# Patient Record
Sex: MALE | Race: WHITE | NOT HISPANIC OR LATINO
[De-identification: names, ages, dates, MRNs, and addresses within clinical notes are randomized per-mention and may not be internally consistent; named-entity substitution may affect disease eponyms.]

---

## 2017-06-07 ENCOUNTER — APPOINTMENT (OUTPATIENT)
Dept: UROLOGY | Facility: CLINIC | Age: 67
End: 2017-06-07
Payer: COMMERCIAL

## 2017-06-07 PROCEDURE — 99214 OFFICE O/P EST MOD 30 MIN: CPT

## 2017-06-08 LAB
APPEARANCE: CLEAR
BACTERIA: NEGATIVE
BILIRUBIN URINE: NEGATIVE
BLOOD URINE: NEGATIVE
COLOR: YELLOW
CORE LAB FLUID CYTOLOGY: NORMAL
GLUCOSE QUALITATIVE U: NORMAL MG/DL
KETONES URINE: NEGATIVE
LEUKOCYTE ESTERASE URINE: NEGATIVE
MICROSCOPIC-UA: NORMAL
NITRITE URINE: NEGATIVE
PH URINE: 6
PROTEIN URINE: NEGATIVE MG/DL
PSA FREE FLD-MCNC: 13.8
PSA FREE SERPL-MCNC: 0.04 NG/ML
PSA SERPL-MCNC: 0.29 NG/ML
RED BLOOD CELLS URINE: 1 /HPF
SPECIFIC GRAVITY URINE: 1.01
SQUAMOUS EPITHELIAL CELLS: 0 /HPF
UROBILINOGEN URINE: 1 MG/DL
WHITE BLOOD CELLS URINE: 1 /HPF

## 2017-09-30 ENCOUNTER — INPATIENT (INPATIENT)
Facility: HOSPITAL | Age: 67
LOS: 12 days | Discharge: NURSING, REHAB OR LONG-TERM CARE | DRG: 235 | End: 2017-10-13
Attending: THORACIC SURGERY (CARDIOTHORACIC VASCULAR SURGERY) | Admitting: THORACIC SURGERY (CARDIOTHORACIC VASCULAR SURGERY)
Payer: MEDICARE

## 2017-09-30 VITALS
DIASTOLIC BLOOD PRESSURE: 71 MMHG | SYSTOLIC BLOOD PRESSURE: 104 MMHG | TEMPERATURE: 98 F | OXYGEN SATURATION: 96 % | HEIGHT: 72 IN | RESPIRATION RATE: 20 BRPM | WEIGHT: 179.9 LBS | HEART RATE: 80 BPM

## 2017-09-30 LAB
ALBUMIN SERPL ELPH-MCNC: 3.6 G/DL — SIGNIFICANT CHANGE UP (ref 3.3–5)
ALP SERPL-CCNC: 158 U/L — HIGH (ref 40–120)
ALT FLD-CCNC: 85 U/L — HIGH (ref 10–45)
ANION GAP SERPL CALC-SCNC: 19 MMOL/L — HIGH (ref 5–17)
APTT BLD: 35.1 SEC — SIGNIFICANT CHANGE UP (ref 27.5–37.4)
AST SERPL-CCNC: 28 U/L — SIGNIFICANT CHANGE UP (ref 10–40)
BASOPHILS NFR BLD AUTO: 0.1 % — SIGNIFICANT CHANGE UP (ref 0–2)
BILIRUB SERPL-MCNC: 0.5 MG/DL — SIGNIFICANT CHANGE UP (ref 0.2–1.2)
BLD GP AB SCN SERPL QL: NEGATIVE — SIGNIFICANT CHANGE UP
BUN SERPL-MCNC: 121 MG/DL — HIGH (ref 7–23)
CALCIUM SERPL-MCNC: 8.6 MG/DL — SIGNIFICANT CHANGE UP (ref 8.4–10.5)
CHLORIDE SERPL-SCNC: 91 MMOL/L — LOW (ref 96–108)
CHOLEST SERPL-MCNC: 119 MG/DL — SIGNIFICANT CHANGE UP (ref 10–199)
CK MB CFR SERPL CALC: 3.3 NG/ML — SIGNIFICANT CHANGE UP (ref 0–6.7)
CK SERPL-CCNC: 88 U/L — SIGNIFICANT CHANGE UP (ref 30–200)
CO2 SERPL-SCNC: 18 MMOL/L — LOW (ref 22–31)
CREAT SERPL-MCNC: 4.28 MG/DL — HIGH (ref 0.5–1.3)
EOSINOPHIL NFR BLD AUTO: 0.5 % — SIGNIFICANT CHANGE UP (ref 0–6)
GLUCOSE SERPL-MCNC: 216 MG/DL — HIGH (ref 70–99)
HBA1C BLD-MCNC: 5.5 % — SIGNIFICANT CHANGE UP (ref 4–5.6)
HCT VFR BLD CALC: 25.2 % — LOW (ref 39–50)
HDLC SERPL-MCNC: 30 MG/DL — LOW (ref 40–125)
HGB BLD-MCNC: 8.7 G/DL — LOW (ref 13–17)
INR BLD: 1.14 — SIGNIFICANT CHANGE UP (ref 0.88–1.16)
LIPID PNL WITH DIRECT LDL SERPL: 64 MG/DL — SIGNIFICANT CHANGE UP
LYMPHOCYTES # BLD AUTO: 9.5 % — LOW (ref 13–44)
MCHC RBC-ENTMCNC: 31.4 PG — SIGNIFICANT CHANGE UP (ref 27–34)
MCHC RBC-ENTMCNC: 34.5 G/DL — SIGNIFICANT CHANGE UP (ref 32–36)
MCV RBC AUTO: 91 FL — SIGNIFICANT CHANGE UP (ref 80–100)
MONOCYTES NFR BLD AUTO: 9.7 % — SIGNIFICANT CHANGE UP (ref 2–14)
NEUTROPHILS NFR BLD AUTO: 80.2 % — HIGH (ref 43–77)
NT-PROBNP SERPL-SCNC: HIGH PG/ML (ref 0–300)
PLATELET # BLD AUTO: 365 K/UL — SIGNIFICANT CHANGE UP (ref 150–400)
POTASSIUM SERPL-MCNC: 4.7 MMOL/L — SIGNIFICANT CHANGE UP (ref 3.5–5.3)
POTASSIUM SERPL-SCNC: 4.7 MMOL/L — SIGNIFICANT CHANGE UP (ref 3.5–5.3)
PROT SERPL-MCNC: 6.9 G/DL — SIGNIFICANT CHANGE UP (ref 6–8.3)
PROTHROM AB SERPL-ACNC: 12.7 SEC — SIGNIFICANT CHANGE UP (ref 9.8–12.7)
RBC # BLD: 2.77 M/UL — LOW (ref 4.2–5.8)
RBC # FLD: 13.5 % — SIGNIFICANT CHANGE UP (ref 10.3–16.9)
RH IG SCN BLD-IMP: POSITIVE — SIGNIFICANT CHANGE UP
SODIUM SERPL-SCNC: 128 MMOL/L — LOW (ref 135–145)
TOTAL CHOLESTEROL/HDL RATIO MEASUREMENT: 4 RATIO — SIGNIFICANT CHANGE UP (ref 3.4–9.6)
TRIGL SERPL-MCNC: 125 MG/DL — SIGNIFICANT CHANGE UP (ref 10–149)
TROPONIN T SERPL-MCNC: 4.3 NG/ML — CRITICAL HIGH (ref 0–0.01)
TSH SERPL-MCNC: 0.31 UIU/ML — LOW (ref 0.35–4.94)
WBC # BLD: 11.8 K/UL — HIGH (ref 3.8–10.5)
WBC # FLD AUTO: 11.8 K/UL — HIGH (ref 3.8–10.5)

## 2017-09-30 PROCEDURE — 71010: CPT | Mod: 26

## 2017-09-30 RX ORDER — DEXTROSE 50 % IN WATER 50 %
1 SYRINGE (ML) INTRAVENOUS ONCE
Qty: 0 | Refills: 0 | Status: DISCONTINUED | OUTPATIENT
Start: 2017-09-30 | End: 2017-10-01

## 2017-09-30 RX ORDER — BENZOCAINE AND MENTHOL 5; 1 G/100ML; G/100ML
1 LIQUID ORAL ONCE
Qty: 0 | Refills: 0 | Status: COMPLETED | OUTPATIENT
Start: 2017-09-30 | End: 2017-09-30

## 2017-09-30 RX ORDER — ASPIRIN/CALCIUM CARB/MAGNESIUM 324 MG
81 TABLET ORAL DAILY
Qty: 0 | Refills: 0 | Status: DISCONTINUED | OUTPATIENT
Start: 2017-09-30 | End: 2017-10-13

## 2017-09-30 RX ORDER — IPRATROPIUM/ALBUTEROL SULFATE 18-103MCG
3 AEROSOL WITH ADAPTER (GRAM) INHALATION EVERY 6 HOURS
Qty: 0 | Refills: 0 | Status: DISCONTINUED | OUTPATIENT
Start: 2017-09-30 | End: 2017-10-04

## 2017-09-30 RX ORDER — DEXTROSE 50 % IN WATER 50 %
12.5 SYRINGE (ML) INTRAVENOUS ONCE
Qty: 0 | Refills: 0 | Status: DISCONTINUED | OUTPATIENT
Start: 2017-09-30 | End: 2017-10-01

## 2017-09-30 RX ORDER — METOPROLOL TARTRATE 50 MG
12.5 TABLET ORAL
Qty: 0 | Refills: 0 | Status: DISCONTINUED | OUTPATIENT
Start: 2017-09-30 | End: 2017-10-04

## 2017-09-30 RX ORDER — SODIUM CHLORIDE 9 MG/ML
3 INJECTION INTRAMUSCULAR; INTRAVENOUS; SUBCUTANEOUS EVERY 8 HOURS
Qty: 0 | Refills: 0 | Status: DISCONTINUED | OUTPATIENT
Start: 2017-09-30 | End: 2017-10-04

## 2017-09-30 RX ORDER — DEXTROSE 50 % IN WATER 50 %
25 SYRINGE (ML) INTRAVENOUS ONCE
Qty: 0 | Refills: 0 | Status: DISCONTINUED | OUTPATIENT
Start: 2017-09-30 | End: 2017-10-01

## 2017-09-30 RX ORDER — HEPARIN SODIUM 5000 [USP'U]/ML
5000 INJECTION INTRAVENOUS; SUBCUTANEOUS EVERY 8 HOURS
Qty: 0 | Refills: 0 | Status: DISCONTINUED | OUTPATIENT
Start: 2017-09-30 | End: 2017-10-09

## 2017-09-30 RX ORDER — INSULIN LISPRO 100/ML
VIAL (ML) SUBCUTANEOUS
Qty: 0 | Refills: 0 | Status: DISCONTINUED | OUTPATIENT
Start: 2017-09-30 | End: 2017-10-01

## 2017-09-30 RX ORDER — PANTOPRAZOLE SODIUM 20 MG/1
40 TABLET, DELAYED RELEASE ORAL
Qty: 0 | Refills: 0 | Status: DISCONTINUED | OUTPATIENT
Start: 2017-09-30 | End: 2017-10-04

## 2017-09-30 RX ORDER — INFLUENZA VIRUS VACCINE 15; 15; 15; 15 UG/.5ML; UG/.5ML; UG/.5ML; UG/.5ML
0.5 SUSPENSION INTRAMUSCULAR ONCE
Qty: 0 | Refills: 0 | Status: DISCONTINUED | OUTPATIENT
Start: 2017-09-30 | End: 2017-10-01

## 2017-09-30 RX ORDER — GLUCAGON INJECTION, SOLUTION 0.5 MG/.1ML
1 INJECTION, SOLUTION SUBCUTANEOUS ONCE
Qty: 0 | Refills: 0 | Status: DISCONTINUED | OUTPATIENT
Start: 2017-09-30 | End: 2017-10-01

## 2017-09-30 RX ORDER — ACETAMINOPHEN 500 MG
650 TABLET ORAL EVERY 6 HOURS
Qty: 0 | Refills: 0 | Status: DISCONTINUED | OUTPATIENT
Start: 2017-09-30 | End: 2017-10-04

## 2017-09-30 RX ORDER — FUROSEMIDE 40 MG
20 TABLET ORAL ONCE
Qty: 0 | Refills: 0 | Status: COMPLETED | OUTPATIENT
Start: 2017-09-30 | End: 2017-09-30

## 2017-09-30 RX ORDER — SODIUM CHLORIDE 9 MG/ML
1000 INJECTION, SOLUTION INTRAVENOUS
Qty: 0 | Refills: 0 | Status: DISCONTINUED | OUTPATIENT
Start: 2017-09-30 | End: 2017-10-01

## 2017-09-30 RX ORDER — ATORVASTATIN CALCIUM 80 MG/1
40 TABLET, FILM COATED ORAL AT BEDTIME
Qty: 0 | Refills: 0 | Status: DISCONTINUED | OUTPATIENT
Start: 2017-09-30 | End: 2017-10-04

## 2017-09-30 RX ADMIN — Medication 20 MILLIGRAM(S): at 19:34

## 2017-09-30 RX ADMIN — Medication 1: at 21:51

## 2017-09-30 RX ADMIN — HEPARIN SODIUM 5000 UNIT(S): 5000 INJECTION INTRAVENOUS; SUBCUTANEOUS at 21:49

## 2017-09-30 RX ADMIN — SODIUM CHLORIDE 3 MILLILITER(S): 9 INJECTION INTRAMUSCULAR; INTRAVENOUS; SUBCUTANEOUS at 22:19

## 2017-09-30 RX ADMIN — ATORVASTATIN CALCIUM 40 MILLIGRAM(S): 80 TABLET, FILM COATED ORAL at 21:49

## 2017-09-30 RX ADMIN — Medication 12.5 MILLIGRAM(S): at 19:33

## 2017-09-30 RX ADMIN — BENZOCAINE AND MENTHOL 1 LOZENGE: 5; 1 LIQUID ORAL at 21:49

## 2017-09-30 NOTE — H&P ADULT - ASSESSMENT
66 year old male with history of DM, HLD and prostatic cancer s/p radiation therapy in 2010 was brought in by ambulance after a syncopal episode found to have 3vCAD, systolic HF with EF 25% and moderate MR. Patient transferred to [facility] for surgical evaluation and possible CABG.   - Pre-operative work up ordered  - follow up CBC, CMP, lipid panel, HA1c, TSH, Cardiac Enzymes and BNP   - follow up repeat echo, Chest Xray, carotids, PFTs   - Will start patient ASA, Metoprolol 12.5 mg PO every 12 hours, atorvastatin 40mg for CAD   - PPX: Protonix and Subq Heparin  - Sorenson to review cath report.     XUAN Villa 66 year old male with history of DM, HLD and prostatic cancer s/p radiation therapy in 2010 was brought in by ambulance after a syncopal episode found to have 3vCAD, systolic HF with EF 25% and moderate MR. Patient transferred to [facility] for surgical evaluation and possible CABG.   - Pre-operative work up ordered  - follow up CBC, CMP, lipid panel, HA1c, TSH, Cardiac Enzymes and BNP   - follow up repeat echo, Chest Xray, carotids, PFTs   - Will start patient ASA, Metoprolol 12.5 mg PO every 12 hours, atorvastatin 40mg for CAD   - PPX: Protonix and Subq Heparin  - Sorenson to review cath report.   - US lower extremities for claudications     XUAN Villa

## 2017-09-30 NOTE — H&P ADULT - NSHPPHYSICALEXAM_GEN_ALL_CORE
Physical Exam  CONSTITUTIONAL:  AOx3 in no acute distress.   NEURO:  No focal neuro deficit. no gait or speech abnormality.                       EYES: EOMS intact PERRLA   ENMT:  moist mucus membranes   CV: RRR no murmurs rubs or gallops   RESPIRATORY: + expiratory wheeze +bibasilar rales no rhonchi   GI: soft non tender non distended. normal bowel sounds   : RIVERA -                                                                    MUSKULOSKELETAL: WWP 2+ distal pulses bilaterally no varcosits   SKIN / BREAST:  good skin turgor

## 2017-09-30 NOTE — H&P ADULT - NSHPSOCIALHISTORY_GEN_ALL_CORE
Never smoker   Occasional Drinker   Lives with wife   Does not use assisted devices   Lives in elevator building

## 2017-09-30 NOTE — H&P ADULT - NSHPREVIEWOFSYSTEMS_GEN_ALL_CORE
CONSTITUTIONAL: No fevers / chills, sweats, fatigue, weight loss, weight gain  NEUROLOGICAL: No parathesias, seizures, syncope, confusion  EYES: No blurry vision, discharge, pain, loss of vision  ENMT: No difficulty hearing, vertigo, dysphagia, epistaxis, recent dental work  CV: No chest pain, palpitations, HERNANDEZ, orthopnea  RESPIRATORY:  No wheezing, SOB, cough / sputum, hemoptysis  GI: No nausea, vomiting, diarrhea, constipation, melena  : No hematuria, dysuria, urgency, incontinence  MUSKULOSKELETAL: No arthritis, joint swelling, muscle weakness  SKIN/BREAST: No rash, itching, hair loss, masses  PSYCHIATRY: No depression, anxiety, suicidal ideation  HEMEATOLOGY:  No bruising easily, enlarged lymph nodes, tender lymph nodes  ENDOCRINE: No cold intolerance, heat intolerance, polydipsia

## 2017-09-30 NOTE — H&P ADULT - HISTORY OF PRESENT ILLNESS
66 year old male w 66 year old male with history of DM, HLD and prostatic cancer s/p radiation therapy in 2010 was brought in by ambulance after a syncopal episode. Patient was having multiple days of diarrhea after taking laxative for constipation. In the ED at [place] patient's labs were significant for elevated troponins without EKG changes, elevated Creatinine and Hyponatremia. The YOANDY and hyponatremia was presumed to be due to gastric losses and both recovered with fluid resuscitation. On 9/29/17 patient underwent cardiac catherization for NSTEMI and was found to have EF of 25% and 3vCAD ( MidLAD 80%, distal LAD 80%, Diagonal 85%, Prox circ 95%, . left PDA 65%, Prox RCA 70, Mid RCA 85% and Distal PCA 90%). Echo demonstrated moderate Tricuspid regurgitation. In light of 3vCAD patient was transferred to [facility] for CABG work up. Presently patient is without fever, chills, chest pain, SOB, n/v/d abdominal pain, dizziness, lightheadedness, palpitations.

## 2017-10-01 DIAGNOSIS — I25.10 ATHEROSCLEROTIC HEART DISEASE OF NATIVE CORONARY ARTERY WITHOUT ANGINA PECTORIS: ICD-10-CM

## 2017-10-01 DIAGNOSIS — E87.1 HYPO-OSMOLALITY AND HYPONATREMIA: ICD-10-CM

## 2017-10-01 DIAGNOSIS — N17.9 ACUTE KIDNEY FAILURE, UNSPECIFIED: ICD-10-CM

## 2017-10-01 LAB
ALBUMIN SERPL ELPH-MCNC: 3.5 G/DL — SIGNIFICANT CHANGE UP (ref 3.3–5)
ALP SERPL-CCNC: 143 U/L — HIGH (ref 40–120)
ALT FLD-CCNC: 71 U/L — HIGH (ref 10–45)
ANION GAP SERPL CALC-SCNC: 20 MMOL/L — HIGH (ref 5–17)
APPEARANCE UR: CLEAR — SIGNIFICANT CHANGE UP
APTT BLD: 35.6 SEC — SIGNIFICANT CHANGE UP (ref 27.5–37.4)
AST SERPL-CCNC: 25 U/L — SIGNIFICANT CHANGE UP (ref 10–40)
BASOPHILS NFR BLD AUTO: 0.1 % — SIGNIFICANT CHANGE UP (ref 0–2)
BILIRUB SERPL-MCNC: 0.5 MG/DL — SIGNIFICANT CHANGE UP (ref 0.2–1.2)
BILIRUB UR-MCNC: NEGATIVE — SIGNIFICANT CHANGE UP
BUN SERPL-MCNC: 130 MG/DL — HIGH (ref 7–23)
CALCIUM SERPL-MCNC: 8.9 MG/DL — SIGNIFICANT CHANGE UP (ref 8.4–10.5)
CHLORIDE SERPL-SCNC: 92 MMOL/L — LOW (ref 96–108)
CHLORIDE UR-SCNC: 35 MMOL/L — SIGNIFICANT CHANGE UP
CO2 SERPL-SCNC: 15 MMOL/L — LOW (ref 22–31)
COLOR SPEC: YELLOW — SIGNIFICANT CHANGE UP
CREAT ?TM UR-MCNC: 101 MG/DL — SIGNIFICANT CHANGE UP
CREAT SERPL-MCNC: 5.17 MG/DL — HIGH (ref 0.5–1.3)
DIFF PNL FLD: (no result)
EOSINOPHIL NFR BLD AUTO: 1.2 % — SIGNIFICANT CHANGE UP (ref 0–6)
GLUCOSE SERPL-MCNC: 169 MG/DL — HIGH (ref 70–99)
GLUCOSE UR QL: NEGATIVE — SIGNIFICANT CHANGE UP
HCT VFR BLD CALC: 25.1 % — LOW (ref 39–50)
HCT VFR BLD CALC: 25.2 % — LOW (ref 39–50)
HGB BLD-MCNC: 8.7 G/DL — LOW (ref 13–17)
HGB BLD-MCNC: 8.8 G/DL — LOW (ref 13–17)
KETONES UR-MCNC: NEGATIVE — SIGNIFICANT CHANGE UP
LEUKOCYTE ESTERASE UR-ACNC: NEGATIVE — SIGNIFICANT CHANGE UP
LYMPHOCYTES # BLD AUTO: 10.6 % — LOW (ref 13–44)
MCHC RBC-ENTMCNC: 31.9 PG — SIGNIFICANT CHANGE UP (ref 27–34)
MCHC RBC-ENTMCNC: 32.1 PG — SIGNIFICANT CHANGE UP (ref 27–34)
MCHC RBC-ENTMCNC: 34.7 G/DL — SIGNIFICANT CHANGE UP (ref 32–36)
MCHC RBC-ENTMCNC: 34.9 G/DL — SIGNIFICANT CHANGE UP (ref 32–36)
MCV RBC AUTO: 91.3 FL — SIGNIFICANT CHANGE UP (ref 80–100)
MCV RBC AUTO: 92.6 FL — SIGNIFICANT CHANGE UP (ref 80–100)
MONOCYTES NFR BLD AUTO: 10 % — SIGNIFICANT CHANGE UP (ref 2–14)
NEUTROPHILS NFR BLD AUTO: 78.1 % — HIGH (ref 43–77)
NITRITE UR-MCNC: NEGATIVE — SIGNIFICANT CHANGE UP
OSMOLALITY SERPL: 321 MOSM/KG — HIGH (ref 280–301)
OSMOLALITY UR: 420 MOSMOL/KG — SIGNIFICANT CHANGE UP (ref 100–650)
PH UR: 5 — SIGNIFICANT CHANGE UP (ref 5–8)
PHOSPHATE SERPL-MCNC: 7.8 MG/DL — HIGH (ref 2.5–4.5)
PLATELET # BLD AUTO: 335 K/UL — SIGNIFICANT CHANGE UP (ref 150–400)
PLATELET # BLD AUTO: 375 K/UL — SIGNIFICANT CHANGE UP (ref 150–400)
POTASSIUM SERPL-MCNC: 4.9 MMOL/L — SIGNIFICANT CHANGE UP (ref 3.5–5.3)
POTASSIUM SERPL-SCNC: 4.9 MMOL/L — SIGNIFICANT CHANGE UP (ref 3.5–5.3)
POTASSIUM UR-SCNC: 31 MMOL/L — SIGNIFICANT CHANGE UP
PROT ?TM UR-MCNC: 6 MG/DL — SIGNIFICANT CHANGE UP (ref 0–12)
PROT SERPL-MCNC: 6.7 G/DL — SIGNIFICANT CHANGE UP (ref 6–8.3)
PROT UR-MCNC: NEGATIVE MG/DL — SIGNIFICANT CHANGE UP
PROT/CREAT UR-RTO: 0.1 RATIO — SIGNIFICANT CHANGE UP (ref 0–0.2)
RBC # BLD: 2.71 M/UL — LOW (ref 4.2–5.8)
RBC # BLD: 2.76 M/UL — LOW (ref 4.2–5.8)
RBC # FLD: 13.8 % — SIGNIFICANT CHANGE UP (ref 10.3–16.9)
RBC # FLD: 13.9 % — SIGNIFICANT CHANGE UP (ref 10.3–16.9)
SODIUM SERPL-SCNC: 127 MMOL/L — LOW (ref 135–145)
SODIUM UR-SCNC: 36 MMOL/L — SIGNIFICANT CHANGE UP
SP GR SPEC: 1.02 — SIGNIFICANT CHANGE UP (ref 1–1.03)
T4 AB SER-ACNC: 3.47 UG/DL — SIGNIFICANT CHANGE UP (ref 3.17–11.72)
T4 AB SER-ACNC: 4.06 UG/DL — SIGNIFICANT CHANGE UP (ref 3.17–11.72)
TROPONIN T SERPL-MCNC: 4.95 NG/ML — CRITICAL HIGH (ref 0–0.01)
TROPONIN T SERPL-MCNC: 5.16 NG/ML — CRITICAL HIGH (ref 0–0.01)
UROBILINOGEN FLD QL: 0.2 E.U./DL — SIGNIFICANT CHANGE UP
UUN UR-MCNC: 598 MG/DL — SIGNIFICANT CHANGE UP
WBC # BLD: 12.8 K/UL — HIGH (ref 3.8–10.5)
WBC # BLD: 14.2 K/UL — HIGH (ref 3.8–10.5)
WBC # FLD AUTO: 12.8 K/UL — HIGH (ref 3.8–10.5)
WBC # FLD AUTO: 14.2 K/UL — HIGH (ref 3.8–10.5)

## 2017-10-01 PROCEDURE — 93880 EXTRACRANIAL BILAT STUDY: CPT | Mod: 26

## 2017-10-01 PROCEDURE — 71010: CPT | Mod: 26

## 2017-10-01 PROCEDURE — 99291 CRITICAL CARE FIRST HOUR: CPT

## 2017-10-01 PROCEDURE — 99292 CRITICAL CARE ADDL 30 MIN: CPT

## 2017-10-01 PROCEDURE — 99223 1ST HOSP IP/OBS HIGH 75: CPT | Mod: 25

## 2017-10-01 PROCEDURE — 93925 LOWER EXTREMITY STUDY: CPT | Mod: 26

## 2017-10-01 PROCEDURE — 71010: CPT | Mod: 26,77

## 2017-10-01 PROCEDURE — 36556 INSERT NON-TUNNEL CV CATH: CPT

## 2017-10-01 RX ORDER — DEXTROSE 50 % IN WATER 50 %
12.5 SYRINGE (ML) INTRAVENOUS ONCE
Qty: 0 | Refills: 0 | Status: DISCONTINUED | OUTPATIENT
Start: 2017-10-01 | End: 2017-10-04

## 2017-10-01 RX ORDER — DEXTROSE 50 % IN WATER 50 %
25 SYRINGE (ML) INTRAVENOUS ONCE
Qty: 0 | Refills: 0 | Status: DISCONTINUED | OUTPATIENT
Start: 2017-10-01 | End: 2017-10-04

## 2017-10-01 RX ORDER — GLUCAGON INJECTION, SOLUTION 0.5 MG/.1ML
1 INJECTION, SOLUTION SUBCUTANEOUS ONCE
Qty: 0 | Refills: 0 | Status: DISCONTINUED | OUTPATIENT
Start: 2017-10-01 | End: 2017-10-04

## 2017-10-01 RX ORDER — DEXTROSE 50 % IN WATER 50 %
1 SYRINGE (ML) INTRAVENOUS ONCE
Qty: 0 | Refills: 0 | Status: DISCONTINUED | OUTPATIENT
Start: 2017-10-01 | End: 2017-10-04

## 2017-10-01 RX ORDER — SODIUM CHLORIDE 9 MG/ML
1000 INJECTION, SOLUTION INTRAVENOUS
Qty: 0 | Refills: 0 | Status: DISCONTINUED | OUTPATIENT
Start: 2017-10-01 | End: 2017-10-04

## 2017-10-01 RX ORDER — FUROSEMIDE 40 MG
80 TABLET ORAL EVERY 12 HOURS
Qty: 0 | Refills: 0 | Status: DISCONTINUED | OUTPATIENT
Start: 2017-10-01 | End: 2017-10-02

## 2017-10-01 RX ORDER — ALBUMIN HUMAN 25 %
50 VIAL (ML) INTRAVENOUS
Qty: 0 | Refills: 0 | Status: COMPLETED | OUTPATIENT
Start: 2017-10-01 | End: 2017-10-02

## 2017-10-01 RX ORDER — INSULIN LISPRO 100/ML
VIAL (ML) SUBCUTANEOUS
Qty: 0 | Refills: 0 | Status: DISCONTINUED | OUTPATIENT
Start: 2017-10-01 | End: 2017-10-04

## 2017-10-01 RX ORDER — FUROSEMIDE 40 MG
100 TABLET ORAL ONCE
Qty: 0 | Refills: 0 | Status: COMPLETED | OUTPATIENT
Start: 2017-10-01 | End: 2017-10-01

## 2017-10-01 RX ADMIN — ATORVASTATIN CALCIUM 40 MILLIGRAM(S): 80 TABLET, FILM COATED ORAL at 22:34

## 2017-10-01 RX ADMIN — HEPARIN SODIUM 5000 UNIT(S): 5000 INJECTION INTRAVENOUS; SUBCUTANEOUS at 05:18

## 2017-10-01 RX ADMIN — Medication 81 MILLIGRAM(S): at 15:02

## 2017-10-01 RX ADMIN — Medication 2: at 22:34

## 2017-10-01 RX ADMIN — Medication 2: at 07:37

## 2017-10-01 RX ADMIN — SODIUM CHLORIDE 3 MILLILITER(S): 9 INJECTION INTRAMUSCULAR; INTRAVENOUS; SUBCUTANEOUS at 21:35

## 2017-10-01 RX ADMIN — Medication 650 MILLIGRAM(S): at 05:38

## 2017-10-01 RX ADMIN — Medication 3 MILLILITER(S): at 18:36

## 2017-10-01 RX ADMIN — PANTOPRAZOLE SODIUM 40 MILLIGRAM(S): 20 TABLET, DELAYED RELEASE ORAL at 07:37

## 2017-10-01 RX ADMIN — Medication 12.5 MILLIGRAM(S): at 05:18

## 2017-10-01 RX ADMIN — Medication 3 MILLILITER(S): at 15:01

## 2017-10-01 RX ADMIN — Medication 2: at 12:16

## 2017-10-01 RX ADMIN — Medication 3 MILLILITER(S): at 05:18

## 2017-10-01 RX ADMIN — Medication 80 MILLIGRAM(S): at 16:38

## 2017-10-01 RX ADMIN — SODIUM CHLORIDE 3 MILLILITER(S): 9 INJECTION INTRAMUSCULAR; INTRAVENOUS; SUBCUTANEOUS at 14:54

## 2017-10-01 RX ADMIN — Medication 12.5 MILLIGRAM(S): at 18:35

## 2017-10-01 RX ADMIN — HEPARIN SODIUM 5000 UNIT(S): 5000 INJECTION INTRAVENOUS; SUBCUTANEOUS at 22:34

## 2017-10-01 RX ADMIN — Medication 100 MILLIGRAM(S): at 19:18

## 2017-10-01 RX ADMIN — Medication 4: at 17:02

## 2017-10-01 RX ADMIN — SODIUM CHLORIDE 3 MILLILITER(S): 9 INJECTION INTRAMUSCULAR; INTRAVENOUS; SUBCUTANEOUS at 05:18

## 2017-10-01 RX ADMIN — HEPARIN SODIUM 5000 UNIT(S): 5000 INJECTION INTRAVENOUS; SUBCUTANEOUS at 15:02

## 2017-10-01 RX ADMIN — Medication 650 MILLIGRAM(S): at 05:18

## 2017-10-01 NOTE — PROGRESS NOTE ADULT - SUBJECTIVE AND OBJECTIVE BOX
Patient discussed on morning rounds with Dr. Sorenson     Operation / Date: pre-op CABG    SUBJECTIVE ASSESSMENT:  66y Male reports feeling very tired today. Denies chest pain or SOB. States that he never had chest pain. Denies fever or chills, dizziness, syncope, abdominal pain. States that he does not have orthopnea or PND. He has not ambulated since he has been here. Patient complains that the miranda catheter is bothering him but offers no other complaints at this time.         Vital Signs Last 24 Hrs  T(C): 35.9 (01 Oct 2017 14:20), Max: 36.7 (30 Sep 2017 22:21)  T(F): 96.6 (01 Oct 2017 14:20), Max: 98 (30 Sep 2017 22:21)  HR: 76 (01 Oct 2017 14:42) (70 - 82)  BP: 108/68 (01 Oct 2017 14:42) (104/69 - 108/68)  BP(mean): 80 (01 Oct 2017 14:42) (80 - 100)  RR: 18 (01 Oct 2017 14:42) (18 - 22)  SpO2: 96% (01 Oct 2017 14:42) (93% - 97%)  I&O's Detail    30 Sep 2017 07:  -  01 Oct 2017 07:00  --------------------------------------------------------  IN:    Oral Fluid: 50 mL  Total IN: 50 mL    OUT:    Stool: 1 mL    Voided: 200 mL  Total OUT: 201 mL    Total NET: -151 mL      01 Oct 2017 07:  -  01 Oct 2017 15:21  --------------------------------------------------------  IN:    Oral Fluid: 120 mL  Total IN: 120 mL    OUT:    Indwelling Catheter - Urethral: 575 mL  Total OUT: 575 mL    Total NET: -455 mL          CHEST TUBE:  No. .   KIRILL DRAIN: No.  EPICARDIAL WIRES:No.  TIE DOWNS:No.  MIRANDA: Yes    PHYSICAL EXAM:    General: AOx3 in no acute distress. Lethargic but answers questions appropriately.     Neurological: slowed mentation but cooperative answers questions appropriately. Sensation and mobility intact at all limbs. No speech abnormality of facial droop/     Cardiovascular: RRR no murmurs rubs or gallops.     Respiratory: bibasilar crackles with + inspiratory wheeze. No rhonchi    Gastrointestinal: soft non tender non distended. active bowel sounds    Extremities: WWP trace lower extremity pitting edema bilaterally     Vascular: 2+ DP and radial pulses bilaterally     Incision Sites: n/a     LABS:                        8.8    14.2  )-----------( 375      ( 01 Oct 2017 06:06 )             25.2         PT/INR - ( 30 Sep 2017 18:00 )   PT: 12.7 sec;   INR: 1.14          PTT - ( 30 Sep 2017 18:00 )  PTT:35.1 sec    10-01    127<L>  |  92<L>  |  130<H>  ----------------------------<  169<H>  4.9   |  15<L>  |  5.17<H>    Ca    8.9      01 Oct 2017 06:07  Phos  7.8     10-    TPro  6.7  /  Alb  3.5  /  TBili  0.5  /  DBili  x   /  AST  25  /  ALT  71<H>  /  AlkPhos  143<H>  10      Urinalysis Basic - ( 01 Oct 2017 10:38 )    Color: Yellow / Appearance: Clear / S.020 / pH: x  Gluc: x / Ketone: NEGATIVE  / Bili: NEGATIVE / Urobili: 0.2 E.U./dL   Blood: x / Protein: NEGATIVE mg/dL / Nitrite: NEGATIVE   Leuk Esterase: NEGATIVE / RBC: 5-10 /HPF / WBC > 10 /HPF   Sq Epi: x / Non Sq Epi: Few /HPF / Bacteria: Present /HPF        MEDICATIONS  (STANDING):  sodium chloride 0.9% lock flush 3 milliLiter(s) IV Push every 8 hours  aspirin enteric coated 81 milliGRAM(s) Oral daily  metoprolol 12.5 milliGRAM(s) Oral two times a day  atorvastatin 40 milliGRAM(s) Oral at bedtime  pantoprazole    Tablet 40 milliGRAM(s) Oral before breakfast  heparin  Injectable 5000 Unit(s) SubCutaneous every 8 hours  influenza   Vaccine 0.5 milliLiter(s) IntraMuscular once  ALBUTerol/ipratropium for Nebulization 3 milliLiter(s) Nebulizer every 6 hours  insulin lispro (HumaLOG) corrective regimen sliding scale   SubCutaneous Before meals and at bedtime  dextrose 5%. 1000 milliLiter(s) (50 mL/Hr) IV Continuous <Continuous>  dextrose 50% Injectable 12.5 Gram(s) IV Push once  dextrose 50% Injectable 25 Gram(s) IV Push once  dextrose 50% Injectable 25 Gram(s) IV Push once  furosemide   Injectable 80 milliGRAM(s) IV Push every 12 hours    MEDICATIONS  (PRN):  acetaminophen   Tablet. 650 milliGRAM(s) Oral every 6 hours PRN Mild Pain (1 - 3)  dextrose Gel 1 Dose(s) Oral once PRN Blood Glucose LESS THAN 70 milliGRAM(s)/deciliter  glucagon  Injectable 1 milliGRAM(s) IntraMuscular once PRN Glucose LESS THAN 70 milligrams/deciliter        RADIOLOGY & ADDITIONAL TESTS:

## 2017-10-01 NOTE — PROGRESS NOTE ADULT - SUBJECTIVE AND OBJECTIVE BOX
Addendum  Patient was noted to be anuric without any response to diuretics. Patient was noted to have worsening in volume overload. Decision was made to start patient on hemodialysis. Planned discussed with CTICU team and Dr. Campos.

## 2017-10-01 NOTE — CONSULT NOTE ADULT - ATTENDING COMMENTS
Patient examined, fellow's hx and PE reviewed and confirmed. I find YOANDY, fluid overload. A/P reviewed and confirmed. Plan on dialysis. No urgent indications. See full note.

## 2017-10-01 NOTE — CONSULT NOTE ADULT - PROBLEM SELECTOR RECOMMENDATION 2
Patient has hypervolemic hyponatremia.     P - please check TSH, Uric acid, urine osmolality, urine sodium, and serum osmolality   Fluid restrict patient to 1L per day   Continue furosemide 80 mg IV q12h.   Monitor strict I/Os   please obtain collateral information about patient's sodium at [facility]   Goal sodium would be 134 over the course of 24 hours  patient was noted to have water jugs by his bedside, please reiterate to patient that he should not drink more then 1L of fluid in the day

## 2017-10-01 NOTE — CONSULT NOTE ADULT - ASSESSMENT
Patient is a 66 year old male with acute renal failure and hyponatremia, diabetes mellitus and hyperlipidemia admitted for evaluation for triple vessel disease.

## 2017-10-01 NOTE — CONSULT NOTE ADULT - SUBJECTIVE AND OBJECTIVE BOX
Patient is a 66y Male for whom nephrology was consulted for acute renal failure and hyponatremia. Patient has a history of diabetes mellitus, hyperlipidemia, and prostate cancer s/p radiation therapy in  whom initially presented to [facility]. Patient was noted to have an elevated creatinine and hyponatremia on lab results from [facility]. Patient underwent cardiac catheterization for NSTEMI at [facility], and was found to have triple vessel disease. Patient presented to [facility] for management of triple vessel disease.    PAST MEDICAL & SURGICAL HISTORY:  Prostatic cancer  Diabetes mellitus  HTN (hypertension)  No significant past surgical history    MEDICATIONS  (STANDING):  sodium chloride 0.9% lock flush 3 milliLiter(s) IV Push every 8 hours  aspirin enteric coated 81 milliGRAM(s) Oral daily  metoprolol 12.5 milliGRAM(s) Oral two times a day  atorvastatin 40 milliGRAM(s) Oral at bedtime  pantoprazole    Tablet 40 milliGRAM(s) Oral before breakfast  heparin  Injectable 5000 Unit(s) SubCutaneous every 8 hours  influenza   Vaccine 0.5 milliLiter(s) IntraMuscular once  ALBUTerol/ipratropium for Nebulization 3 milliLiter(s) Nebulizer every 6 hours  insulin lispro (HumaLOG) corrective regimen sliding scale   SubCutaneous Before meals and at bedtime  dextrose 5%. 1000 milliLiter(s) (50 mL/Hr) IV Continuous <Continuous>  dextrose 50% Injectable 12.5 Gram(s) IV Push once  dextrose 50% Injectable 25 Gram(s) IV Push once  dextrose 50% Injectable 25 Gram(s) IV Push once  furosemide   Injectable 80 milliGRAM(s) IV Push every 12 hours    MEDICATIONS  (PRN):  acetaminophen   Tablet. 650 milliGRAM(s) Oral every 6 hours PRN Mild Pain (1 - 3)  dextrose Gel 1 Dose(s) Oral once PRN Blood Glucose LESS THAN 70 milliGRAM(s)/deciliter  glucagon  Injectable 1 milliGRAM(s) IntraMuscular once PRN Glucose LESS THAN 70 milligrams/deciliter    Allergies    No Known Allergies    Intolerances    SOCIAL HISTORY: occasional alcohol use, denies smoking, denies illicit drug use     FAMILY HISTORY:  No pertinent family history in first degree relatives    T(C): , Max: 36.7 (17 @ 22:21)  T(F): , Max: 98 (17 @ 22:21)  HR: 70 (10-01-17 @ 09:31)  BP: 104/69 (10-01-17 @ 09:31)  BP(mean): 82 (10-01-17 @ 09:31)  RR: 18 (10-01-17 @ 09:)  SpO2: 93% (10-01-17 @ 09:31)  Wt(kg): --     @ 07:01  -  10-01 @ 07:00  --------------------------------------------------------  IN: 50 mL / OUT: 201 mL / NET: -151 mL    10-01 @ :01  -  10-01 @ 12:17  --------------------------------------------------------  IN: 120 mL / OUT: 575 mL / NET: -455 mL      Height (cm): 182.88 ( @ 15:50)  Weight (kg): 81.6 ( @ 15:50)  BMI (kg/m2): 24.4 ( @ 15:50)  BSA (m2): 2.04 ( @ 15:50)    LABS:                        8.8    14.2  )-----------( 375      ( 01 Oct 2017 06:06 )             25.2     10    127<L>  |  92<L>  |  130<H>  ----------------------------<  169<H>  4.9   |  15<L>  |  5.17<H>    Ca    8.9      01 Oct 2017 06:07  Phos  7.8     10    TPro  6.7  /  Alb  3.5  /  TBili  0.5  /  DBili  x   /  AST  25  /  ALT  71<H>  /  AlkPhos  143<H>  10    Hemoglobin A1C, Whole Blood: 5.5 % [4.0 - 5.6] ( @ 18:00)  Cholesterol, Serum: 119 mg/dL [10 - 199] ( @ 18:00)    PT/INR - ( 30 Sep 2017 18:00 )   PT: 12.7 sec;   INR: 1.14       PTT - ( 30 Sep 2017 18:00 )  PTT:35.1 sec  Urinalysis Basic - ( 01 Oct 2017 10:38 )    Color: Yellow / Appearance: Clear / S.020 / pH: x  Gluc: x / Ketone: NEGATIVE  / Bili: NEGATIVE / Urobili: 0.2 E.U./dL   Blood: x / Protein: NEGATIVE mg/dL / Nitrite: NEGATIVE   Leuk Esterase: NEGATIVE / RBC: 5-10 /HPF / WBC > 10 /HPF   Sq Epi: x / Non Sq Epi: Few /HPF / Bacteria: Present /HPF      Chloride, Random Urine: 35 mmol/L (10-01 @ 10:39)  Sodium, Random Urine: 36 mmol/L (10-01 @ 10:39)  Potassium, Random Urine: 31 mmol/L (10-01 @ 10:39)  Creatinine, Random Urine: 101 mg/dL (10-01 @ 10:39)  Protein/Creatinine Ratio Calculation: 0.1 Ratio (10-01 @ 10:39)  Osmolality, Random Urine: 420 mosmol/kg (10-01 @ 10:39)

## 2017-10-01 NOTE — PROGRESS NOTE ADULT - SUBJECTIVE AND OBJECTIVE BOX
CTICU  CRITICAL  CARE  attending     Hand off received 					   Pertinent clinical, laboratory, radiographic, hemodynamic, echocardiographic, respiratory data, microbiologic data and chart were reviewed and analyzed frequently throughout the course of the day and night  Patient seen and examined with CTS/ SH attending at bedside    Pt is a 66y , Male, with CAD; recent NSTEMI;  admitted preop for CABG;    today:    developed acute anuric Renal failure  Pulm edema  transferred to ICU for HD catheter placement and emergent hemodialysis    Coronary artery disease: Coronary artery disease  Hyponatremia: Hyponatremia  Acute renal failure: Acute renal failure      , FAMILY HISTORY:  No pertinent family history in first degree relatives  PAST MEDICAL & SURGICAL HISTORY:  Prostatic cancer  Diabetes mellitus  HTN (hypertension)  No significant past surgical history    Patient is a 66y old  Male who presents with a chief complaint of CAD (30 Sep 2017 17:19)      14 system review was unremarkable  acute changes include acute respiratory failure  Vital signs, hemodynamic and respiratory parameters were reviewed from the bedside nursing flowsheet.  ICU Vital Signs Last 24 Hrs  T(C): 36.2 (01 Oct 2017 20:47), Max: 36.5 (01 Oct 2017 18:57)  T(F): 97.1 (01 Oct 2017 20:47), Max: 97.7 (01 Oct 2017 18:57)  HR: 74 (01 Oct 2017 22:00) (70 - 80)  BP: 110/78 (01 Oct 2017 22:00) (99/70 - 110/78)  BP(mean): 88 (01 Oct 2017 22:00) (75 - 100)  ABP: --  ABP(mean): --  RR: 18 (01 Oct 2017 22:00) (18 - 22)  SpO2: 98% (01 Oct 2017 22:00) (93% - 98%)    Adult Advanced Hemodynamics Last 24 Hrs  CVP(mm Hg): --  CVP(cm H2O): --  CO: --  CI: --  PA: --  PA(mean): --  PCWP: --  SVR: --  SVRI: --  PVR: --  PVRI: --,     Intake and output was reviewed and the fluid balance was calculated  Daily     Daily Weight in k.4 (01 Oct 2017 05:05)  I&O's Summary    30 Sep 2017 07:01  -  01 Oct 2017 07:00  --------------------------------------------------------  IN: 50 mL / OUT: 201 mL / NET: -151 mL    01 Oct 2017 07:01  -  01 Oct 2017 22:41  --------------------------------------------------------  IN: 600 mL / OUT: 635 mL / NET: -35 mL        All lines and drain sites were assessed  Glycemic trend was reviewedCAPILLARY BLOOD GLUCOSE  232 (01 Oct 2017 17:20)        No acute change in mental status  Auscultation of the chest reveals equal bs  Abdomen is soft  Extremities are warm and well perfused  Wounds appear clean and unremarkable  Antibiotics are periop    labs  CBC Full  -  ( 01 Oct 2017 20:46 )  WBC Count : 12.8 K/uL  Hemoglobin : 8.7 g/dL  Hematocrit : 25.1 %  Platelet Count - Automated : 335 K/uL  Mean Cell Volume : 92.6 fL  Mean Cell Hemoglobin : 32.1 pg  Mean Cell Hemoglobin Concentration : 34.7 g/dL  Auto Neutrophil # : x  Auto Lymphocyte # : x  Auto Monocyte # : x  Auto Eosinophil # : x  Auto Basophil # : x  Auto Neutrophil % : 78.1 %  Auto Lymphocyte % : 10.6 %  Auto Monocyte % : 10.0 %  Auto Eosinophil % : 1.2 %  Auto Basophil % : 0.1 %    10    127<L>  |  92<L>  |  130<H>  ----------------------------<  169<H>  4.9   |  15<L>  |  5.17<H>    Ca    8.9      01 Oct 2017 06:07  Phos  7.8     10-    TPro  6.7  /  Alb  3.5  /  TBili  0.5  /  DBili  x   /  AST  25  /  ALT  71<H>  /  AlkPhos  143<H>  10    PT/INR - ( 30 Sep 2017 18:00 )   PT: 12.7 sec;   INR: 1.14          PTT - ( 01 Oct 2017 20:46 )  PTT:35.6 sec  The current medications were reviewed   MEDICATIONS  (STANDING):  sodium chloride 0.9% lock flush 3 milliLiter(s) IV Push every 8 hours  aspirin enteric coated 81 milliGRAM(s) Oral daily  metoprolol 12.5 milliGRAM(s) Oral two times a day  atorvastatin 40 milliGRAM(s) Oral at bedtime  pantoprazole    Tablet 40 milliGRAM(s) Oral before breakfast  heparin  Injectable 5000 Unit(s) SubCutaneous every 8 hours  ALBUTerol/ipratropium for Nebulization 3 milliLiter(s) Nebulizer every 6 hours  insulin lispro (HumaLOG) corrective regimen sliding scale   SubCutaneous Before meals and at bedtime  dextrose 5%. 1000 milliLiter(s) (50 mL/Hr) IV Continuous <Continuous>  dextrose 50% Injectable 12.5 Gram(s) IV Push once  dextrose 50% Injectable 25 Gram(s) IV Push once  dextrose 50% Injectable 25 Gram(s) IV Push once  furosemide   Injectable 80 milliGRAM(s) IV Push every 12 hours  albumin human 25% IVPB 50 milliLiter(s) IV Intermittent every 1 hour    MEDICATIONS  (PRN):  acetaminophen   Tablet. 650 milliGRAM(s) Oral every 6 hours PRN Mild Pain (1 - 3)  dextrose Gel 1 Dose(s) Oral once PRN Blood Glucose LESS THAN 70 milliGRAM(s)/deciliter  glucagon  Injectable 1 milliGRAM(s) IntraMuscular once PRN Glucose LESS THAN 70 milligrams/deciliter       PROBLEM LIST/ ASSESSMENT:  HEALTH ISSUES - PROBLEM Dx:    Emergent hemodialysis/anuria/acute renal failure  Anemia  pulmonary edema (on CXR)  Coronary artery disease: Coronary artery disease  Hyponatremia: Hyponatremia  Acute renal failure: Acute renal failure  NSTEMI      ,   Patient is a 66y old  Male who presents with a chief complaint of CAD (30 Sep 2017 17:19)     s/p   acute changes include acute respiratory failure    My plan includes :  close hemodynamic, ventilatory and drain monitoring and management per post op routine  Emergent hemodialysis tonight;  Transfuse if needed  Monitor for arrhythmias and monitor parameters for organ perfusion  monitor neurologic status  Head of the bed should remain elevated to 45 deg .   chest PT and IS will be encouraged  monitor adequacy of oxygenation and ventilation and attempt to wean oxygen  Nutritional goals will be met using po eventually , ensure adequate caloric intake and montior the same  Stress ulcer and VTE prophylaxis will be achieved    Glycemic control is satisfactory  Electrolytes have been repleted as necessary and wound care has been carried out. Pain control has been achieved.   agressive physical therapy and early mobility and ambulation goals will be met   The family was updated about the course and plan  CRITICAL CARE TIME SPENT in evaluation and management, reassessments, review and interpretation of labs and x-rays, ventilator and hemodynamic management, formulating a plan and coordinating care: ___90____ MIN.  Time does not include procedural time.  CTICU ATTENDING     					    Eric Tripathi MD

## 2017-10-01 NOTE — CONSULT NOTE ADULT - PROBLEM SELECTOR RECOMMENDATION 3
Patient has triple vessel disease. Patient is planned for possible CABG. Management as per primary team.

## 2017-10-01 NOTE — CONSULT NOTE ADULT - PROBLEM SELECTOR RECOMMENDATION 9
Patient is a 66 year old male with acute renal failure likely due to ATN from prolonged renal hypoperfusion from poor cardiac output. Unlikely to be a glomerular process as there is no protein in the urine. Would also recommend to rule out obstruction given the patient's history of prostate cancer s/p radiation. Patient also had a recent cardiac catheterization and contrast induced nephropathy could also be a component causing renal failure.     P - please check Ua and Urine lytes   Please continue with furosemide 80 mg IV BID   Monitor strict I/Os  Please avoid nephrotoxic medications, IV contrast and NSAIDs  Patient does not need emergent dialysis at this time. Will attempt medical management today, and reevaluate in the AM for the need for dialysis.

## 2017-10-01 NOTE — PROGRESS NOTE ADULT - ASSESSMENT
66 year old male with history of DM, HLD and prostatic cancer s/p radiation therapy in 2010 was brought in by ambulance after a syncopal episode found to have 3vCAD, systolic HF with EF 25% and moderate MR. Patient transferred to [facility] for surgical evaluation and possible CABG.    Neuro: lethargic but non focal   - continue to monitor mental status    CVS: history of HLD found to have severe 3vCAD Acute on chronic systolic Heart failure BNP 70,000   - continue metoprolol 12.5 mg every 12 hours. Soft BP but tolerating.   - continue to ASA and atorvastatin.   - continue to monitor HR and BP 66 year old male with history of DM, HLD and prostatic cancer s/p radiation therapy in 2010 was brought in by ambulance after a syncopal episode found to have 3vCAD, systolic HF with EF 25% and moderate MR. Patient transferred to [facility] for surgical evaluation and possible CABG.    Neuro: lethargic but non focal   - continue to monitor mental status    CVS: history of HLD found to have severe 3vCAD Acute on chronic systolic Heart failure BNP 70,000. EF 25%   - continue metoprolol 12.5 mg every 12 hours. Soft BP but tolerating.   - continue to ASA and atorvastatin.   - continue to monitor HR and BP  - Needs Echo tomorrow.   - Carotids and lower extremity dopplers performed. Follow up results. (history of claudication)    Respiratory: + Pulmonary congestion and volume overload.   - needs diuresis. Currently getting 80mg IV lasix BID   - Requiring 6L NC.   - IS and encourage ambulation     GI: no longer having diarrhea.   - continue to monitor for BM   - holding off on bowel regimen at this time   - Protonix for GI PPX in setting of renal disease     : CKD Creatinine 5.3 today. Still makes urine. Welsh placed for I/OS. History of prostatic cancer 7 years ago. Hyponatremia likely hyervolemic hyponatremic   - Renal consulted. appreciate recs. Will attempt to diuresis with 80 Lasix BID. If no improvement tomorrow will likely start dialysis.   - Continue to monitor I/Os   - Follow up Urine lytes serum osmolarity  - I Liter fluid restriction.     Endo: TSH- 0.3 HA1c- 5.5  - follow up T3 T4     Heme: H/H stable 8.8/25  - continue to trend CBC   - heparin subq for DVT Ppx     ID: elevated WBC 14 trending up. afebrile. No active signs of infection   - continue to trend CBC  - continue to monitor for SIRS and sepsis     Dispo: medical optimzation for surgery     Rin Rodriguez PA-C

## 2017-10-02 DIAGNOSIS — D64.9 ANEMIA, UNSPECIFIED: ICD-10-CM

## 2017-10-02 DIAGNOSIS — I10 ESSENTIAL (PRIMARY) HYPERTENSION: ICD-10-CM

## 2017-10-02 LAB
ALBUMIN SERPL ELPH-MCNC: 3.2 G/DL — LOW (ref 3.3–5)
ALBUMIN SERPL ELPH-MCNC: 3.4 G/DL — SIGNIFICANT CHANGE UP (ref 3.3–5)
ALBUMIN SERPL ELPH-MCNC: 4.1 G/DL — SIGNIFICANT CHANGE UP (ref 3.3–5)
ALP SERPL-CCNC: 119 U/L — SIGNIFICANT CHANGE UP (ref 40–120)
ALP SERPL-CCNC: 120 U/L — SIGNIFICANT CHANGE UP (ref 40–120)
ALP SERPL-CCNC: 135 U/L — HIGH (ref 40–120)
ALT FLD-CCNC: 43 U/L — SIGNIFICANT CHANGE UP (ref 10–45)
ALT FLD-CCNC: 52 U/L — HIGH (ref 10–45)
ALT FLD-CCNC: 60 U/L — HIGH (ref 10–45)
ANION GAP SERPL CALC-SCNC: 17 MMOL/L — SIGNIFICANT CHANGE UP (ref 5–17)
ANION GAP SERPL CALC-SCNC: 19 MMOL/L — HIGH (ref 5–17)
ANION GAP SERPL CALC-SCNC: 19 MMOL/L — HIGH (ref 5–17)
APTT BLD: 36.9 SEC — SIGNIFICANT CHANGE UP (ref 27.5–37.4)
APTT BLD: 68.2 SEC — HIGH (ref 27.5–37.4)
APTT BLD: 72.9 SEC — HIGH (ref 27.5–37.4)
AST SERPL-CCNC: 16 U/L — SIGNIFICANT CHANGE UP (ref 10–40)
AST SERPL-CCNC: 18 U/L — SIGNIFICANT CHANGE UP (ref 10–40)
AST SERPL-CCNC: 20 U/L — SIGNIFICANT CHANGE UP (ref 10–40)
BILIRUB SERPL-MCNC: 0.3 MG/DL — SIGNIFICANT CHANGE UP (ref 0.2–1.2)
BILIRUB SERPL-MCNC: 0.5 MG/DL — SIGNIFICANT CHANGE UP (ref 0.2–1.2)
BILIRUB SERPL-MCNC: 1.8 MG/DL — HIGH (ref 0.2–1.2)
BUN SERPL-MCNC: 136 MG/DL — HIGH (ref 7–23)
BUN SERPL-MCNC: 64 MG/DL — HIGH (ref 7–23)
BUN SERPL-MCNC: 92 MG/DL — HIGH (ref 7–23)
CALCIUM SERPL-MCNC: 8.2 MG/DL — LOW (ref 8.4–10.5)
CALCIUM SERPL-MCNC: 8.4 MG/DL — SIGNIFICANT CHANGE UP (ref 8.4–10.5)
CALCIUM SERPL-MCNC: 8.7 MG/DL — SIGNIFICANT CHANGE UP (ref 8.4–10.5)
CHLORIDE SERPL-SCNC: 89 MMOL/L — LOW (ref 96–108)
CHLORIDE SERPL-SCNC: 90 MMOL/L — LOW (ref 96–108)
CHLORIDE SERPL-SCNC: 96 MMOL/L — SIGNIFICANT CHANGE UP (ref 96–108)
CO2 SERPL-SCNC: 18 MMOL/L — LOW (ref 22–31)
CO2 SERPL-SCNC: 20 MMOL/L — LOW (ref 22–31)
CO2 SERPL-SCNC: 21 MMOL/L — LOW (ref 22–31)
CREAT SERPL-MCNC: 4.1 MG/DL — HIGH (ref 0.5–1.3)
CREAT SERPL-MCNC: 4.87 MG/DL — HIGH (ref 0.5–1.3)
CREAT SERPL-MCNC: 6.2 MG/DL — HIGH (ref 0.5–1.3)
GAS PNL BLDA: SIGNIFICANT CHANGE UP
GLUCOSE SERPL-MCNC: 106 MG/DL — HIGH (ref 70–99)
GLUCOSE SERPL-MCNC: 149 MG/DL — HIGH (ref 70–99)
GLUCOSE SERPL-MCNC: 165 MG/DL — HIGH (ref 70–99)
HBV CORE AB SER-ACNC: SIGNIFICANT CHANGE UP
HBV SURFACE AB SER-ACNC: SIGNIFICANT CHANGE UP
HBV SURFACE AG SER-ACNC: SIGNIFICANT CHANGE UP
HCT VFR BLD CALC: 23.3 % — LOW (ref 39–50)
HCT VFR BLD CALC: 24.4 % — LOW (ref 39–50)
HCT VFR BLD CALC: 28.6 % — LOW (ref 39–50)
HCV AB S/CO SERPL IA: 0.1 S/CO — SIGNIFICANT CHANGE UP
HCV AB SERPL-IMP: SIGNIFICANT CHANGE UP
HGB BLD-MCNC: 10.1 G/DL — LOW (ref 13–17)
HGB BLD-MCNC: 8 G/DL — LOW (ref 13–17)
HGB BLD-MCNC: 8.3 G/DL — LOW (ref 13–17)
INR BLD: 1.11 — SIGNIFICANT CHANGE UP (ref 0.88–1.16)
INR BLD: 1.19 — HIGH (ref 0.88–1.16)
INR BLD: 1.22 — HIGH (ref 0.88–1.16)
MAGNESIUM SERPL-MCNC: 2.9 MG/DL — HIGH (ref 1.6–2.6)
MAGNESIUM SERPL-MCNC: 3.2 MG/DL — HIGH (ref 1.6–2.6)
MAGNESIUM SERPL-MCNC: 3.8 MG/DL — HIGH (ref 1.6–2.6)
MCHC RBC-ENTMCNC: 31.1 PG — SIGNIFICANT CHANGE UP (ref 27–34)
MCHC RBC-ENTMCNC: 31.1 PG — SIGNIFICANT CHANGE UP (ref 27–34)
MCHC RBC-ENTMCNC: 31.3 PG — SIGNIFICANT CHANGE UP (ref 27–34)
MCHC RBC-ENTMCNC: 34 G/DL — SIGNIFICANT CHANGE UP (ref 32–36)
MCHC RBC-ENTMCNC: 34.3 G/DL — SIGNIFICANT CHANGE UP (ref 32–36)
MCHC RBC-ENTMCNC: 35.3 G/DL — SIGNIFICANT CHANGE UP (ref 32–36)
MCV RBC AUTO: 88 FL — SIGNIFICANT CHANGE UP (ref 80–100)
MCV RBC AUTO: 90.7 FL — SIGNIFICANT CHANGE UP (ref 80–100)
MCV RBC AUTO: 92.1 FL — SIGNIFICANT CHANGE UP (ref 80–100)
PHOSPHATE SERPL-MCNC: 4.8 MG/DL — HIGH (ref 2.5–4.5)
PHOSPHATE SERPL-MCNC: 5.9 MG/DL — HIGH (ref 2.5–4.5)
PHOSPHATE SERPL-MCNC: 7.8 MG/DL — HIGH (ref 2.5–4.5)
PLATELET # BLD AUTO: 252 K/UL — SIGNIFICANT CHANGE UP (ref 150–400)
PLATELET # BLD AUTO: 290 K/UL — SIGNIFICANT CHANGE UP (ref 150–400)
PLATELET # BLD AUTO: 336 K/UL — SIGNIFICANT CHANGE UP (ref 150–400)
POTASSIUM SERPL-MCNC: 4.1 MMOL/L — SIGNIFICANT CHANGE UP (ref 3.5–5.3)
POTASSIUM SERPL-MCNC: 4.3 MMOL/L — SIGNIFICANT CHANGE UP (ref 3.5–5.3)
POTASSIUM SERPL-MCNC: 4.7 MMOL/L — SIGNIFICANT CHANGE UP (ref 3.5–5.3)
POTASSIUM SERPL-SCNC: 4.1 MMOL/L — SIGNIFICANT CHANGE UP (ref 3.5–5.3)
POTASSIUM SERPL-SCNC: 4.3 MMOL/L — SIGNIFICANT CHANGE UP (ref 3.5–5.3)
POTASSIUM SERPL-SCNC: 4.7 MMOL/L — SIGNIFICANT CHANGE UP (ref 3.5–5.3)
PROT SERPL-MCNC: 6.3 G/DL — SIGNIFICANT CHANGE UP (ref 6–8.3)
PROT SERPL-MCNC: 6.4 G/DL — SIGNIFICANT CHANGE UP (ref 6–8.3)
PROT SERPL-MCNC: 6.7 G/DL — SIGNIFICANT CHANGE UP (ref 6–8.3)
PROTHROM AB SERPL-ACNC: 12.3 SEC — SIGNIFICANT CHANGE UP (ref 9.8–12.7)
PROTHROM AB SERPL-ACNC: 13.2 SEC — HIGH (ref 9.8–12.7)
PROTHROM AB SERPL-ACNC: 13.6 SEC — HIGH (ref 9.8–12.7)
RBC # BLD: 2.57 M/UL — LOW (ref 4.2–5.8)
RBC # BLD: 2.65 M/UL — LOW (ref 4.2–5.8)
RBC # BLD: 3.25 M/UL — LOW (ref 4.2–5.8)
RBC # FLD: 13.4 % — SIGNIFICANT CHANGE UP (ref 10.3–16.9)
RBC # FLD: 13.8 % — SIGNIFICANT CHANGE UP (ref 10.3–16.9)
RBC # FLD: 13.9 % — SIGNIFICANT CHANGE UP (ref 10.3–16.9)
SODIUM SERPL-SCNC: 127 MMOL/L — LOW (ref 135–145)
SODIUM SERPL-SCNC: 128 MMOL/L — LOW (ref 135–145)
SODIUM SERPL-SCNC: 134 MMOL/L — LOW (ref 135–145)
T3 SERPL-MCNC: 52 NG/DL — LOW (ref 80–200)
T3 SERPL-MCNC: 60 NG/DL — LOW (ref 80–200)
T3FREE SERPL-MCNC: 1.13 PG/ML — LOW (ref 1.71–3.71)
T4 FREE SERPL-MCNC: 0.91 NG/DL — SIGNIFICANT CHANGE UP (ref 0.7–1.48)
WBC # BLD: 12.4 K/UL — HIGH (ref 3.8–10.5)
WBC # BLD: 12.6 K/UL — HIGH (ref 3.8–10.5)
WBC # BLD: 14.1 K/UL — HIGH (ref 3.8–10.5)
WBC # FLD AUTO: 12.4 K/UL — HIGH (ref 3.8–10.5)
WBC # FLD AUTO: 12.6 K/UL — HIGH (ref 3.8–10.5)
WBC # FLD AUTO: 14.1 K/UL — HIGH (ref 3.8–10.5)

## 2017-10-02 PROCEDURE — 99292 CRITICAL CARE ADDL 30 MIN: CPT | Mod: 25

## 2017-10-02 PROCEDURE — 93306 TTE W/DOPPLER COMPLETE: CPT | Mod: 26

## 2017-10-02 PROCEDURE — 32551 INSERTION OF CHEST TUBE: CPT | Mod: RT

## 2017-10-02 PROCEDURE — 93010 ELECTROCARDIOGRAM REPORT: CPT

## 2017-10-02 PROCEDURE — 76937 US GUIDE VASCULAR ACCESS: CPT | Mod: 26

## 2017-10-02 PROCEDURE — 71010: CPT | Mod: 26,77

## 2017-10-02 PROCEDURE — 99291 CRITICAL CARE FIRST HOUR: CPT | Mod: 25

## 2017-10-02 PROCEDURE — 90937 HEMODIALYSIS REPEATED EVAL: CPT | Mod: GC

## 2017-10-02 PROCEDURE — 94010 BREATHING CAPACITY TEST: CPT | Mod: 26

## 2017-10-02 PROCEDURE — 76770 US EXAM ABDO BACK WALL COMP: CPT | Mod: 26

## 2017-10-02 PROCEDURE — 71010: CPT | Mod: 26

## 2017-10-02 PROCEDURE — 36620 INSERTION CATHETER ARTERY: CPT

## 2017-10-02 RX ORDER — LIDOCAINE 4 G/100G
1 CREAM TOPICAL ONCE
Qty: 0 | Refills: 0 | Status: COMPLETED | OUTPATIENT
Start: 2017-10-02 | End: 2017-10-02

## 2017-10-02 RX ORDER — ALBUMIN HUMAN 25 %
50 VIAL (ML) INTRAVENOUS ONCE
Qty: 0 | Refills: 0 | Status: COMPLETED | OUTPATIENT
Start: 2017-10-02 | End: 2017-10-02

## 2017-10-02 RX ORDER — ALBUMIN HUMAN 25 %
VIAL (ML) INTRAVENOUS
Qty: 0 | Refills: 0 | Status: COMPLETED | OUTPATIENT
Start: 2017-10-02 | End: 2017-10-02

## 2017-10-02 RX ORDER — FENTANYL CITRATE 50 UG/ML
12.5 INJECTION INTRAVENOUS ONCE
Qty: 0 | Refills: 0 | Status: DISCONTINUED | OUTPATIENT
Start: 2017-10-02 | End: 2017-10-02

## 2017-10-02 RX ADMIN — Medication 3 MILLILITER(S): at 12:29

## 2017-10-02 RX ADMIN — Medication 2: at 06:01

## 2017-10-02 RX ADMIN — Medication 650 MILLIGRAM(S): at 21:40

## 2017-10-02 RX ADMIN — SODIUM CHLORIDE 3 MILLILITER(S): 9 INJECTION INTRAMUSCULAR; INTRAVENOUS; SUBCUTANEOUS at 05:55

## 2017-10-02 RX ADMIN — Medication 650 MILLIGRAM(S): at 21:05

## 2017-10-02 RX ADMIN — ATORVASTATIN CALCIUM 40 MILLIGRAM(S): 80 TABLET, FILM COATED ORAL at 21:05

## 2017-10-02 RX ADMIN — Medication 2: at 17:48

## 2017-10-02 RX ADMIN — SODIUM CHLORIDE 3 MILLILITER(S): 9 INJECTION INTRAMUSCULAR; INTRAVENOUS; SUBCUTANEOUS at 15:12

## 2017-10-02 RX ADMIN — HEPARIN SODIUM 5000 UNIT(S): 5000 INJECTION INTRAVENOUS; SUBCUTANEOUS at 05:53

## 2017-10-02 RX ADMIN — Medication 50 MILLILITER(S): at 18:45

## 2017-10-02 RX ADMIN — Medication 50 MILLILITER(S): at 00:20

## 2017-10-02 RX ADMIN — Medication 50 MILLILITER(S): at 17:43

## 2017-10-02 RX ADMIN — Medication 80 MILLIGRAM(S): at 05:53

## 2017-10-02 RX ADMIN — Medication 50 MILLILITER(S): at 01:20

## 2017-10-02 RX ADMIN — LIDOCAINE 1 PATCH: 4 CREAM TOPICAL at 22:12

## 2017-10-02 RX ADMIN — Medication 81 MILLIGRAM(S): at 11:55

## 2017-10-02 RX ADMIN — SODIUM CHLORIDE 3 MILLILITER(S): 9 INJECTION INTRAMUSCULAR; INTRAVENOUS; SUBCUTANEOUS at 21:02

## 2017-10-02 RX ADMIN — Medication 3 MILLILITER(S): at 05:46

## 2017-10-02 RX ADMIN — PANTOPRAZOLE SODIUM 40 MILLIGRAM(S): 20 TABLET, DELAYED RELEASE ORAL at 06:02

## 2017-10-02 RX ADMIN — HEPARIN SODIUM 5000 UNIT(S): 5000 INJECTION INTRAVENOUS; SUBCUTANEOUS at 21:05

## 2017-10-02 RX ADMIN — Medication 3 MILLILITER(S): at 17:08

## 2017-10-02 RX ADMIN — Medication 650 MILLIGRAM(S): at 12:15

## 2017-10-02 RX ADMIN — HEPARIN SODIUM 5000 UNIT(S): 5000 INJECTION INTRAVENOUS; SUBCUTANEOUS at 15:23

## 2017-10-02 RX ADMIN — Medication 3 MILLILITER(S): at 00:30

## 2017-10-02 RX ADMIN — Medication 2: at 11:55

## 2017-10-02 RX ADMIN — Medication 650 MILLIGRAM(S): at 13:21

## 2017-10-02 NOTE — PROGRESS NOTE ADULT - ASSESSMENT
This is 66 year old male with PMH stated above. Now in th CT ICU - for possible CABG. Started on HD last night for oliguric YOANDY (could be due to the cardiorenal syndrom and/or Contrast induced nephroapthy). he tolerated well the procedure last night. Still in fluid overload, oliguric at bedside. We will do another session of HD today

## 2017-10-02 NOTE — PROGRESS NOTE ADULT - SUBJECTIVE AND OBJECTIVE BOX
CTICU  CRITICAL  CARE  attending     Hand off received 					   Pertinent clinical, laboratory, radiographic, hemodynamic, echocardiographic, respiratory data, microbiologic data and chart were reviewed and analyzed frequently throughout the course of the day and night  Patient seen and examined with CTS/ SH attending at bedside  Pt is a 66y , Male, HEALTH ISSUES - PROBLEM Dx:  Anemia: Anemia  HTN (hypertension): HTN (hypertension)  Coronary artery disease: Coronary artery disease  Hyponatremia: Hyponatremia  Acute renal failure: Acute renal failure      , FAMILY HISTORY:  No pertinent family history in first degree relatives  PAST MEDICAL & SURGICAL HISTORY:  Prostatic cancer  Diabetes mellitus  HTN (hypertension)  No significant past surgical history    Patient is a 66y old  Male who presents with a chief complaint of CAD (30 Sep 2017 17:19)      14 system review was unremarkable  acute changes include acute respiratory failure  Vital signs, hemodynamic and respiratory parameters were reviewed from the bedside nursing flowsheet.  ICU Vital Signs Last 24 Hrs  T(C): 36.2 (02 Oct 2017 16:15), Max: 37 (02 Oct 2017 09:24)  T(F): 97.2 (02 Oct 2017 16:15), Max: 98.6 (02 Oct 2017 09:24)  HR: 78 (02 Oct 2017 18:00) (72 - 82)  BP: 85/57 (02 Oct 2017 10:00) (85/57 - 123/76)  BP(mean): 65 (02 Oct 2017 10:00) (65 - 88)  ABP: 116/56 (02 Oct 2017 18:00) (96/50 - 116/70)  ABP(mean): 76 (02 Oct 2017 18:00) (64 - 86)  RR: 15 (02 Oct 2017 18:00) (13 - 24)  SpO2: 99% (02 Oct 2017 18:00) (93% - 99%)    Adult Advanced Hemodynamics Last 24 Hrs  CVP(mm Hg): --  CVP(cm H2O): --  CO: --  CI: --  PA: --  PA(mean): --  PCWP: --  SVR: --  SVRI: --  PVR: --  PVRI: --,     Intake and output was reviewed and the fluid balance was calculated  Daily     Daily Weight in k.7 (02 Oct 2017 16:15)  I&O's Summary    01 Oct 2017 07:01  -  02 Oct 2017 07:00  --------------------------------------------------------  IN: 720 mL / OUT: 2162 mL / NET: -1442 mL    02 Oct 2017 07:01  -  02 Oct 2017 18:16  --------------------------------------------------------  IN: 1045 mL / OUT: 2010 mL / NET: -965 mL        All lines and drain sites were assessed  Glycemic trend was reviewedCAPILLARY BLOOD GLUCOSE  164 (02 Oct 2017 17:00)        No acute change in mental status  Auscultation of the chest reveals equal bs  Abdomen is soft  Extremities are warm and well perfused  Wounds appear clean and unremarkable  Antibiotics are periop    labs  CBC Full  -  ( 02 Oct 2017 05:49 )  WBC Count : 14.1 K/uL  Hemoglobin : 8.0 g/dL  Hematocrit : 23.3 %  Platelet Count - Automated : 290 K/uL  Mean Cell Volume : 90.7 fL  Mean Cell Hemoglobin : 31.1 pg  Mean Cell Hemoglobin Concentration : 34.3 g/dL  Auto Neutrophil # : x  Auto Lymphocyte # : x  Auto Monocyte # : x  Auto Eosinophil # : x  Auto Basophil # : x  Auto Neutrophil % : x  Auto Lymphocyte % : x  Auto Monocyte % : x  Auto Eosinophil % : x  Auto Basophil % : x    10-02    128<L>  |  89<L>  |  92<H>  ----------------------------<  149<H>  4.1   |  20<L>  |  4.87<H>    Ca    8.2<L>      02 Oct 2017 05:49  Phos  5.9     10-02  Mg     3.2     10-    TPro  6.3  /  Alb  3.2<L>  /  TBili  0.5  /  DBili  x   /  AST  18  /  ALT  52<H>  /  AlkPhos  119  10-02    PT/INR - ( 02 Oct 2017 05:49 )   PT: 13.2 sec;   INR: 1.19          PTT - ( 02 Oct 2017 05:49 )  PTT:72.9 sec  The current medications were reviewed   MEDICATIONS  (STANDING):  sodium chloride 0.9% lock flush 3 milliLiter(s) IV Push every 8 hours  aspirin enteric coated 81 milliGRAM(s) Oral daily  metoprolol 12.5 milliGRAM(s) Oral two times a day  atorvastatin 40 milliGRAM(s) Oral at bedtime  pantoprazole    Tablet 40 milliGRAM(s) Oral before breakfast  heparin  Injectable 5000 Unit(s) SubCutaneous every 8 hours  ALBUTerol/ipratropium for Nebulization 3 milliLiter(s) Nebulizer every 6 hours  insulin lispro (HumaLOG) corrective regimen sliding scale   SubCutaneous Before meals and at bedtime  dextrose 5%. 1000 milliLiter(s) (50 mL/Hr) IV Continuous <Continuous>  dextrose 50% Injectable 12.5 Gram(s) IV Push once  dextrose 50% Injectable 25 Gram(s) IV Push once  dextrose 50% Injectable 25 Gram(s) IV Push once  albumin human 25% IVPB      albumin human 25% IVPB 50 milliLiter(s) IV Intermittent once    MEDICATIONS  (PRN):  acetaminophen   Tablet. 650 milliGRAM(s) Oral every 6 hours PRN Mild Pain (1 - 3)  dextrose Gel 1 Dose(s) Oral once PRN Blood Glucose LESS THAN 70 milliGRAM(s)/deciliter  glucagon  Injectable 1 milliGRAM(s) IntraMuscular once PRN Glucose LESS THAN 70 milligrams/deciliter       PROBLEM LIST/ ASSESSMENT:  HEALTH ISSUES - PROBLEM Dx:  Anemia: Anemia  HTN (hypertension): HTN (hypertension)  Coronary artery disease: Coronary artery disease  Hyponatremia: Hyponatremia  Acute renal failure: Acute renal failure      ,   Patient is a 66y old  Male who presents with a chief complaint of CAD (30 Sep 2017 17:19)      acute changes include acute respiratory failure rolan on cri    My plan includes :  close hemodynamic, ventilatory and drain monitoring and management per post op routine    Monitor for arrhythmias and monitor parameters for organ perfusion  monitor neurologic status  Head of the bed should remain elevated to 45 deg .   chest PT and IS will be encouraged  monitor adequacy of oxygenation and ventilation and attempt to wean oxygen  Nutritional goals will be met using po eventually , ensure adequate caloric intake and montior the same  Stress ulcer and VTE prophylaxis will be achieved    Glycemic control is satisfactory  Electrolytes have been repleted as necessary and wound care has been carried out. Pain control has been achieved.   agressive physical therapy and early mobility and ambulation goals will be met   The family was updated about the course and plan  CRITICAL CARE TIME SPENT in evaluation and management, reassessments, review and interpretation of labs and x-rays, ventilator and hemodynamic management, formulating a plan and coordinating care: ___90____ MIN.  Time does not include procedural time.  CTICU ATTENDING     					    Romie Akbar MD

## 2017-10-02 NOTE — PROGRESS NOTE ADULT - PROBLEM SELECTOR PLAN 1
- most likely due to cardiorenal syndrome and/or contrast induced   - was started on HD on 10/1-2/2017 overnight - got 150 minutes and 1.5 liters of UF   - has a HD catheter on the right IJ - mild bleeding - compression applied   - still oliguric this morning   - with fluid overload ( possible pulmonary edema)  - please obtain u/s of the bladder and kidneys   - please him on renal diet   - we will do extra session of HD today   - please limit the fluid intake (the patient instructed for that also)  - follow in and outs

## 2017-10-02 NOTE — PROGRESS NOTE ADULT - SUBJECTIVE AND OBJECTIVE BOX
Objective and subjective   Mr Roldan is in his bed in his room this morning, sleeping, easily arousable, has a compression on his neck around the central catheter for HD, on nasal cannula. Feels a little better, mild shortness of breath. No chest pain or fever.     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 Here over the past 24 hours found to have worsening of the kidney fiunction - oliguria and fluid overload and overnight 10/1-2017 was started on HD after placement of Hd catheter on the right IJ. He got  150 minutes of HD with 1.5 liters fluid removal     Patient seen and examined at bedside.     sodium chloride 0.9% lock flush 3 milliLiter(s) every 8 hours  aspirin enteric coated 81 milliGRAM(s) daily  metoprolol 12.5 milliGRAM(s) two times a day  atorvastatin 40 milliGRAM(s) at bedtime  pantoprazole    Tablet 40 milliGRAM(s) before breakfast  heparin  Injectable 5000 Unit(s) every 8 hours  ALBUTerol/ipratropium for Nebulization 3 milliLiter(s) every 6 hours  acetaminophen   Tablet. 650 milliGRAM(s) every 6 hours PRN  insulin lispro (HumaLOG) corrective regimen sliding scale   Before meals and at bedtime  dextrose 5%. 1000 milliLiter(s) <Continuous>  dextrose Gel 1 Dose(s) once PRN  dextrose 50% Injectable 12.5 Gram(s) once  dextrose 50% Injectable 25 Gram(s) once  dextrose 50% Injectable 25 Gram(s) once  glucagon  Injectable 1 milliGRAM(s) once PRN  albumin human 25% IVPB     albumin human 25% IVPB 50 milliLiter(s) once  albumin human 25% IVPB 50 milliLiter(s) once      Allergies    No Known Allergies    Intolerances        T(C): , Max: 37 (10-02-17 @ 09:24)  T(F): , Max: 98.6 (10-02-17 @ 09:24)  HR: 80 (10-02-17 @ 11:00)  BP: 85/57 (10-02-17 @ 10:00)  BP(mean): 65 (10-02-17 @ 10:00)  RR: 20 (10-02-17 @ 11:00)  SpO2: 96% (10-02-17 @ 11:00)  Wt(kg): --    10-01 @ 07:  -  10-02 @ 07:00  --------------------------------------------------------  IN:    Oral Fluid: 720 mL  Total IN: 720 mL    OUT:    Indwelling Catheter - Urethral: 662 mL    Other: 1500 mL  Total OUT: 2162 mL    Total NET: -1442 mL      10-02 @ 07:  -  10-02 @ 11:47  --------------------------------------------------------  IN:    Oral Fluid: 210 mL  Total IN: 210 mL    OUT:    Chest Tube: 1700 mL    Indwelling Catheter - Urethral: 15 mL  Total OUT: 1715 mL    Total NET: -1505 mL      Physical exam:   Alert and oriented   HAs a HD catheter on the right IJ   Not in acute respiratory distress on nasal cannula   Normal air entry in the lungs, no wheezing, no crackles, decreased breath sounds on the right base   RRR, normal s1/s2, no murmurs, rubs or gallops   Abdomen soft, non-tender, normal bowel sounds   Mild peripheral edema - chronic changes over the lower extremities           LABS:                        8.0    14.1  )-----------( 290      ( 02 Oct 2017 05:49 )             23.3     10    128<L>  |  89<L>  |  92<H>  ----------------------------<  149<H>  4.1   |  20<L>  |  4.87<H>    Ca    8.2<L>      02 Oct 2017 05:49  Phos  5.9     10-02  Mg     3.2     10-02    TPro  6.3  /  Alb  3.2<L>  /  TBili  0.5  /  DBili  x   /  AST  18  /  ALT  52<H>  /  AlkPhos  119  10-02    Osmolality, Serum: 321 mosm/kg <H> [280 - 301] (10-01 @ 17:10)    PT/INR - ( 02 Oct 2017 05:49 )   PT: 13.2 sec;   INR: 1.19          PTT - ( 02 Oct 2017 05:49 )  PTT:72.9 sec  Urinalysis Basic - ( 01 Oct 2017 10:38 )    Color: Yellow / Appearance: Clear / S.020 / pH: x  Gluc: x / Ketone: NEGATIVE  / Bili: NEGATIVE / Urobili: 0.2 E.U./dL   Blood: x / Protein: NEGATIVE mg/dL / Nitrite: NEGATIVE   Leuk Esterase: NEGATIVE / RBC: 5-10 /HPF / WBC > 10 /HPF   Sq Epi: x / Non Sq Epi: Few /HPF / Bacteria: Present /HPF      Chloride, Random Urine: 35 mmol/L (10-01 @ 10:39)  Sodium, Random Urine: 36 mmol/L (10-01 @ 10:39)        RADIOLOGY & ADDITIONAL STUDIES:      < from: Xray Chest 1 View AP -PORTABLE-Routine (10.02.17 @ 04:26) >  Right IJ central venous catheter with the tip in the cavoatrial junction.   Extensive upper and lower lobe bilateral alveolar opacities. Not   significantly changed in comparison to prior. The cardiac wrist on   silhouette is unremarkable. Bilateral pleural effusions are present,   right greater than left.      IMPRESSION: Extensive bilateral upper and lower lobe alveolar opacities.      < end of copied text >

## 2017-10-02 NOTE — PROGRESS NOTE ADULT - SUBJECTIVE AND OBJECTIVE BOX
Patient was seen and evaluated on dialysis.   Patient is tolerating the procedure well.   HR: 76 (10-02-17 @ 15:00)  BP: 104/67  Continue dialysis:   Dialyzer:  160       QB: 400       QD: 500   Goal UF 2.5 over 3 Hours       The blood pressure is 100/70 The patient is stable during the HD, no complains

## 2017-10-03 ENCOUNTER — APPOINTMENT (OUTPATIENT)
Dept: CARDIOTHORACIC SURGERY | Facility: HOSPITAL | Age: 67
End: 2017-10-03

## 2017-10-03 LAB
ALBUMIN SERPL ELPH-MCNC: 3.7 G/DL — SIGNIFICANT CHANGE UP (ref 3.3–5)
ALP SERPL-CCNC: 116 U/L — SIGNIFICANT CHANGE UP (ref 40–120)
ALT FLD-CCNC: 41 U/L — SIGNIFICANT CHANGE UP (ref 10–45)
ANION GAP SERPL CALC-SCNC: 18 MMOL/L — HIGH (ref 5–17)
APTT BLD: 45.1 SEC — HIGH (ref 27.5–37.4)
AST SERPL-CCNC: 16 U/L — SIGNIFICANT CHANGE UP (ref 10–40)
BILIRUB SERPL-MCNC: 1.3 MG/DL — HIGH (ref 0.2–1.2)
BUN SERPL-MCNC: 67 MG/DL — HIGH (ref 7–23)
CALCIUM SERPL-MCNC: 8.6 MG/DL — SIGNIFICANT CHANGE UP (ref 8.4–10.5)
CHLORIDE SERPL-SCNC: 96 MMOL/L — SIGNIFICANT CHANGE UP (ref 96–108)
CO2 SERPL-SCNC: 20 MMOL/L — LOW (ref 22–31)
CREAT SERPL-MCNC: 4.44 MG/DL — HIGH (ref 0.5–1.3)
GAS PNL BLDA: SIGNIFICANT CHANGE UP
GLUCOSE SERPL-MCNC: 142 MG/DL — HIGH (ref 70–99)
HCT VFR BLD CALC: 28.7 % — LOW (ref 39–50)
HGB BLD-MCNC: 10 G/DL — LOW (ref 13–17)
INR BLD: 1.2 — HIGH (ref 0.88–1.16)
MAGNESIUM SERPL-MCNC: 2.8 MG/DL — HIGH (ref 1.6–2.6)
MCHC RBC-ENTMCNC: 30.8 PG — SIGNIFICANT CHANGE UP (ref 27–34)
MCHC RBC-ENTMCNC: 34.8 G/DL — SIGNIFICANT CHANGE UP (ref 32–36)
MCV RBC AUTO: 88.3 FL — SIGNIFICANT CHANGE UP (ref 80–100)
PHOSPHATE SERPL-MCNC: 6 MG/DL — HIGH (ref 2.5–4.5)
PLATELET # BLD AUTO: 253 K/UL — SIGNIFICANT CHANGE UP (ref 150–400)
POTASSIUM SERPL-MCNC: 4.4 MMOL/L — SIGNIFICANT CHANGE UP (ref 3.5–5.3)
POTASSIUM SERPL-SCNC: 4.4 MMOL/L — SIGNIFICANT CHANGE UP (ref 3.5–5.3)
PROT SERPL-MCNC: 6.4 G/DL — SIGNIFICANT CHANGE UP (ref 6–8.3)
PROTHROM AB SERPL-ACNC: 13.4 SEC — HIGH (ref 9.8–12.7)
RBC # BLD: 3.25 M/UL — LOW (ref 4.2–5.8)
RBC # FLD: 14.2 % — SIGNIFICANT CHANGE UP (ref 10.3–16.9)
SODIUM SERPL-SCNC: 134 MMOL/L — LOW (ref 135–145)
WBC # BLD: 12.3 K/UL — HIGH (ref 3.8–10.5)
WBC # FLD AUTO: 12.3 K/UL — HIGH (ref 3.8–10.5)

## 2017-10-03 PROCEDURE — 93010 ELECTROCARDIOGRAM REPORT: CPT

## 2017-10-03 PROCEDURE — 71010: CPT | Mod: 26

## 2017-10-03 PROCEDURE — 90935 HEMODIALYSIS ONE EVALUATION: CPT | Mod: GC

## 2017-10-03 PROCEDURE — 99291 CRITICAL CARE FIRST HOUR: CPT

## 2017-10-03 RX ORDER — CHLORHEXIDINE GLUCONATE 213 G/1000ML
10 SOLUTION TOPICAL ONCE
Qty: 0 | Refills: 0 | Status: COMPLETED | OUTPATIENT
Start: 2017-10-03 | End: 2017-10-04

## 2017-10-03 RX ORDER — CHLORHEXIDINE GLUCONATE 213 G/1000ML
1 SOLUTION TOPICAL ONCE
Qty: 0 | Refills: 0 | Status: COMPLETED | OUTPATIENT
Start: 2017-10-03 | End: 2017-10-03

## 2017-10-03 RX ADMIN — Medication 2: at 08:10

## 2017-10-03 RX ADMIN — PANTOPRAZOLE SODIUM 40 MILLIGRAM(S): 20 TABLET, DELAYED RELEASE ORAL at 06:16

## 2017-10-03 RX ADMIN — Medication 3 MILLILITER(S): at 05:58

## 2017-10-03 RX ADMIN — Medication 3 MILLILITER(S): at 00:37

## 2017-10-03 RX ADMIN — Medication 12.5 MILLIGRAM(S): at 18:53

## 2017-10-03 RX ADMIN — HEPARIN SODIUM 5000 UNIT(S): 5000 INJECTION INTRAVENOUS; SUBCUTANEOUS at 22:55

## 2017-10-03 RX ADMIN — ATORVASTATIN CALCIUM 40 MILLIGRAM(S): 80 TABLET, FILM COATED ORAL at 22:55

## 2017-10-03 RX ADMIN — SODIUM CHLORIDE 3 MILLILITER(S): 9 INJECTION INTRAMUSCULAR; INTRAVENOUS; SUBCUTANEOUS at 14:54

## 2017-10-03 RX ADMIN — LIDOCAINE 1 PATCH: 4 CREAM TOPICAL at 09:15

## 2017-10-03 RX ADMIN — FENTANYL CITRATE 12.5 MICROGRAM(S): 50 INJECTION INTRAVENOUS at 01:34

## 2017-10-03 RX ADMIN — CHLORHEXIDINE GLUCONATE 1 APPLICATION(S): 213 SOLUTION TOPICAL at 22:00

## 2017-10-03 RX ADMIN — SODIUM CHLORIDE 3 MILLILITER(S): 9 INJECTION INTRAMUSCULAR; INTRAVENOUS; SUBCUTANEOUS at 22:57

## 2017-10-03 RX ADMIN — Medication 3 MILLILITER(S): at 12:05

## 2017-10-03 RX ADMIN — Medication 12.5 MILLIGRAM(S): at 06:15

## 2017-10-03 RX ADMIN — Medication 650 MILLIGRAM(S): at 12:00

## 2017-10-03 RX ADMIN — Medication 3 MILLILITER(S): at 23:59

## 2017-10-03 RX ADMIN — Medication 2: at 22:55

## 2017-10-03 RX ADMIN — FENTANYL CITRATE 12.5 MICROGRAM(S): 50 INJECTION INTRAVENOUS at 01:13

## 2017-10-03 RX ADMIN — SODIUM CHLORIDE 3 MILLILITER(S): 9 INJECTION INTRAMUSCULAR; INTRAVENOUS; SUBCUTANEOUS at 06:16

## 2017-10-03 RX ADMIN — Medication 81 MILLIGRAM(S): at 12:13

## 2017-10-03 RX ADMIN — Medication 650 MILLIGRAM(S): at 13:00

## 2017-10-03 NOTE — PROGRESS NOTE ADULT - SUBJECTIVE AND OBJECTIVE BOX
INTERVAL HPI/OVERNIGHT EVENTS:    PreOp assessment - possible OR 10/4  EF 25-30%    67yo male Hx DM, chol. prostate Ca - XRT '10 presenting with syncope  reported multiple days of diarrhea after taking laxatives for constipation - on assessment - elevated Cr/hyponatremia/elevated trop but no ischemic changes on EKG - (+)NSTEMI  IVF resuscitation with improvement    Cath 9/29: EF 25% and 3vCAD ( MidLAD 80%, distal LAD diagonal 85%, Prox circ 95%, . left PDA 65%, Prox RCA 70, Mid RCA 85% and Distal PCA 90%)  ECHO: Mod TR    Referred for CTS evaluation  transferred to ICU for RRT - RIJ Shiley placed 10/1  kaitlyn placed ; right pigtail placed for effusion - 2L out  plan preop evaluation and optimization - possible OR in am  No acute events reported overnight    d/w renal attending - plan HD with plan for 2L fluid removal today  CT Head as part of pre-op assessment per CTS     PMHx includes but is not limited to:  Prostatic cancer  DM  HTN    ICU Vital Signs Last 24 Hrs  T(C): 36.1 (03 Oct 2017 09:55), Max: 37.2 (03 Oct 2017 01:16)  T(F): 97 (03 Oct 2017 09:55), Max: 98.9 (03 Oct 2017 01:16)  HR: 72 (03 Oct 2017 10:00) (71 - 88) sinus  ABP: 96/46 (03 Oct 2017 10:00) (96/46 - 120/58)  ABP(mean): 62 (03 Oct 2017 10:00) (62 - 80)    SpO2: 96% (03 Oct 2017 10:00) (91% - 100%) on 6L NC    Qtts: None    I&O's Summary    02 Oct 2017 07:01  -  03 Oct 2017 07:00  --------------------------------------------------------  IN: 1245 mL / OUT: 5215 mL / NET: -3970 mL    03 Oct 2017 07:01  -  03 Oct 2017 12:03  --------------------------------------------------------  IN: 120 mL / OUT: 135 mL / NET: -15 mL    Physical Exam    Heart  Lungs  Abd  Ext  Chest  Neuro  Skin    LABS:                        10.0   12.3  )-----------( 253      ( 03 Oct 2017 03:03 )             28.7     10-03    134<L>  |  96  |  67<H>  ----------------------------<  142<H>  4.4   |  20<L>  |  4.44<H>    Ca    8.6      03 Oct 2017 03:03  Phos  6.0     10-03  Mg     2.8     10-03    TPro  6.4  /  Alb  3.7  /  TBili  1.3<H>  /  DBili  x   /  AST  16  /  ALT  41  /  AlkPhos  116  10-03    PT/INR - ( 03 Oct 2017 03:03 )   PT: 13.4 sec;   INR: 1.20          PTT - ( 03 Oct 2017 03:03 )  PTT:45.1 sec    ABG - ( 03 Oct 2017 03:09 )  pH: 7.48  /  pCO2: 30    /  pO2: 78    / HCO3: 21    / Base Excess: -1.6  /  SaO2: 96                  RADIOLOGY & ADDITIONAL STUDIES:    I have spent/provided stated minutes of critical care time to this patient: INTERVAL HPI/OVERNIGHT EVENTS:    PreOp assessment - possible OR 10/4  EF 25-30%    67yo male Hx DM, chol. prostate Ca - XRT '10 presenting with near syncope  reported multiple days of diarrhea after taking laxatives for constipation - on assessment - elevated Cr/hyponatremia/elevated trop but no ischemic changes on EKG - (+)NSTEMI  IVF resuscitation with improvement  reported recent admit for hypoglycemia and recent course Abx for "viral syndrome" per PMD    Cath 9/29: EF 25% and 3vCAD ( MidLAD 80%, distal LAD diagonal 85%, Prox circ 95%, . left PDA 65%, Prox RCA 70, Mid RCA 85% and Distal PCA 90%)  ECHO: Mod TR    Referred for CTS evaluation  transferred to ICU for RRT - RIJ Shiley placed 10/1  kaitlyn placed ; right pigtail placed for effusion - 2L out  plan preop evaluation and optimization - possible OR in am  No acute events reported overnight    d/w renal attending - plan HD with plan for 2L fluid removal today  CT Head as part of pre-op assessment per CTS     Patient OOB in chair this am - denies complaints when asked directly    PMHx includes but is not limited to:  Prostatic cancer  DM  HTN    ICU Vital Signs Last 24 Hrs  T(C): 36.1 (03 Oct 2017 09:55), Max: 37.2 (03 Oct 2017 01:16)  T(F): 97 (03 Oct 2017 09:55), Max: 98.9 (03 Oct 2017 01:16)  HR: 72 (03 Oct 2017 10:00) (71 - 88) sinus  ABP: 96/46 (03 Oct 2017 10:00) (96/46 - 120/58)  ABP(mean): 62 (03 Oct 2017 10:00) (62 - 80)  SpO2: 96% (03 Oct 2017 10:00) (91% - 100%) on 6L NC    Qtts: None    I&O's Summary    02 Oct 2017 07:01  -  03 Oct 2017 07:00  --------------------------------------------------------  IN: 1245 mL / OUT: 5215 mL / NET: -3970 mL    03 Oct 2017 07:01  -  03 Oct 2017 12:03  --------------------------------------------------------  IN: 120 mL / OUT: 135 mL / NET: -15 mL    Physical Exam    Heart  Lungs  Abd  Ext  Chest  Neuro  Skin    LABS:                        10.0   12.3  )-----------( 253      ( 03 Oct 2017 03:03 )             28.7     10-03    134<L>  |  96  |  67<H>  ----------------------------<  142<H>  4.4   |  20<L>  |  4.44<H>    Ca    8.6      03 Oct 2017 03:03  Phos  6.0     10-03  Mg     2.8     10-03    TPro  6.4  /  Alb  3.7  /  TBili  1.3<H>  /  DBili  x   /  AST  16  /  ALT  41  /  AlkPhos  116  10-03    PT/INR - ( 03 Oct 2017 03:03 )   PT: 13.4 sec;   INR: 1.20          PTT - ( 03 Oct 2017 03:03 )  PTT:45.1 sec    ABG - ( 03 Oct 2017 03:09 )  pH: 7.48  /  pCO2: 30    /  pO2: 78    / HCO3: 21    / Base Excess: -1.6  /  SaO2: 96                  RADIOLOGY & ADDITIONAL STUDIES:    I have spent/provided stated minutes of critical care time to this patient: INTERVAL HPI/OVERNIGHT EVENTS:    PreOp assessment - possible OR 10/4  EF 25-30%    65yo male Hx DM, chol. prostate Ca - XRT '10 presenting with near syncope  reported multiple days of diarrhea after taking laxatives for constipation - on assessment - elevated Cr/hyponatremia/elevated trop but no ischemic changes on EKG - (+)NSTEMI  IVF resuscitation with improvement  reported recent admit for hypoglycemia and recent course Abx for "viral syndrome" per PMD    Cath 9/29: EF 25% and 3vCAD ( MidLAD 80%, distal LAD diagonal 85%, Prox circ 95%, . left PDA 65%, Prox RCA 70, Mid RCA 85% and Distal PCA 90%)  ECHO: Mod TR    Referred for CTS evaluation  transferred to ICU for RRT - RIJ Shiley placed 10/1  kaitlyn placed ; right pigtail placed for effusion - 2L out  plan preop evaluation and optimization - possible OR in am  No acute events reported overnight    d/w renal attending - plan HD with plan for 2L fluid removal today  CT Head as part of pre-op assessment per CTS     Patient OOB in chair this am - denies complaints when asked directly    PMHx includes but is not limited to:  Prostatic cancer  DM  HTN    ICU Vital Signs Last 24 Hrs  T(C): 36.1 (03 Oct 2017 09:55), Max: 37.2 (03 Oct 2017 01:16)  T(F): 97 (03 Oct 2017 09:55), Max: 98.9 (03 Oct 2017 01:16)  HR: 72 (03 Oct 2017 10:00) (71 - 88) sinus  ABP: 96/46 (03 Oct 2017 10:00) (96/46 - 120/58)  ABP(mean): 62 (03 Oct 2017 10:00) (62 - 80)  SpO2: 96% (03 Oct 2017 10:00) (91% - 100%) on 6L NC    Qtts: None    I&O's Summary    02 Oct 2017 07:01  -  03 Oct 2017 07:00  --------------------------------------------------------  IN: 1245 mL / OUT: 5215 mL / NET: -3970 mL    03 Oct 2017 07:01  -  03 Oct 2017 12:03  --------------------------------------------------------  IN: 120 mL / OUT: 135 mL / NET: -15 mL    Physical Exam    Heart - regular (-)rub/gallop  Lungs - CTA anterior (-)rales/rhonchi/wheeze  Abd - (+)BS soft (-)r/r/g  Ext - warm to touch. no cyanosis/clubbing  Neuro - alert/oriented - somewhat slowed speech, but non-focal and moving all extremities  Skin - no rash    LABS:                        10.0   12.3  )-----------( 253      ( 03 Oct 2017 03:03 )             28.7     10-03    134<L>  |  96  |  67<H>  ----------------------------<  142<H>  4.4   |  20<L>  |  4.44<H>    Ca    8.6      03 Oct 2017 03:03  Phos  6.0     10-03  Mg     2.8     10-03    TPro  6.4  /  Alb  3.7  /  TBili  1.3<H>  /  DBili  x   /  AST  16  /  ALT  41  /  AlkPhos  116  10-03    PT/INR - ( 03 Oct 2017 03:03 )   PT: 13.4 sec;   INR: 1.20     PTT - ( 03 Oct 2017 03:03 )  PTT:45.1 sec    ABG - ( 03 Oct 2017 03:09 ) 7.48/30/78/96    RADIOLOGY & ADDITIONAL STUDIES: reviewed    patient with recent near-syncope event with workup revealing NSTEMI - new onset YOANDY/ARF on RRT - ongoing pre-op optimization and assessment    1. CV  hemodynamically stable - asymptomatic  cont ASA/statin  on metoprolol 12.5 BID - with hold parameters - titrate as BP/HR allows  ECHO/Carotid/PFT  CT Head as pre-op assessment per surgery  possible OR 10/4 - defer to CTS    2. Pulm   large effusion - s/p pigtail - 2L out  still with some drainage - 30-100cc/h noted - monitor output  titrate supplemental oxygen down - now on 2L NC with good Sa02  cont nebs    3. Endocrine  maintain glycemic control  per patient - recent hospitalization at [facility] for hypoglycemic  POC testing 154/170  cont ISS    DVT and GI prophylaxis  d/w patient/staff and CTS    I have spent/provided stated minutes of critical care time to this patient: 60 min

## 2017-10-03 NOTE — PROGRESS NOTE ADULT - SUBJECTIVE AND OBJECTIVE BOX
Planned Date of Surgery:  10/4/17                                                                                                                Surgeon: Dr. Sorenson    Procedure: CABG    HPI:  66 year old male with history of DM, HLD and prostatic cancer s/p radiation therapy in 2010 was brought in by ambulance after a syncopal episode. Patient was having multiple days of diarrhea after taking laxative for constipation. In the ED at [place] patient's labs were significant for elevated troponins without EKG changes, elevated Creatinine and Hyponatremia. The YOANDY and hyponatremia was presumed to be due to gastric losses and both recovered with fluid resuscitation. On 9/29/17 patient underwent cardiac catherization for NSTEMI and was found to have EF of 25% and 3vCAD ( MidLAD 80%, distal LAD 80%, Diagonal 85%, Prox circ 95%, . left PDA 65%, Prox RCA 70, Mid RCA 85% and Distal PCA 90%). Echo demonstrated moderate Tricuspid regurgitation. In light of 3vCAD patient was transferred to [facility] for CABG work up.    PAST MEDICAL & SURGICAL HISTORY:  Prostatic cancer  Diabetes mellitus  HTN (hypertension)  No significant past surgical history      No Known Allergies      MEDICATIONS  (STANDING):  ALBUTerol/ipratropium for Nebulization 3 milliLiter(s) Nebulizer every 6 hours  aspirin enteric coated 81 milliGRAM(s) Oral daily  atorvastatin 40 milliGRAM(s) Oral at bedtime  chlorhexidine 0.12% Liquid 10 milliLiter(s) Swish and Spit once  chlorhexidine 4% Liquid 1 Application(s) Topical once  dextrose 5%. 1000 milliLiter(s) (50 mL/Hr) IV Continuous <Continuous>  dextrose 50% Injectable 12.5 Gram(s) IV Push once  dextrose 50% Injectable 25 Gram(s) IV Push once  dextrose 50% Injectable 25 Gram(s) IV Push once  heparin  Injectable 5000 Unit(s) SubCutaneous every 8 hours  insulin lispro (HumaLOG) corrective regimen sliding scale   SubCutaneous Before meals and at bedtime  metoprolol 12.5 milliGRAM(s) Oral two times a day  pantoprazole    Tablet 40 milliGRAM(s) Oral before breakfast  sodium chloride 0.9% lock flush 3 milliLiter(s) IV Push every 8 hours    MEDICATIONS  (PRN):  acetaminophen   Tablet. 650 milliGRAM(s) Oral every 6 hours PRN Mild Pain (1 - 3)  dextrose Gel 1 Dose(s) Oral once PRN Blood Glucose LESS THAN 70 milliGRAM(s)/deciliter  glucagon  Injectable 1 milliGRAM(s) IntraMuscular once PRN Glucose LESS THAN 70 milligrams/deciliter      On Beta Blocker? YES     Labs:                        10.0   12.3  )-----------( 253      ( 03 Oct 2017 03:03 )             28.7     10-03    134<L>  |  96  |  67<H>  ----------------------------<  142<H>  4.4   |  20<L>  |  4.44<H>    Ca    8.6      03 Oct 2017 03:03  Phos  6.0     10-03  Mg     2.8     10-03    TPro  6.4  /  Alb  3.7  /  TBili  1.3<H>  /  DBili  x   /  AST  16  /  ALT  41  /  AlkPhos  116  10-03    PT/INR - ( 03 Oct 2017 03:03 )   PT: 13.4 sec;   INR: 1.20          PTT - ( 03 Oct 2017 03:03 )  PTT:45.1 sec    ABO Interpretation: O (10-02-17 @ 06:54)    ABG - ( 03 Oct 2017 03:09 )  pH: 7.48  /  pCO2: 30    /  pO2: 78    / HCO3: 21    / Base Excess: -1.6  /  SaO2: 96                CARDIAC MARKERS ( 01 Oct 2017 17:10 )  x     / 5.16 ng/mL / x     / x     / x              Hgb A1C: 5.5    EKG:   < from: 12 Lead ECG (10.03.17 @ 09:57) >  Diagnosis Line Normal sinus rhythm  ST & T wave abnormality, consider inferolateral ischemia  Prolonged QT  < end of copied text >    CXR:  < from: Xray Chest 1 View AP -PORTABLE-Routine (10.03.17 @ 04:28) >  IMPRESSION:  Worsening left pleural effusion and increased pulmonary vascular   congestion.  < end of copied text >    CT Scans: CT head pending     Cath Report: MidLAD 80%, distal LAD 80%, Diagonal 85%, Prox circ 95%, . left PDA 65%, Prox RCA 70, Mid RCA 85% and Distal PCA 90%    Echo:   < from: Echocardiogram (10.02.17 @ 12:04) >  Normal left ventricular size and wall thickness.The left ventricular ejection fraction is estimated to be 30-35%Probably normal right ventricular size and function.The left atrium is dilated.There is mild aortic valve thickening.No hemodynamically significant valvular aortic stenosis.Mitral annular calcification noted.There is moderate mitral regurgitation.There is mild tricuspid regurgitation.The pulmonary artery systolic pressure is estimated to be 49 mmHg.There is trace pulmonic regurgitation.The IVC is dilated   (>2.1 cm) however with normal inspiratory collapse (>50%) consistent with mildly elevated right atrial pressure (8mmHg, range 5-10mmHg).  There is no pericardial effusion.  < end of copied text >    PFT's:    Carotid Duplex:  < from: US Duplex Carotid Arteries Complete, Bilateral (10.01.17 @ 17:07) >  IMPRESSION:  Small-to-moderate amount of plaque. No evidence of hemodynamically   significant stenosis.  < end of copied text >      Consult in Chart?  YES   Consent in Chart?  NO  Pre-op Orders Placed? YES  Blood Prodeucts Ordered? YES   NPO ordered? YES Planned Date of Surgery:  10/4/17                                                                                                                Surgeon: Dr. Sorenson    Procedure: CABG    HPI:  66 year old male with history of DM, HLD and prostatic cancer s/p radiation therapy in 2010 was brought in by ambulance after a syncopal episode. Patient was having multiple days of diarrhea after taking laxative for constipation. In the ED at [place] patient's labs were significant for elevated troponins without EKG changes, elevated Creatinine and Hyponatremia. The YOANDY and hyponatremia was presumed to be due to gastric losses and both recovered with fluid resuscitation. On 9/29/17 patient underwent cardiac catherization for NSTEMI and was found to have EF of 25% and 3vCAD ( MidLAD 80%, distal LAD 80%, Diagonal 85%, Prox circ 95%, . left PDA 65%, Prox RCA 70, Mid RCA 85% and Distal PCA 90%). Echo demonstrated moderate Tricuspid regurgitation. In light of 3vCAD patient was transferred to [facility] for CABG work up.    PAST MEDICAL & SURGICAL HISTORY:  Prostatic cancer  Diabetes mellitus  HTN (hypertension)  No significant past surgical history      No Known Allergies      MEDICATIONS  (STANDING):  ALBUTerol/ipratropium for Nebulization 3 milliLiter(s) Nebulizer every 6 hours  aspirin enteric coated 81 milliGRAM(s) Oral daily  atorvastatin 40 milliGRAM(s) Oral at bedtime  chlorhexidine 0.12% Liquid 10 milliLiter(s) Swish and Spit once  chlorhexidine 4% Liquid 1 Application(s) Topical once  dextrose 5%. 1000 milliLiter(s) (50 mL/Hr) IV Continuous <Continuous>  dextrose 50% Injectable 12.5 Gram(s) IV Push once  dextrose 50% Injectable 25 Gram(s) IV Push once  dextrose 50% Injectable 25 Gram(s) IV Push once  heparin  Injectable 5000 Unit(s) SubCutaneous every 8 hours  insulin lispro (HumaLOG) corrective regimen sliding scale   SubCutaneous Before meals and at bedtime  metoprolol 12.5 milliGRAM(s) Oral two times a day  pantoprazole    Tablet 40 milliGRAM(s) Oral before breakfast  sodium chloride 0.9% lock flush 3 milliLiter(s) IV Push every 8 hours    MEDICATIONS  (PRN):  acetaminophen   Tablet. 650 milliGRAM(s) Oral every 6 hours PRN Mild Pain (1 - 3)  dextrose Gel 1 Dose(s) Oral once PRN Blood Glucose LESS THAN 70 milliGRAM(s)/deciliter  glucagon  Injectable 1 milliGRAM(s) IntraMuscular once PRN Glucose LESS THAN 70 milligrams/deciliter      On Beta Blocker? YES     Labs:                        10.0   12.3  )-----------( 253      ( 03 Oct 2017 03:03 )             28.7     10-03    134<L>  |  96  |  67<H>  ----------------------------<  142<H>  4.4   |  20<L>  |  4.44<H>    Ca    8.6      03 Oct 2017 03:03  Phos  6.0     10-03  Mg     2.8     10-03    TPro  6.4  /  Alb  3.7  /  TBili  1.3<H>  /  DBili  x   /  AST  16  /  ALT  41  /  AlkPhos  116  10-03    PT/INR - ( 03 Oct 2017 03:03 )   PT: 13.4 sec;   INR: 1.20          PTT - ( 03 Oct 2017 03:03 )  PTT:45.1 sec    ABO Interpretation: O (10-02-17 @ 06:54)    ABG - ( 03 Oct 2017 03:09 )  pH: 7.48  /  pCO2: 30    /  pO2: 78    / HCO3: 21    / Base Excess: -1.6  /  SaO2: 96                CARDIAC MARKERS ( 01 Oct 2017 17:10 )  x     / 5.16 ng/mL / x     / x     / x              Hgb A1C: 5.5    EKG:   < from: 12 Lead ECG (10.03.17 @ 09:57) >  Diagnosis Line Normal sinus rhythm  ST & T wave abnormality, consider inferolateral ischemia  Prolonged QT  < end of copied text >    CXR:  < from: Xray Chest 1 View AP -PORTABLE-Routine (10.03.17 @ 04:28) >  IMPRESSION:  Worsening left pleural effusion and increased pulmonary vascular   congestion.  < end of copied text >    CT Scans: CT head pending     Cath Report: MidLAD 80%, distal LAD 80%, Diagonal 85%, Prox circ 95%, . left PDA 65%, Prox RCA 70, Mid RCA 85% and Distal PCA 90%    Echo:   < from: Echocardiogram (10.02.17 @ 12:04) >  Normal left ventricular size and wall thickness.The left ventricular ejection fraction is estimated to be 30-35%Probably normal right ventricular size and function.The left atrium is dilated.There is mild aortic valve thickening.No hemodynamically significant valvular aortic stenosis.Mitral annular calcification noted.There is moderate mitral regurgitation.There is mild tricuspid regurgitation.The pulmonary artery systolic pressure is estimated to be 49 mmHg.There is trace pulmonic regurgitation.The IVC is dilated   (>2.1 cm) however with normal inspiratory collapse (>50%) consistent with mildly elevated right atrial pressure (8mmHg, range 5-10mmHg).  There is no pericardial effusion.  < end of copied text >    PFT's: FEV1 38%    Carotid Duplex:  < from: US Duplex Carotid Arteries Complete, Bilateral (10.01.17 @ 17:07) >  IMPRESSION:  Small-to-moderate amount of plaque. No evidence of hemodynamically   significant stenosis.  < end of copied text >      Consult in Chart?  YES   Consent in Chart?  NO  Pre-op Orders Placed? YES  Blood Prodeucts Ordered? YES   NPO ordered? YES

## 2017-10-03 NOTE — PROGRESS NOTE ADULT - SUBJECTIVE AND OBJECTIVE BOX
Objective and subjective   Mr Roldan is in his bed in his chair this morning. feels better, Was dialyzed yesterday 2.5 liters removed and chest tube was palced with good drainage.  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 Here over the past 24 hours found to have worsening of the kidney fiunction - oliguria and fluid overload. Started on HD - 10/2 - got  2 session so far         Patient seen and examined at bedside.     acetaminophen   Tablet. 650 milliGRAM(s) every 6 hours PRN  ALBUTerol/ipratropium for Nebulization 3 milliLiter(s) every 6 hours  aspirin enteric coated 81 milliGRAM(s) daily  atorvastatin 40 milliGRAM(s) at bedtime  chlorhexidine 0.12% Liquid 10 milliLiter(s) once  chlorhexidine 4% Liquid 1 Application(s) once  dextrose 5%. 1000 milliLiter(s) <Continuous>  dextrose 50% Injectable 12.5 Gram(s) once  dextrose 50% Injectable 25 Gram(s) once  dextrose 50% Injectable 25 Gram(s) once  dextrose Gel 1 Dose(s) once PRN  glucagon  Injectable 1 milliGRAM(s) once PRN  heparin  Injectable 5000 Unit(s) every 8 hours  insulin lispro (HumaLOG) corrective regimen sliding scale   Before meals and at bedtime  metoprolol 12.5 milliGRAM(s) two times a day  pantoprazole    Tablet 40 milliGRAM(s) before breakfast  sodium chloride 0.9% lock flush 3 milliLiter(s) every 8 hours      Allergies    No Known Allergies    Intolerances        T(C): , Max: 37.2 (10-03-17 @ 01:16)  T(F): , Max: 98.9 (10-03-17 @ 01:16)  HR: 78 (10-03-17 @ 14:00)  BP: 95/67 (10-03-17 @ 11:00)  BP(mean): 75 (10-03-17 @ 11:00)  RR: 14 (10-03-17 @ 14:00)  SpO2: 97% (10-03-17 @ 14:00)  Wt(kg): --    10-02 @ 07:01  -  10-03 @ 07:00  --------------------------------------------------------  IN:    IV PiggyBack: 50 mL    Oral Fluid: 510 mL    Packed Red Blood Cells: 685 mL  Total IN: 1245 mL    OUT:    Chest Tube: 2600 mL    Indwelling Catheter - Urethral: 115 mL    Other: 2500 mL  Total OUT: 5215 mL    Total NET: -3970 mL      10-03 @ 07:01  -  10-03 @ 14:53  --------------------------------------------------------  IN:    Oral Fluid: 120 mL  Total IN: 120 mL    OUT:    Chest Tube: 150 mL    Indwelling Catheter - Urethral: 50 mL  Total OUT: 200 mL    Total NET: -80 mL      Physical exam:   Alert and oriented   HAs a HD catheter on the right IJ   Not in acute respiratory distress on nasal cannula   Normal air entry in the lungs, no wheezing, no crackles, decreased breath sounds on the right base, has a chest tube on the left  RRR, normal s1/s2, no murmurs, rubs or gallops   Abdomen soft, non-tender, normal bowel sounds   Mild peripheral edema - chronic changes over the lower extremities         LABS:                        10.0   12.3  )-----------( 253      ( 03 Oct 2017 03:03 )             28.7     10-03    134<L>  |  96  |  67<H>  ----------------------------<  142<H>  4.4   |  20<L>  |  4.44<H>    Ca    8.6      03 Oct 2017 03:03  Phos  6.0     10-03  Mg     2.8     10-03    TPro  6.4  /  Alb  3.7  /  TBili  1.3<H>  /  DBili  x   /  AST  16  /  ALT  41  /  AlkPhos  116  10-03      PT/INR - ( 03 Oct 2017 03:03 )   PT: 13.4 sec;   INR: 1.20          PTT - ( 03 Oct 2017 03:03 )  PTT:45.1 sec          RADIOLOGY & ADDITIONAL STUDIES:      < from: Xray Chest 1 View AP -PORTABLE-Routine (10.03.17 @ 04:28) >    CLINICAL INFORMATION: 66 years, Male, Follow Up.    PRIOR STUDIES: October 2, 2017    FINDINGS: Heart size is stable. Right internal jugular vein dialysis   catheter is again seen, unchanged. Pigtail catheter at the right lung   base is again seen. There is no pneumothorax. There is a worsening left   pleural effusion. There is increased pulmonary vascular congestion.    IMPRESSION:  Worsening left pleural effusion and increased pulmonary vascular   congestion.      < end of copied text > Objective and subjective   Mr Roldan is in his bed in his chair this morning. feels better, Was dialyzed yesterday 2.5 liters removed and chest tube was palced with good drainage.  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 Here over the past 24 hours found to have worsening of the kidney fiunction - oliguria and fluid overload. Started on HD - 10/2 - got  2 session so far         Patient seen and examined at bedside.     acetaminophen   Tablet. 650 milliGRAM(s) every 6 hours PRN  ALBUTerol/ipratropium for Nebulization 3 milliLiter(s) every 6 hours  aspirin enteric coated 81 milliGRAM(s) daily  atorvastatin 40 milliGRAM(s) at bedtime  chlorhexidine 0.12% Liquid 10 milliLiter(s) once  chlorhexidine 4% Liquid 1 Application(s) once  dextrose 5%. 1000 milliLiter(s) <Continuous>  dextrose 50% Injectable 12.5 Gram(s) once  dextrose 50% Injectable 25 Gram(s) once  dextrose 50% Injectable 25 Gram(s) once  dextrose Gel 1 Dose(s) once PRN  glucagon  Injectable 1 milliGRAM(s) once PRN  heparin  Injectable 5000 Unit(s) every 8 hours  insulin lispro (HumaLOG) corrective regimen sliding scale   Before meals and at bedtime  metoprolol 12.5 milliGRAM(s) two times a day  pantoprazole    Tablet 40 milliGRAM(s) before breakfast  sodium chloride 0.9% lock flush 3 milliLiter(s) every 8 hours      Allergies    No Known Allergies    Intolerances        T(C): , Max: 37.2 (10-03-17 @ 01:16)  T(F): , Max: 98.9 (10-03-17 @ 01:16)  HR: 78 (10-03-17 @ 14:00)  BP: 95/67 (10-03-17 @ 11:00)  BP(mean): 75 (10-03-17 @ 11:00)  RR: 14 (10-03-17 @ 14:00)  SpO2: 97% (10-03-17 @ 14:00)  Wt(kg): --    10-02 @ 07:01  -  10-03 @ 07:00  --------------------------------------------------------  IN:    IV PiggyBack: 50 mL    Oral Fluid: 510 mL    Packed Red Blood Cells: 685 mL  Total IN: 1245 mL    OUT:    Chest Tube: 2600 mL    Indwelling Catheter - Urethral: 115 mL    Other: 2500 mL  Total OUT: 5215 mL    Total NET: -3970 mL      10-03 @ 07:01  -  10-03 @ 14:53  --------------------------------------------------------  IN:    Oral Fluid: 120 mL  Total IN: 120 mL    OUT:    Chest Tube: 150 mL    Indwelling Catheter - Urethral: 50 mL  Total OUT: 200 mL    Total NET: -80 mL      Physical exam:   Alert and oriented   HAs a HD catheter on the right IJ   Not in acute respiratory distress on nasal cannula   Normal air entry in the lungs, no wheezing, no crackles, decreased breath sounds on the right base, has a chest tube on the left  RRR, normal s1/s2, no murmurs, rubs or gallops   Abdomen soft, non-tender, normal bowel sounds   Mild peripheral edema - chronic changes over the lower extremities         LABS:                        10.0   12.3  )-----------( 253      ( 03 Oct 2017 03:03 )             28.7     10-03    134<L>  |  96  |  67<H>  ----------------------------<  142<H>  4.4   |  20<L>  |  4.44<H>    Ca    8.6      03 Oct 2017 03:03  Phos  6.0     10-03  Mg     2.8     10-03    TPro  6.4  /  Alb  3.7  /  TBili  1.3<H>  /  DBili  x   /  AST  16  /  ALT  41  /  AlkPhos  116  10-03      PT/INR - ( 03 Oct 2017 03:03 )   PT: 13.4 sec;   INR: 1.20          PTT - ( 03 Oct 2017 03:03 )  PTT:45.1 sec          RADIOLOGY & ADDITIONAL STUDIES:      < from: Xray Chest 1 View AP -PORTABLE-Routine (10.03.17 @ 04:28) >    CLINICAL INFORMATION: 66 years, Male, Follow Up.    PRIOR STUDIES: October 2, 2017    FINDINGS: Heart size is stable. Right internal jugular vein dialysis   catheter is again seen, unchanged. Pigtail catheter at the right lung   base is again seen. There is no pneumothorax. There is a worsening left   pleural effusion. There is increased pulmonary vascular congestion.    IMPRESSION:  Worsening left pleural effusion and increased pulmonary vascular   congestion.      < end of copied text >

## 2017-10-03 NOTE — PROGRESS NOTE ADULT - PROBLEM SELECTOR PLAN 1
- most likely due to cardiorenal syndrome and/or contrast induced   - was started on HD on 10/2 - two sessions   - has a HD catheter on the right IJ   - still oliguric this morning - 115 ml   - u/s of the bladder and kidneys - noted - no hydronephrosis   - please him on renal diet   - we will do extra session of HD today for fluid removal   - please limit the fluid intake (the patient instructed for that also)  - follow in and outs  - electrolytes acceptable K 4.4   - calcium 8.6

## 2017-10-03 NOTE — PROGRESS NOTE ADULT - ASSESSMENT
"Chief Complaint   Patient presents with     Prenatal Care       Initial /74 (BP Location: Right arm, Patient Position: Sitting, Cuff Size: Adult Regular)  Pulse 72  Ht 5' 3\" (1.6 m)  Wt 197 lb (89.4 kg)  LMP 02/10/2017 (Exact Date)  Breastfeeding? No  BMI 34.9 kg/m2 Estimated body mass index is 34.9 kg/(m^2) as calculated from the following:    Height as of this encounter: 5' 3\" (1.6 m).    Weight as of this encounter: 197 lb (89.4 kg).  Medication Reconciliation: complete     31w4d  + FM daily  - cramping, bleeding or contractions  + leg cramps  - leaking of fluid  - headache or vision changes  Sharonda Au LPN      " This is 66 year old male with PMH stated above. Now in th CT ICU - for possible CABG. Started on HD last night for oliguric YOANDY (could be due to the cardiorenal syndrom and/or Contrast induced nephroapthy). He was  started on HD on 10/2 two session so far. Today still oliguria - 115 ml. So we will do HD today

## 2017-10-03 NOTE — PROGRESS NOTE ADULT - SUBJECTIVE AND OBJECTIVE BOX
Patient was seen and evaluated on dialysis.   Patient is tolerating the procedure well.   HR: 84 (10-03-17 @ 19:00)  BP: 95/67 (10-03-17 @ 11:00) pusle 65, /67  Continue dialysis:   Dialyzer:  180   QB: 300        QD: 500  Goal UF 2 liters  over 3 Hours       The patient seen during HD from me - hemodynamically stable

## 2017-10-04 ENCOUNTER — APPOINTMENT (OUTPATIENT)
Dept: CARDIOTHORACIC SURGERY | Facility: HOSPITAL | Age: 67
End: 2017-10-04
Payer: COMMERCIAL

## 2017-10-04 LAB
ALBUMIN SERPL ELPH-MCNC: 3.2 G/DL — LOW (ref 3.3–5)
ALBUMIN SERPL ELPH-MCNC: 3.3 G/DL — SIGNIFICANT CHANGE UP (ref 3.3–5)
ALP SERPL-CCNC: 116 U/L — SIGNIFICANT CHANGE UP (ref 40–120)
ALP SERPL-CCNC: 80 U/L — SIGNIFICANT CHANGE UP (ref 40–120)
ALT FLD-CCNC: 18 U/L — SIGNIFICANT CHANGE UP (ref 10–45)
ALT FLD-CCNC: 32 U/L — SIGNIFICANT CHANGE UP (ref 10–45)
ANION GAP SERPL CALC-SCNC: 14 MMOL/L — SIGNIFICANT CHANGE UP (ref 5–17)
ANION GAP SERPL CALC-SCNC: 15 MMOL/L — SIGNIFICANT CHANGE UP (ref 5–17)
ANION GAP SERPL CALC-SCNC: 15 MMOL/L — SIGNIFICANT CHANGE UP (ref 5–17)
ANION GAP SERPL CALC-SCNC: 16 MMOL/L — SIGNIFICANT CHANGE UP (ref 5–17)
APTT BLD: 36 SEC — SIGNIFICANT CHANGE UP (ref 27.5–37.4)
APTT BLD: 38.2 SEC — HIGH (ref 27.5–37.4)
APTT BLD: 39.6 SEC — HIGH (ref 27.5–37.4)
APTT BLD: 73.3 SEC — HIGH (ref 27.5–37.4)
AST SERPL-CCNC: 15 U/L — SIGNIFICANT CHANGE UP (ref 10–40)
AST SERPL-CCNC: 15 U/L — SIGNIFICANT CHANGE UP (ref 10–40)
BASE EXCESS BLDA CALC-SCNC: -0.9 MMOL/L — SIGNIFICANT CHANGE UP (ref -2–3)
BASE EXCESS BLDA CALC-SCNC: -2.3 MMOL/L — LOW (ref -2–3)
BASE EXCESS BLDA CALC-SCNC: -2.6 MMOL/L — LOW (ref -2–3)
BASE EXCESS BLDA CALC-SCNC: -3.4 MMOL/L — LOW (ref -2–3)
BILIRUB SERPL-MCNC: 0.8 MG/DL — SIGNIFICANT CHANGE UP (ref 0.2–1.2)
BILIRUB SERPL-MCNC: 0.8 MG/DL — SIGNIFICANT CHANGE UP (ref 0.2–1.2)
BUN SERPL-MCNC: 46 MG/DL — HIGH (ref 7–23)
BUN SERPL-MCNC: 49 MG/DL — HIGH (ref 7–23)
BUN SERPL-MCNC: 50 MG/DL — HIGH (ref 7–23)
BUN SERPL-MCNC: 54 MG/DL — HIGH (ref 7–23)
CA-I BLDA-SCNC: 1.06 MMOL/L — LOW (ref 1.12–1.3)
CALCIUM SERPL-MCNC: 7.6 MG/DL — LOW (ref 8.4–10.5)
CALCIUM SERPL-MCNC: 8.3 MG/DL — LOW (ref 8.4–10.5)
CALCIUM SERPL-MCNC: 8.3 MG/DL — LOW (ref 8.4–10.5)
CALCIUM SERPL-MCNC: 8.5 MG/DL — SIGNIFICANT CHANGE UP (ref 8.4–10.5)
CHLORIDE SERPL-SCNC: 101 MMOL/L — SIGNIFICANT CHANGE UP (ref 96–108)
CHLORIDE SERPL-SCNC: 102 MMOL/L — SIGNIFICANT CHANGE UP (ref 96–108)
CHLORIDE SERPL-SCNC: 102 MMOL/L — SIGNIFICANT CHANGE UP (ref 96–108)
CHLORIDE SERPL-SCNC: 95 MMOL/L — LOW (ref 96–108)
CO2 SERPL-SCNC: 20 MMOL/L — LOW (ref 22–31)
CO2 SERPL-SCNC: 20 MMOL/L — LOW (ref 22–31)
CO2 SERPL-SCNC: 21 MMOL/L — LOW (ref 22–31)
CO2 SERPL-SCNC: 23 MMOL/L — SIGNIFICANT CHANGE UP (ref 22–31)
COHGB MFR BLDA: 0.7 % — SIGNIFICANT CHANGE UP
CREAT SERPL-MCNC: 3.58 MG/DL — HIGH (ref 0.5–1.3)
CREAT SERPL-MCNC: 3.59 MG/DL — HIGH (ref 0.5–1.3)
CREAT SERPL-MCNC: 3.64 MG/DL — HIGH (ref 0.5–1.3)
CREAT SERPL-MCNC: 3.68 MG/DL — HIGH (ref 0.5–1.3)
GAS PNL BLDA: SIGNIFICANT CHANGE UP
GLUCOSE SERPL-MCNC: 120 MG/DL — HIGH (ref 70–99)
GLUCOSE SERPL-MCNC: 145 MG/DL — HIGH (ref 70–99)
GLUCOSE SERPL-MCNC: 152 MG/DL — HIGH (ref 70–99)
GLUCOSE SERPL-MCNC: 204 MG/DL — HIGH (ref 70–99)
HCO3 BLDA-SCNC: 20 MMOL/L — LOW (ref 21–28)
HCO3 BLDA-SCNC: 21 MMOL/L — SIGNIFICANT CHANGE UP (ref 21–28)
HCO3 BLDA-SCNC: 21 MMOL/L — SIGNIFICANT CHANGE UP (ref 21–28)
HCO3 BLDA-SCNC: 23 MMOL/L — SIGNIFICANT CHANGE UP (ref 21–28)
HCT VFR BLD CALC: 27.7 % — LOW (ref 39–50)
HCT VFR BLD CALC: 28.2 % — LOW (ref 39–50)
HCT VFR BLD CALC: 28.6 % — LOW (ref 39–50)
HCT VFR BLD CALC: 28.9 % — LOW (ref 39–50)
HGB BLD-MCNC: 10.4 G/DL — LOW (ref 13–17)
HGB BLD-MCNC: 9.5 G/DL — LOW (ref 13–17)
HGB BLD-MCNC: 9.8 G/DL — LOW (ref 13–17)
HGB BLD-MCNC: 9.9 G/DL — LOW (ref 13–17)
HGB BLDA-MCNC: 11.1 G/DL — LOW (ref 13–17)
INR BLD: 1.22 — HIGH (ref 0.88–1.16)
INR BLD: 1.24 — HIGH (ref 0.88–1.16)
INR BLD: 1.29 — HIGH (ref 0.88–1.16)
INR BLD: 1.33 — HIGH (ref 0.88–1.16)
LACTATE SERPL-SCNC: 0.9 MMOL/L — SIGNIFICANT CHANGE UP (ref 0.5–2)
LACTATE SERPL-SCNC: 1.5 MMOL/L — SIGNIFICANT CHANGE UP (ref 0.5–2)
LACTATE SERPL-SCNC: 1.8 MMOL/L — SIGNIFICANT CHANGE UP (ref 0.5–2)
LYMPHOCYTES # BLD AUTO: 6 % — LOW (ref 13–44)
MAGNESIUM SERPL-MCNC: 2.4 MG/DL — SIGNIFICANT CHANGE UP (ref 1.6–2.6)
MAGNESIUM SERPL-MCNC: 2.6 MG/DL — SIGNIFICANT CHANGE UP (ref 1.6–2.6)
MAGNESIUM SERPL-MCNC: 2.6 MG/DL — SIGNIFICANT CHANGE UP (ref 1.6–2.6)
MCHC RBC-ENTMCNC: 30.7 PG — SIGNIFICANT CHANGE UP (ref 27–34)
MCHC RBC-ENTMCNC: 31 PG — SIGNIFICANT CHANGE UP (ref 27–34)
MCHC RBC-ENTMCNC: 31.5 PG — SIGNIFICANT CHANGE UP (ref 27–34)
MCHC RBC-ENTMCNC: 32.2 PG — SIGNIFICANT CHANGE UP (ref 27–34)
MCHC RBC-ENTMCNC: 33.9 G/DL — SIGNIFICANT CHANGE UP (ref 32–36)
MCHC RBC-ENTMCNC: 34.3 G/DL — SIGNIFICANT CHANGE UP (ref 32–36)
MCHC RBC-ENTMCNC: 35.1 G/DL — SIGNIFICANT CHANGE UP (ref 32–36)
MCHC RBC-ENTMCNC: 36.4 G/DL — HIGH (ref 32–36)
MCV RBC AUTO: 88.5 FL — SIGNIFICANT CHANGE UP (ref 80–100)
MCV RBC AUTO: 89.8 FL — SIGNIFICANT CHANGE UP (ref 80–100)
MCV RBC AUTO: 90.5 FL — SIGNIFICANT CHANGE UP (ref 80–100)
MCV RBC AUTO: 90.6 FL — SIGNIFICANT CHANGE UP (ref 80–100)
METHGB MFR BLDA: 0.5 % — SIGNIFICANT CHANGE UP
MONOCYTES NFR BLD AUTO: 7 % — SIGNIFICANT CHANGE UP (ref 2–14)
NEUTROPHILS NFR BLD AUTO: 83 % — HIGH (ref 43–77)
O2 CT VFR BLDA CALC: 16.2 ML/DL — SIGNIFICANT CHANGE UP (ref 15–23)
OXYHGB MFR BLDA: 98 % — SIGNIFICANT CHANGE UP (ref 94–100)
PCO2 BLDA: 29 MMHG — LOW (ref 35–48)
PCO2 BLDA: 31 MMHG — LOW (ref 35–48)
PCO2 BLDA: 34 MMHG — LOW (ref 35–48)
PCO2 BLDA: 35 MMHG — SIGNIFICANT CHANGE UP (ref 35–48)
PH BLDA: 7.4 — SIGNIFICANT CHANGE UP (ref 7.35–7.45)
PH BLDA: 7.44 — SIGNIFICANT CHANGE UP (ref 7.35–7.45)
PH BLDA: 7.45 — SIGNIFICANT CHANGE UP (ref 7.35–7.45)
PH BLDA: 7.46 — HIGH (ref 7.35–7.45)
PHOSPHATE SERPL-MCNC: 4.6 MG/DL — HIGH (ref 2.5–4.5)
PLATELET # BLD AUTO: 209 K/UL — SIGNIFICANT CHANGE UP (ref 150–400)
PLATELET # BLD AUTO: 210 K/UL — SIGNIFICANT CHANGE UP (ref 150–400)
PLATELET # BLD AUTO: 222 K/UL — SIGNIFICANT CHANGE UP (ref 150–400)
PLATELET # BLD AUTO: 242 K/UL — SIGNIFICANT CHANGE UP (ref 150–400)
PO2 BLDA: 182 MMHG — HIGH (ref 83–108)
PO2 BLDA: 217 MMHG — HIGH (ref 83–108)
PO2 BLDA: 317 MMHG — HIGH (ref 83–108)
PO2 BLDA: 90 MMHG — SIGNIFICANT CHANGE UP (ref 83–108)
POTASSIUM BLDA-SCNC: 4 MMOL/L — SIGNIFICANT CHANGE UP (ref 3.5–4.9)
POTASSIUM SERPL-MCNC: 4 MMOL/L — SIGNIFICANT CHANGE UP (ref 3.5–5.3)
POTASSIUM SERPL-MCNC: 4.1 MMOL/L — SIGNIFICANT CHANGE UP (ref 3.5–5.3)
POTASSIUM SERPL-MCNC: 4.4 MMOL/L — SIGNIFICANT CHANGE UP (ref 3.5–5.3)
POTASSIUM SERPL-MCNC: 4.7 MMOL/L — SIGNIFICANT CHANGE UP (ref 3.5–5.3)
POTASSIUM SERPL-SCNC: 4 MMOL/L — SIGNIFICANT CHANGE UP (ref 3.5–5.3)
POTASSIUM SERPL-SCNC: 4.1 MMOL/L — SIGNIFICANT CHANGE UP (ref 3.5–5.3)
POTASSIUM SERPL-SCNC: 4.4 MMOL/L — SIGNIFICANT CHANGE UP (ref 3.5–5.3)
POTASSIUM SERPL-SCNC: 4.7 MMOL/L — SIGNIFICANT CHANGE UP (ref 3.5–5.3)
PROT SERPL-MCNC: 5.6 G/DL — LOW (ref 6–8.3)
PROT SERPL-MCNC: 6.3 G/DL — SIGNIFICANT CHANGE UP (ref 6–8.3)
PROTHROM AB SERPL-ACNC: 13.6 SEC — HIGH (ref 9.8–12.7)
PROTHROM AB SERPL-ACNC: 13.8 SEC — HIGH (ref 9.8–12.7)
PROTHROM AB SERPL-ACNC: 14.4 SEC — HIGH (ref 9.8–12.7)
PROTHROM AB SERPL-ACNC: 14.8 SEC — HIGH (ref 9.8–12.7)
RBC # BLD: 3.06 M/UL — LOW (ref 4.2–5.8)
RBC # BLD: 3.14 M/UL — LOW (ref 4.2–5.8)
RBC # BLD: 3.19 M/UL — LOW (ref 4.2–5.8)
RBC # BLD: 3.23 M/UL — LOW (ref 4.2–5.8)
RBC # FLD: 14.3 % — SIGNIFICANT CHANGE UP (ref 10.3–16.9)
RBC # FLD: 14.7 % — SIGNIFICANT CHANGE UP (ref 10.3–16.9)
RBC # FLD: 14.7 % — SIGNIFICANT CHANGE UP (ref 10.3–16.9)
RBC # FLD: 14.8 % — SIGNIFICANT CHANGE UP (ref 10.3–16.9)
SAO2 % BLDA: 100 % — SIGNIFICANT CHANGE UP (ref 95–100)
SAO2 % BLDA: 96 % — SIGNIFICANT CHANGE UP (ref 95–100)
SAO2 % BLDA: 99 % — SIGNIFICANT CHANGE UP (ref 95–100)
SAO2 % BLDA: 99 % — SIGNIFICANT CHANGE UP (ref 95–100)
SODIUM BLDA-SCNC: 135 MMOL/L — LOW (ref 138–146)
SODIUM SERPL-SCNC: 134 MMOL/L — LOW (ref 135–145)
SODIUM SERPL-SCNC: 136 MMOL/L — SIGNIFICANT CHANGE UP (ref 135–145)
SODIUM SERPL-SCNC: 137 MMOL/L — SIGNIFICANT CHANGE UP (ref 135–145)
SODIUM SERPL-SCNC: 137 MMOL/L — SIGNIFICANT CHANGE UP (ref 135–145)
WBC # BLD: 11.7 K/UL — HIGH (ref 3.8–10.5)
WBC # BLD: 21.9 K/UL — HIGH (ref 3.8–10.5)
WBC # BLD: 23 K/UL — HIGH (ref 3.8–10.5)
WBC # BLD: 33.1 K/UL — HIGH (ref 3.8–10.5)
WBC # FLD AUTO: 11.7 K/UL — HIGH (ref 3.8–10.5)
WBC # FLD AUTO: 21.9 K/UL — HIGH (ref 3.8–10.5)
WBC # FLD AUTO: 23 K/UL — HIGH (ref 3.8–10.5)
WBC # FLD AUTO: 33.1 K/UL — HIGH (ref 3.8–10.5)

## 2017-10-04 PROCEDURE — 33518 CABG ARTERY-VEIN TWO: CPT | Mod: 80

## 2017-10-04 PROCEDURE — 99291 CRITICAL CARE FIRST HOUR: CPT

## 2017-10-04 PROCEDURE — 93010 ELECTROCARDIOGRAM REPORT: CPT

## 2017-10-04 PROCEDURE — 33533 CABG ARTERIAL SINGLE: CPT | Mod: AS

## 2017-10-04 PROCEDURE — 76998 US GUIDE INTRAOP: CPT | Mod: 26,59

## 2017-10-04 PROCEDURE — 33533 CABG ARTERIAL SINGLE: CPT

## 2017-10-04 PROCEDURE — 99232 SBSQ HOSP IP/OBS MODERATE 35: CPT | Mod: GC

## 2017-10-04 PROCEDURE — 33518 CABG ARTERY-VEIN TWO: CPT

## 2017-10-04 PROCEDURE — 33508 ENDOSCOPIC VEIN HARVEST: CPT | Mod: AS

## 2017-10-04 PROCEDURE — 33533 CABG ARTERIAL SINGLE: CPT | Mod: 80

## 2017-10-04 PROCEDURE — 71010: CPT | Mod: 26

## 2017-10-04 PROCEDURE — 33518 CABG ARTERY-VEIN TWO: CPT | Mod: AS

## 2017-10-04 RX ORDER — SODIUM CHLORIDE 9 MG/ML
1000 INJECTION, SOLUTION INTRAVENOUS
Qty: 0 | Refills: 0 | Status: DISCONTINUED | OUTPATIENT
Start: 2017-10-04 | End: 2017-10-13

## 2017-10-04 RX ORDER — DEXMEDETOMIDINE HYDROCHLORIDE IN 0.9% SODIUM CHLORIDE 4 UG/ML
0.2 INJECTION INTRAVENOUS
Qty: 200 | Refills: 0 | Status: DISCONTINUED | OUTPATIENT
Start: 2017-10-04 | End: 2017-10-05

## 2017-10-04 RX ORDER — CEFAZOLIN SODIUM 1 G
2000 VIAL (EA) INJECTION EVERY 8 HOURS
Qty: 0 | Refills: 0 | Status: DISCONTINUED | OUTPATIENT
Start: 2017-10-04 | End: 2017-10-05

## 2017-10-04 RX ORDER — MEPERIDINE HYDROCHLORIDE 50 MG/ML
25 INJECTION INTRAMUSCULAR; INTRAVENOUS; SUBCUTANEOUS ONCE
Qty: 0 | Refills: 0 | Status: DISCONTINUED | OUTPATIENT
Start: 2017-10-04 | End: 2017-10-05

## 2017-10-04 RX ORDER — ALBUMIN HUMAN 25 %
250 VIAL (ML) INTRAVENOUS ONCE
Qty: 0 | Refills: 0 | Status: COMPLETED | OUTPATIENT
Start: 2017-10-04 | End: 2017-10-04

## 2017-10-04 RX ORDER — FAMOTIDINE 10 MG/ML
20 INJECTION INTRAVENOUS EVERY 12 HOURS
Qty: 0 | Refills: 0 | Status: DISCONTINUED | OUTPATIENT
Start: 2017-10-04 | End: 2017-10-04

## 2017-10-04 RX ORDER — CLOPIDOGREL BISULFATE 75 MG/1
75 TABLET, FILM COATED ORAL DAILY
Qty: 0 | Refills: 0 | Status: DISCONTINUED | OUTPATIENT
Start: 2017-10-04 | End: 2017-10-13

## 2017-10-04 RX ORDER — ACETAMINOPHEN 500 MG
1000 TABLET ORAL ONCE
Qty: 0 | Refills: 0 | Status: COMPLETED | OUTPATIENT
Start: 2017-10-04 | End: 2017-10-04

## 2017-10-04 RX ORDER — INSULIN HUMAN 100 [IU]/ML
1 INJECTION, SOLUTION SUBCUTANEOUS
Qty: 250 | Refills: 0 | Status: DISCONTINUED | OUTPATIENT
Start: 2017-10-04 | End: 2017-10-05

## 2017-10-04 RX ORDER — ATORVASTATIN CALCIUM 80 MG/1
40 TABLET, FILM COATED ORAL AT BEDTIME
Qty: 0 | Refills: 0 | Status: DISCONTINUED | OUTPATIENT
Start: 2017-10-04 | End: 2017-10-13

## 2017-10-04 RX ORDER — POTASSIUM CHLORIDE 20 MEQ
10 PACKET (EA) ORAL
Qty: 0 | Refills: 0 | Status: DISCONTINUED | OUTPATIENT
Start: 2017-10-04 | End: 2017-10-04

## 2017-10-04 RX ORDER — POTASSIUM CHLORIDE 20 MEQ
10 PACKET (EA) ORAL ONCE
Qty: 0 | Refills: 0 | Status: DISCONTINUED | OUTPATIENT
Start: 2017-10-04 | End: 2017-10-04

## 2017-10-04 RX ORDER — FAMOTIDINE 10 MG/ML
20 INJECTION INTRAVENOUS DAILY
Qty: 0 | Refills: 0 | Status: DISCONTINUED | OUTPATIENT
Start: 2017-10-04 | End: 2017-10-05

## 2017-10-04 RX ORDER — CALCIUM GLUCONATE 100 MG/ML
2 VIAL (ML) INTRAVENOUS ONCE
Qty: 0 | Refills: 0 | Status: COMPLETED | OUTPATIENT
Start: 2017-10-04 | End: 2017-10-04

## 2017-10-04 RX ORDER — PROPOFOL 10 MG/ML
20 INJECTION, EMULSION INTRAVENOUS ONCE
Qty: 0 | Refills: 0 | Status: COMPLETED | OUTPATIENT
Start: 2017-10-04 | End: 2017-10-04

## 2017-10-04 RX ORDER — VASOPRESSIN 20 [USP'U]/ML
0.03 INJECTION INTRAVENOUS
Qty: 100 | Refills: 0 | Status: DISCONTINUED | OUTPATIENT
Start: 2017-10-04 | End: 2017-10-05

## 2017-10-04 RX ORDER — MORPHINE SULFATE 50 MG/1
2 CAPSULE, EXTENDED RELEASE ORAL ONCE
Qty: 0 | Refills: 0 | Status: DISCONTINUED | OUTPATIENT
Start: 2017-10-04 | End: 2017-10-04

## 2017-10-04 RX ORDER — NOREPINEPHRINE BITARTRATE/D5W 8 MG/250ML
0.01 PLASTIC BAG, INJECTION (ML) INTRAVENOUS
Qty: 8 | Refills: 0 | Status: DISCONTINUED | OUTPATIENT
Start: 2017-10-04 | End: 2017-10-07

## 2017-10-04 RX ORDER — FENTANYL CITRATE 50 UG/ML
12.5 INJECTION INTRAVENOUS ONCE
Qty: 0 | Refills: 0 | Status: DISCONTINUED | OUTPATIENT
Start: 2017-10-04 | End: 2017-10-04

## 2017-10-04 RX ADMIN — HEPARIN SODIUM 5000 UNIT(S): 5000 INJECTION INTRAVENOUS; SUBCUTANEOUS at 06:08

## 2017-10-04 RX ADMIN — Medication 12.5 MILLIGRAM(S): at 06:09

## 2017-10-04 RX ADMIN — Medication 3 MILLILITER(S): at 06:25

## 2017-10-04 RX ADMIN — Medication 125 MILLILITER(S): at 17:05

## 2017-10-04 RX ADMIN — CHLORHEXIDINE GLUCONATE 10 MILLILITER(S): 213 SOLUTION TOPICAL at 06:52

## 2017-10-04 RX ADMIN — Medication 125 MILLILITER(S): at 14:00

## 2017-10-04 RX ADMIN — SODIUM CHLORIDE 3 MILLILITER(S): 9 INJECTION INTRAMUSCULAR; INTRAVENOUS; SUBCUTANEOUS at 06:48

## 2017-10-04 RX ADMIN — FENTANYL CITRATE 12.5 MICROGRAM(S): 50 INJECTION INTRAVENOUS at 15:15

## 2017-10-04 RX ADMIN — PROPOFOL 20 MILLIGRAM(S): 10 INJECTION, EMULSION INTRAVENOUS at 16:30

## 2017-10-04 RX ADMIN — Medication 2: at 06:47

## 2017-10-04 RX ADMIN — FENTANYL CITRATE 12.5 MICROGRAM(S): 50 INJECTION INTRAVENOUS at 15:45

## 2017-10-04 RX ADMIN — Medication 400 GRAM(S): at 17:04

## 2017-10-04 RX ADMIN — PANTOPRAZOLE SODIUM 40 MILLIGRAM(S): 20 TABLET, DELAYED RELEASE ORAL at 06:08

## 2017-10-04 RX ADMIN — Medication 400 MILLIGRAM(S): at 20:48

## 2017-10-04 RX ADMIN — Medication 1.53 MICROGRAM(S)/KG/MIN: at 15:16

## 2017-10-04 RX ADMIN — MORPHINE SULFATE 2 MILLIGRAM(S): 50 CAPSULE, EXTENDED RELEASE ORAL at 20:10

## 2017-10-04 RX ADMIN — MORPHINE SULFATE 2 MILLIGRAM(S): 50 CAPSULE, EXTENDED RELEASE ORAL at 19:40

## 2017-10-04 RX ADMIN — Medication 100 MILLIGRAM(S): at 17:04

## 2017-10-04 RX ADMIN — Medication 1000 MILLIGRAM(S): at 21:26

## 2017-10-04 NOTE — DIETITIAN INITIAL EVALUATION ADULT. - OTHER INFO
65y/o M s/p OPCAB x3. NPO and intubated. Per progress note, plan to wean to extubate. If pt remains intubated, consider EN initiation. If able to extubate perform dysphagia screen vs formal S&S prior to po. Will follow.

## 2017-10-04 NOTE — PROGRESS NOTE ADULT - SUBJECTIVE AND OBJECTIVE BOX
INTERVAL HPI/OVERNIGHT EVENTS:    Op Day: OPCAB x 3  EF 25-30%  Mild-Mod MR    67yo male Hx DM, chol. prostate Ca - XRT '10 presenting with near syncope  reported multiple days of diarrhea after taking laxatives for constipation - on assessment - elevated Cr/hyponatremia/elevated trop but no ischemic changes on EKG - (+)NSTEMI  IVF resuscitation with improvement  reported recent admit for hypoglycemia and recent course Abx for "viral syndrome" per PMD    Cath 9/29: EF 25% and 3vCAD ( MidLAD 80%, distal LAD diagonal 85%, Prox circ 95%, . left PDA 65%, Prox RCA 70, Mid RCA 85% and Distal PCA 90%)  ECHO: Mod TR    Referred for CTS evaluation  transferred to ICU for RRT - RIJ Shiley placed 10/1  kiatlyn placed ; right pigtail placed for effusion - 2L out  serial HD sessions performed    To OR today - OPCAB x 3 -   EF 25-30% - reported improvement in MR - now mild-moderate MR  no infusions/blood products given intraop    on arrival to ICU - hypotensive - SBP 70's - 1 U pRBC started/1 albumin and required initiation of levophed to maintain MAP 60    PMHx includes but is not limited to:  Prostatic cancer  DM  HTN    ICU Vital Signs Last 24 Hrs  T(C): 36.2 (04 Oct 2017 13:45), Max: 36.8 (03 Oct 2017 21:02)  T(F): 97.2 (04 Oct 2017 13:45), Max: 98.3 (03 Oct 2017 21:02)  HR: 80 (04 Oct 2017 14:00) (72 - 86) sinus; pacer VVI 40  BP: 104/75 (03 Oct 2017 21:00) (104/75 - 104/75)  BP(mean): 83 (03 Oct 2017 21:00) (83 - 83)  ABP: 125/64 (04 Oct 2017 14:00) (100/50 - 126/66)  ABP(mean): 82 (04 Oct 2017 14:00) (68 - 86)  SpO2: 99% (04 Oct 2017 14:00) (95% - 100%) - vent 60%/PEEP 7    Qtts: levophed 0.02    Physical Exam    Heart - regular (-)rub/gallop  Lungs - CTA anterior - bilateral breath sounds appreciated; no wheeze/rhonchi  Abd - (+)BS soft nontender/nondistended; no appreciable r/r/g  Ext - cool to touch; 2+ peripheral palpable pulses. no cyanosis/clubbing/edema  Chest - Op bandage in place; bilateral pleural tubes; mediastinal tubes x 2  Neuro - pupils reactive bilaterally; otherwise unable  Skin - no rash    LABS:                        10.4   11.7  )-----------( 222      ( 04 Oct 2017 03:40 )             28.6     10-04    134<L>  |  95<L>  |  46<H>  ----------------------------<  204<H>  4.1   |  23  |  3.64<H>    Ca    8.5      04 Oct 2017 03:40  Phos  4.6     10-04  Mg     2.6     10-04    TPro  6.3  /  Alb  3.3  /  TBili  0.8  /  DBili  x   /  AST  15  /  ALT  32  /  AlkPhos  116  10-04    PT/INR - ( 04 Oct 2017 03:40 )   PT: 13.6 sec;   INR: 1.22     PTT - ( 04 Oct 2017 03:40 )  PTT:39.6 sec    ABG - ( 04 Oct 2017 13:54 ) 7.4/35/90/96    RADIOLOGY & ADDITIONAL STUDIES: reviewed    Patient with recent near-syncope event with workup revealing NSTEMI - new onset YOANDY/ARF on RRT - now Op Day - OPCAB x 3 with post op shock - suspect vasodilatation.  now improved    1. CV  volume resuscitation with colloid - given 1 U pRBC and 1 albumin on arrival to OR  on levophed 0.01-0.02 to maintain MAP 60  if BP dec again - plan start vasopressin for vasodilatation  plan ASA/Statin  ongoing periop Abx prophylaxis  MR improved post-op ; now mild to moderate  LA 1.3    2. Pulm   serial ABG to optimize oxygenation/ventilation on mech vent  once stabilized - plan wean to extubate   monitor Chest tube output    3. Endocrine  maintain glycemic control  insulin infusion per protocol    4. Renal   ARF requiring RRT - new on this admit  d/w renal attending - may require session today  monitor lytes - patient still with minimal urine output noted despite RRT     DVT and GI prophylaxis (renally adjusted)  d/w patient/staff and CTS; family updated by OR team    I have spent/provided stated minutes of critical care time to this patient: 60 min

## 2017-10-04 NOTE — PROGRESS NOTE ADULT - ASSESSMENT
This is 66 year old male with PMH stated above. Now in th CT ICU - for possible CABG. Started on HD last night for oliguric YOANDY (could be due to the cardiorenal syndrom and/or Contrast induced nephroapthy). He was  started on HD on 10/2 two session so far. Today still oliguria - 225. Underwent surgical intervention earlier today - s/p CABG. hemodynamically stable, no need of HD for today

## 2017-10-04 NOTE — PROGRESS NOTE ADULT - PROBLEM SELECTOR PLAN 1
- most likely due to cardiorenal syndrome and/or contrast induced   - was started on HD on 10/2 - two sessions   - has a HD catheter on the right IJ   - still oliguric this morning - 225 in the past 24 hours    - u/s of the bladder and kidneys - noted - no hydronephrosis   - please him on renal diet   - follow in and outs  - electrolytes acceptable K 4.0  - calcium 7.6

## 2017-10-04 NOTE — PROGRESS NOTE ADULT - SUBJECTIVE AND OBJECTIVE BOX
Objective and subjective   Mr Roldan is seen in the afternoon, earlier today underwent CABG, Intubated and sedated. 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 Here over the past 24 hours found to have worsening of the kidney fiunction - oliguria and fluid overload. Started on HD - 10/2 - got  2 session so far               Patient seen and examined at bedside.     aspirin enteric coated 81 milliGRAM(s) daily  ceFAZolin   IVPB 2000 milliGRAM(s) every 8 hours  dexmedetomidine Infusion 0.2 MICROgram(s)/kG/Hr <Continuous>  famotidine Injectable 20 milliGRAM(s) daily  heparin  Injectable 5000 Unit(s) every 8 hours  insulin Infusion 1 Unit(s)/Hr <Continuous>  meperidine     Injectable 25 milliGRAM(s) once PRN  norepinephrine Infusion 0.01 MICROgram(s)/kG/Min <Continuous>  sodium chloride 0.45%. 1000 milliLiter(s) <Continuous>  vasopressin Infusion 0.033 Unit(s)/Min <Continuous>      Allergies    No Known Allergies    Intolerances        T(C): , Max: 36.8 (10-03-17 @ 21:02)  T(F): , Max: 98.3 (10-03-17 @ 21:02)  HR: 76 (10-04-17 @ 17:00)  BP: 79/59 (10-04-17 @ 14:15)  BP(mean): 64 (10-04-17 @ 14:15)  RR: 16 (10-04-17 @ 17:00)  SpO2: 100% (10-04-17 @ 17:00)  Wt(kg): --    10-03 @ 07:01  -  10-04 @ 07:00  --------------------------------------------------------  IN:    Oral Fluid: 360 mL    Other: 600 mL  Total IN: 960 mL    OUT:    Chest Tube: 590 mL    Indwelling Catheter - Urethral: 215 mL    Other: 2600 mL    Stool: 1 mL  Total OUT: 3406 mL    Total NET: -2446 mL      10-04 @ 07:01  -  10-04 @ 17:28  --------------------------------------------------------  IN:    Albumin 5%  - 250 mL: 500 mL    dexmedetomidine Infusion: 20.6 mL    insulin Infusion: 2 mL    norepinephrine Infusion: 12.4 mL    Packed Red Blood Cells: 300 mL    sodium chloride 0.45%.: 20 mL    vasopressin Infusion: 5 mL  Total IN: 860 mL    OUT:    Chest Tube: 70 mL    Chest Tube: 140 mL    Chest Tube: 55 mL    Indwelling Catheter - Urethral: 50 mL    Stool: 3 mL  Total OUT: 318 mL    Total NET: 542 mL      Physical exam:   Sedated and intubated   HAs a HD catheter on the right IJ   Not in acute respiratory distress on nasal cannula   Normal air entry in the lungs, no wheezing, no crackles, decreased breath sounds on the right base, has a chest tube on the left  RRR, normal s1/s2, no murmurs, rubs or gallops   Abdomen soft, non-tender, normal bowel sounds   Mild peripheral edema - chronic changes over the lower extremities   Mild urinary output in the bag   HAs drainage placed in his chest s/p thoracotomy         LABS:                        9.9    33.1  )-----------( 242      ( 04 Oct 2017 14:08 )             28.2     10-04    137  |  102  |  50<H>  ----------------------------<  152<H>  4.0   |  21<L>  |  3.68<H>    Ca    7.6<L>      04 Oct 2017 14:08  Phos  4.6     10-04  Mg     2.6     10-04    TPro  6.3  /  Alb  3.3  /  TBili  0.8  /  DBili  x   /  AST  15  /  ALT  32  /  AlkPhos  116  10-04      PT/INR - ( 04 Oct 2017 14:08 )   PT: 14.4 sec;   INR: 1.29          PTT - ( 04 Oct 2017 14:08 )  PTT:73.3 sec          RADIOLOGY & ADDITIONAL STUDIES:    < from: Xray Chest 1 View AP- PORTABLE-Urgent (10.04.17 @ 14:03) >    IMPRESSION:  Interval central line and support catheter change as above.  Question a small right apical pneumothorax.  Persistent pulmonary venous congestive changes with improving left-sided   pleural effusion.    < end of copied text >

## 2017-10-04 NOTE — DIETITIAN INITIAL EVALUATION ADULT. - NS AS NUTRI INTERV ENTERAL NUTRITION
If pt remains intubated, route per MD; glucerna 1.5 at goal rate of 60ml/hr x 24hr (1400ml TV, 2160kcal, 118g, 1093ml water)./Route/Concentration/Rate/Composition

## 2017-10-04 NOTE — BRIEF OPERATIVE NOTE - PROCEDURE
<<-----Click on this checkbox to enter Procedure CABG using saphenous vein graft  10/04/2017    Active  DGLASSER

## 2017-10-05 LAB
ALBUMIN SERPL ELPH-MCNC: 3.4 G/DL — SIGNIFICANT CHANGE UP (ref 3.3–5)
ALP SERPL-CCNC: 75 U/L — SIGNIFICANT CHANGE UP (ref 40–120)
ALT FLD-CCNC: 11 U/L — SIGNIFICANT CHANGE UP (ref 10–45)
ANION GAP SERPL CALC-SCNC: 16 MMOL/L — SIGNIFICANT CHANGE UP (ref 5–17)
ANION GAP SERPL CALC-SCNC: 19 MMOL/L — HIGH (ref 5–17)
APTT BLD: 35.9 SEC — SIGNIFICANT CHANGE UP (ref 27.5–37.4)
APTT BLD: 38 SEC — HIGH (ref 27.5–37.4)
APTT BLD: 40.5 SEC — HIGH (ref 27.5–37.4)
AST SERPL-CCNC: 17 U/L — SIGNIFICANT CHANGE UP (ref 10–40)
BASE EXCESS BLDA CALC-SCNC: -5 MMOL/L — LOW (ref -2–3)
BILIRUB SERPL-MCNC: 1.5 MG/DL — HIGH (ref 0.2–1.2)
BUN SERPL-MCNC: 34 MG/DL — HIGH (ref 7–23)
BUN SERPL-MCNC: 54 MG/DL — HIGH (ref 7–23)
CALCIUM SERPL-MCNC: 8.4 MG/DL — SIGNIFICANT CHANGE UP (ref 8.4–10.5)
CALCIUM SERPL-MCNC: 8.4 MG/DL — SIGNIFICANT CHANGE UP (ref 8.4–10.5)
CHLORIDE SERPL-SCNC: 100 MMOL/L — SIGNIFICANT CHANGE UP (ref 96–108)
CHLORIDE SERPL-SCNC: 97 MMOL/L — SIGNIFICANT CHANGE UP (ref 96–108)
CO2 SERPL-SCNC: 19 MMOL/L — LOW (ref 22–31)
CO2 SERPL-SCNC: 23 MMOL/L — SIGNIFICANT CHANGE UP (ref 22–31)
CREAT SERPL-MCNC: 2.31 MG/DL — HIGH (ref 0.5–1.3)
CREAT SERPL-MCNC: 3.53 MG/DL — HIGH (ref 0.5–1.3)
GAS PNL BLDA: SIGNIFICANT CHANGE UP
GLUCOSE SERPL-MCNC: 132 MG/DL — HIGH (ref 70–99)
GLUCOSE SERPL-MCNC: 269 MG/DL — HIGH (ref 70–99)
HCO3 BLDA-SCNC: 19 MMOL/L — LOW (ref 21–28)
HCT VFR BLD CALC: 25.8 % — LOW (ref 39–50)
HCT VFR BLD CALC: 27.7 % — LOW (ref 39–50)
HCT VFR BLD CALC: 27.8 % — LOW (ref 39–50)
HGB BLD-MCNC: 8.9 G/DL — LOW (ref 13–17)
HGB BLD-MCNC: 9.4 G/DL — LOW (ref 13–17)
HGB BLD-MCNC: 9.5 G/DL — LOW (ref 13–17)
INR BLD: 1.21 — HIGH (ref 0.88–1.16)
INR BLD: 1.29 — HIGH (ref 0.88–1.16)
INR BLD: 1.31 — HIGH (ref 0.88–1.16)
LACTATE SERPL-SCNC: 1.1 MMOL/L — SIGNIFICANT CHANGE UP (ref 0.5–2)
MAGNESIUM SERPL-MCNC: 2.1 MG/DL — SIGNIFICANT CHANGE UP (ref 1.6–2.6)
MAGNESIUM SERPL-MCNC: 2.4 MG/DL — SIGNIFICANT CHANGE UP (ref 1.6–2.6)
MCHC RBC-ENTMCNC: 30 PG — SIGNIFICANT CHANGE UP (ref 27–34)
MCHC RBC-ENTMCNC: 30.1 PG — SIGNIFICANT CHANGE UP (ref 27–34)
MCHC RBC-ENTMCNC: 31.1 PG — SIGNIFICANT CHANGE UP (ref 27–34)
MCHC RBC-ENTMCNC: 33.9 G/DL — SIGNIFICANT CHANGE UP (ref 32–36)
MCHC RBC-ENTMCNC: 34.2 G/DL — SIGNIFICANT CHANGE UP (ref 32–36)
MCHC RBC-ENTMCNC: 34.5 G/DL — SIGNIFICANT CHANGE UP (ref 32–36)
MCV RBC AUTO: 86.9 FL — SIGNIFICANT CHANGE UP (ref 80–100)
MCV RBC AUTO: 88.8 FL — SIGNIFICANT CHANGE UP (ref 80–100)
MCV RBC AUTO: 91.1 FL — SIGNIFICANT CHANGE UP (ref 80–100)
PCO2 BLDA: 30 MMHG — LOW (ref 35–48)
PH BLDA: 7.41 — SIGNIFICANT CHANGE UP (ref 7.35–7.45)
PHOSPHATE SERPL-MCNC: 3.4 MG/DL — SIGNIFICANT CHANGE UP (ref 2.5–4.5)
PHOSPHATE SERPL-MCNC: 5 MG/DL — HIGH (ref 2.5–4.5)
PLATELET # BLD AUTO: 152 K/UL — SIGNIFICANT CHANGE UP (ref 150–400)
PLATELET # BLD AUTO: 154 K/UL — SIGNIFICANT CHANGE UP (ref 150–400)
PLATELET # BLD AUTO: 197 K/UL — SIGNIFICANT CHANGE UP (ref 150–400)
PO2 BLDA: 100 MMHG — SIGNIFICANT CHANGE UP (ref 83–108)
POTASSIUM SERPL-MCNC: 4.3 MMOL/L — SIGNIFICANT CHANGE UP (ref 3.5–5.3)
POTASSIUM SERPL-MCNC: 4.6 MMOL/L — SIGNIFICANT CHANGE UP (ref 3.5–5.3)
POTASSIUM SERPL-SCNC: 4.3 MMOL/L — SIGNIFICANT CHANGE UP (ref 3.5–5.3)
POTASSIUM SERPL-SCNC: 4.6 MMOL/L — SIGNIFICANT CHANGE UP (ref 3.5–5.3)
PROT SERPL-MCNC: 5.6 G/DL — LOW (ref 6–8.3)
PROTHROM AB SERPL-ACNC: 13.5 SEC — HIGH (ref 9.8–12.7)
PROTHROM AB SERPL-ACNC: 14.4 SEC — HIGH (ref 9.8–12.7)
PROTHROM AB SERPL-ACNC: 14.6 SEC — HIGH (ref 9.8–12.7)
RBC # BLD: 2.97 M/UL — LOW (ref 4.2–5.8)
RBC # BLD: 3.05 M/UL — LOW (ref 4.2–5.8)
RBC # BLD: 3.12 M/UL — LOW (ref 4.2–5.8)
RBC # FLD: 14.5 % — SIGNIFICANT CHANGE UP (ref 10.3–16.9)
RBC # FLD: 15.9 % — SIGNIFICANT CHANGE UP (ref 10.3–16.9)
RBC # FLD: 16.2 % — SIGNIFICANT CHANGE UP (ref 10.3–16.9)
SAO2 % BLDA: 97 % — SIGNIFICANT CHANGE UP (ref 95–100)
SODIUM SERPL-SCNC: 136 MMOL/L — SIGNIFICANT CHANGE UP (ref 135–145)
SODIUM SERPL-SCNC: 138 MMOL/L — SIGNIFICANT CHANGE UP (ref 135–145)
WBC # BLD: 16 K/UL — HIGH (ref 3.8–10.5)
WBC # BLD: 16.7 K/UL — HIGH (ref 3.8–10.5)
WBC # BLD: 19 K/UL — HIGH (ref 3.8–10.5)
WBC # FLD AUTO: 16 K/UL — HIGH (ref 3.8–10.5)
WBC # FLD AUTO: 16.7 K/UL — HIGH (ref 3.8–10.5)
WBC # FLD AUTO: 19 K/UL — HIGH (ref 3.8–10.5)

## 2017-10-05 PROCEDURE — 99291 CRITICAL CARE FIRST HOUR: CPT

## 2017-10-05 PROCEDURE — 71010: CPT | Mod: 26

## 2017-10-05 PROCEDURE — 90935 HEMODIALYSIS ONE EVALUATION: CPT | Mod: GC

## 2017-10-05 RX ORDER — ALBUMIN HUMAN 25 %
250 VIAL (ML) INTRAVENOUS ONCE
Qty: 0 | Refills: 0 | Status: COMPLETED | OUTPATIENT
Start: 2017-10-05 | End: 2017-10-05

## 2017-10-05 RX ORDER — GLUCAGON INJECTION, SOLUTION 0.5 MG/.1ML
1 INJECTION, SOLUTION SUBCUTANEOUS ONCE
Qty: 0 | Refills: 0 | Status: DISCONTINUED | OUTPATIENT
Start: 2017-10-05 | End: 2017-10-07

## 2017-10-05 RX ORDER — ALBUMIN HUMAN 25 %
50 VIAL (ML) INTRAVENOUS ONCE
Qty: 0 | Refills: 0 | Status: COMPLETED | OUTPATIENT
Start: 2017-10-05 | End: 2017-10-05

## 2017-10-05 RX ORDER — CEFAZOLIN SODIUM 1 G
2000 VIAL (EA) INJECTION ONCE
Qty: 0 | Refills: 0 | Status: COMPLETED | OUTPATIENT
Start: 2017-10-05 | End: 2017-10-05

## 2017-10-05 RX ORDER — SENNA PLUS 8.6 MG/1
2 TABLET ORAL AT BEDTIME
Qty: 0 | Refills: 0 | Status: DISCONTINUED | OUTPATIENT
Start: 2017-10-05 | End: 2017-10-07

## 2017-10-05 RX ORDER — DEXTROSE 50 % IN WATER 50 %
12.5 SYRINGE (ML) INTRAVENOUS ONCE
Qty: 0 | Refills: 0 | Status: DISCONTINUED | OUTPATIENT
Start: 2017-10-05 | End: 2017-10-07

## 2017-10-05 RX ORDER — INSULIN LISPRO 100/ML
VIAL (ML) SUBCUTANEOUS
Qty: 0 | Refills: 0 | Status: DISCONTINUED | OUTPATIENT
Start: 2017-10-05 | End: 2017-10-07

## 2017-10-05 RX ORDER — OXYCODONE AND ACETAMINOPHEN 5; 325 MG/1; MG/1
1 TABLET ORAL EVERY 4 HOURS
Qty: 0 | Refills: 0 | Status: DISCONTINUED | OUTPATIENT
Start: 2017-10-05 | End: 2017-10-10

## 2017-10-05 RX ORDER — DEXTROSE 50 % IN WATER 50 %
25 SYRINGE (ML) INTRAVENOUS ONCE
Qty: 0 | Refills: 0 | Status: DISCONTINUED | OUTPATIENT
Start: 2017-10-05 | End: 2017-10-07

## 2017-10-05 RX ORDER — ACETAMINOPHEN 500 MG
1000 TABLET ORAL ONCE
Qty: 0 | Refills: 0 | Status: COMPLETED | OUTPATIENT
Start: 2017-10-05 | End: 2017-10-05

## 2017-10-05 RX ORDER — SODIUM BICARBONATE 1 MEQ/ML
50 SYRINGE (ML) INTRAVENOUS ONCE
Qty: 0 | Refills: 0 | Status: COMPLETED | OUTPATIENT
Start: 2017-10-05 | End: 2017-10-05

## 2017-10-05 RX ORDER — POLYETHYLENE GLYCOL 3350 17 G/17G
17 POWDER, FOR SOLUTION ORAL DAILY
Qty: 0 | Refills: 0 | Status: DISCONTINUED | OUTPATIENT
Start: 2017-10-05 | End: 2017-10-07

## 2017-10-05 RX ORDER — TRAMADOL HYDROCHLORIDE 50 MG/1
25 TABLET ORAL ONCE
Qty: 0 | Refills: 0 | Status: DISCONTINUED | OUTPATIENT
Start: 2017-10-05 | End: 2017-10-05

## 2017-10-05 RX ORDER — OXYCODONE AND ACETAMINOPHEN 5; 325 MG/1; MG/1
2 TABLET ORAL EVERY 6 HOURS
Qty: 0 | Refills: 0 | Status: DISCONTINUED | OUTPATIENT
Start: 2017-10-05 | End: 2017-10-11

## 2017-10-05 RX ORDER — DOCUSATE SODIUM 100 MG
100 CAPSULE ORAL THREE TIMES A DAY
Qty: 0 | Refills: 0 | Status: DISCONTINUED | OUTPATIENT
Start: 2017-10-05 | End: 2017-10-07

## 2017-10-05 RX ORDER — DEXTROSE 50 % IN WATER 50 %
1 SYRINGE (ML) INTRAVENOUS ONCE
Qty: 0 | Refills: 0 | Status: DISCONTINUED | OUTPATIENT
Start: 2017-10-05 | End: 2017-10-07

## 2017-10-05 RX ORDER — ALBUMIN HUMAN 25 %
VIAL (ML) INTRAVENOUS
Qty: 0 | Refills: 0 | Status: COMPLETED | OUTPATIENT
Start: 2017-10-05 | End: 2017-10-05

## 2017-10-05 RX ORDER — SODIUM CHLORIDE 9 MG/ML
1000 INJECTION, SOLUTION INTRAVENOUS
Qty: 0 | Refills: 0 | Status: DISCONTINUED | OUTPATIENT
Start: 2017-10-05 | End: 2017-10-07

## 2017-10-05 RX ORDER — CALCIUM GLUCONATE 100 MG/ML
2 VIAL (ML) INTRAVENOUS ONCE
Qty: 0 | Refills: 0 | Status: COMPLETED | OUTPATIENT
Start: 2017-10-05 | End: 2017-10-05

## 2017-10-05 RX ORDER — FAMOTIDINE 10 MG/ML
20 INJECTION INTRAVENOUS DAILY
Qty: 0 | Refills: 0 | Status: DISCONTINUED | OUTPATIENT
Start: 2017-10-05 | End: 2017-10-06

## 2017-10-05 RX ORDER — ATORVASTATIN CALCIUM 80 MG/1
40 TABLET, FILM COATED ORAL AT BEDTIME
Qty: 0 | Refills: 0 | Status: DISCONTINUED | OUTPATIENT
Start: 2017-10-05 | End: 2017-10-05

## 2017-10-05 RX ADMIN — Medication 1000 MILLIGRAM(S): at 07:25

## 2017-10-05 RX ADMIN — Medication 2: at 22:05

## 2017-10-05 RX ADMIN — Medication 50 MILLILITER(S): at 11:52

## 2017-10-05 RX ADMIN — TRAMADOL HYDROCHLORIDE 25 MILLIGRAM(S): 50 TABLET ORAL at 18:15

## 2017-10-05 RX ADMIN — ATORVASTATIN CALCIUM 40 MILLIGRAM(S): 80 TABLET, FILM COATED ORAL at 22:04

## 2017-10-05 RX ADMIN — TRAMADOL HYDROCHLORIDE 25 MILLIGRAM(S): 50 TABLET ORAL at 17:25

## 2017-10-05 RX ADMIN — HEPARIN SODIUM 5000 UNIT(S): 5000 INJECTION INTRAVENOUS; SUBCUTANEOUS at 13:38

## 2017-10-05 RX ADMIN — HEPARIN SODIUM 5000 UNIT(S): 5000 INJECTION INTRAVENOUS; SUBCUTANEOUS at 06:50

## 2017-10-05 RX ADMIN — Medication 400 MILLIGRAM(S): at 07:12

## 2017-10-05 RX ADMIN — Medication 50 MILLILITER(S): at 10:35

## 2017-10-05 RX ADMIN — Medication 400 GRAM(S): at 23:50

## 2017-10-05 RX ADMIN — OXYCODONE AND ACETAMINOPHEN 1 TABLET(S): 5; 325 TABLET ORAL at 23:30

## 2017-10-05 RX ADMIN — TRAMADOL HYDROCHLORIDE 25 MILLIGRAM(S): 50 TABLET ORAL at 19:55

## 2017-10-05 RX ADMIN — Medication 125 MILLILITER(S): at 17:30

## 2017-10-05 RX ADMIN — Medication 100 MILLIGRAM(S): at 16:31

## 2017-10-05 RX ADMIN — TRAMADOL HYDROCHLORIDE 25 MILLIGRAM(S): 50 TABLET ORAL at 19:00

## 2017-10-05 RX ADMIN — OXYCODONE AND ACETAMINOPHEN 1 TABLET(S): 5; 325 TABLET ORAL at 22:43

## 2017-10-05 RX ADMIN — Medication 1000 MILLIGRAM(S): at 16:50

## 2017-10-05 RX ADMIN — FAMOTIDINE 20 MILLIGRAM(S): 10 INJECTION INTRAVENOUS at 13:37

## 2017-10-05 RX ADMIN — Medication 400 MILLIGRAM(S): at 16:20

## 2017-10-05 RX ADMIN — HEPARIN SODIUM 5000 UNIT(S): 5000 INJECTION INTRAVENOUS; SUBCUTANEOUS at 22:05

## 2017-10-05 RX ADMIN — Medication 100 MILLIGRAM(S): at 01:14

## 2017-10-05 RX ADMIN — Medication 50 MILLIEQUIVALENT(S): at 01:07

## 2017-10-05 RX ADMIN — Medication 81 MILLIGRAM(S): at 13:37

## 2017-10-05 RX ADMIN — Medication 3: at 17:06

## 2017-10-05 RX ADMIN — CLOPIDOGREL BISULFATE 75 MILLIGRAM(S): 75 TABLET, FILM COATED ORAL at 13:37

## 2017-10-05 RX ADMIN — Medication 100 MILLIGRAM(S): at 09:34

## 2017-10-05 NOTE — PROGRESS NOTE ADULT - PROBLEM SELECTOR PLAN 1
- most likely due to cardiorenal syndrome and/or contrast induced - oliguric ATN   - was started on HD on 10/2 - two sessions   - has a HD catheter on the right IJ   - still oliguric this morning - 337  - u/s of the bladder and kidneys - noted - no hydronephrosis   - please him on renal diet   - follow in and outs  - electrolytes acceptable K 4.6  - calcium 8.4

## 2017-10-05 NOTE — PROGRESS NOTE ADULT - ASSESSMENT
This is 66 year old male with PMH stated above. Now in th CT ICU - for possible CABG. Started on HD last night for oliguric YOANDY (could be due to the cardiorenal syndrom and/or Contrast induced nephroapthy). He was  started on HD on 10/2 two session so far. Today still oliguria - 337 Underwent surgical intervention on 10/5 - s/p CABG. Today he will be dialyzed for fluid removal, still oliguric

## 2017-10-05 NOTE — PHYSICAL THERAPY INITIAL EVALUATION ADULT - GENERAL OBSERVATIONS, REHAB EVAL
Patient received seated out of bed no acute distress +telemetry +2L O2 NC +chest tube x3 to wall suction +miranda +IV +[place]. Patient left as found all lines intact +call sheriff. MAXIMINO Gonsales and MAXIMINO Harris aware.

## 2017-10-05 NOTE — PHYSICAL THERAPY INITIAL EVALUATION ADULT - PHYSICAL ASSIST/NONPHYSICAL ASSIST: GAIT, REHAB EVAL
verbal cues/nonverbal cues (demo/gestures)/1 person + 1 person to manage equipment/+IV pole +chair follow

## 2017-10-05 NOTE — PHYSICAL THERAPY INITIAL EVALUATION ADULT - PERTINENT HX OF CURRENT PROBLEM, REHAB EVAL
66 year old male with history of DM, HLD and prostatic cancer s/p radiation therapy in 2010 was brought in by ambulance after a syncopal episode. Patient transferred from [facility] to [facility] for CABG workup.

## 2017-10-05 NOTE — PROGRESS NOTE ADULT - SUBJECTIVE AND OBJECTIVE BOX
Objective and subjective   Mr Roldan is sitting in his chair and eating his breakfast. Feels a little short of breath, chest pain - s/p surgery.       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 Here over the past 24 hours found to have worsening of the kidney fiunction - oliguria and fluid overload. Started on HD - 10/2 - got  2 session so far       aspirin enteric coated 81 milliGRAM(s) daily  atorvastatin 40 milliGRAM(s) at bedtime  ceFAZolin   IVPB 2000 milliGRAM(s) once  clopidogrel Tablet 75 milliGRAM(s) daily  dextrose 5%. 1000 milliLiter(s) <Continuous>  dextrose 50% Injectable 12.5 Gram(s) once  dextrose 50% Injectable 25 Gram(s) once  dextrose 50% Injectable 25 Gram(s) once  dextrose Gel 1 Dose(s) once PRN  docusate sodium 100 milliGRAM(s) three times a day  famotidine    Tablet 20 milliGRAM(s) daily  glucagon  Injectable 1 milliGRAM(s) once PRN  heparin  Injectable 5000 Unit(s) every 8 hours  insulin lispro (HumaLOG) corrective regimen sliding scale   Before meals and at bedtime  norepinephrine Infusion 0.01 MICROgram(s)/kG/Min <Continuous>  polyethylene glycol 3350 17 Gram(s) daily PRN  senna 2 Tablet(s) at bedtime  sodium chloride 0.45%. 1000 milliLiter(s) <Continuous>      Allergies    No Known Allergies    Intolerances        T(C): , Max: 36.6 (10-05-17 @ 02:00)  T(F): , Max: 97.9 (10-05-17 @ 02:00)  HR: 82 (10-05-17 @ 12:00)  BP: 79/59 (10-04-17 @ 14:15)  BP(mean): 64 (10-04-17 @ 14:15)  RR: 10 (10-05-17 @ 12:00)  SpO2: 99% (10-05-17 @ 12:00)  Wt(kg): --    10-04 @ 07:01  -  10-05 @ 07:00  --------------------------------------------------------  IN:    Albumin 5%  - 250 mL: 500 mL    dexmedetomidine Infusion: 68 mL    insulin Infusion: 34 mL    IV PiggyBack: 450 mL    norepinephrine Infusion: 84.9 mL    Oral Fluid: 110 mL    Packed Red Blood Cells: 300 mL    sodium chloride 0.45%.: 260 mL    vasopressin Infusion: 21 mL  Total IN: 1827.9 mL    OUT:    Chest Tube: 300 mL    Chest Tube: 470 mL    Chest Tube: 370 mL    Indwelling Catheter - Urethral: 377 mL    Stool: 3 mL  Total OUT: 1520 mL    Total NET: 307.9 mL      10-05 @ 07:01  -  10-05 @ 12:56  --------------------------------------------------------  IN:    IV PiggyBack: 50 mL    norepinephrine Infusion: 23 mL    Oral Fluid: 240 mL    sodium chloride 0.45%.: 50 mL  Total IN: 363 mL    OUT:    Chest Tube: 60 mL    Chest Tube: 50 mL    Chest Tube: 30 mL    Indwelling Catheter - Urethral: 105 mL  Total OUT: 245 mL    Total NET: 118 mL        Physical exam:   Alert and awake (extubated yesterday evening)  HAs a HD catheter on the right IJ   Not in acute respiratory distress on nasal cannula   Normal air entry in the lungs, no wheezing, no crackles, decreased breath sounds on the right base, has a chest tube on the left  RRR, normal s1/s2, no murmurs, rubs or gallops   Abdomen soft, non-tender, normal bowel sounds   Mild peripheral edema - chronic changes over the lower extremities   Mild urinary output in the bag   HAs drainage placed in his chest s/p thoracotomy         LABS:                        9.5    19.0  )-----------( 197      ( 05 Oct 2017 03:06 )             27.8     10-05    138  |  100  |  54<H>  ----------------------------<  132<H>  4.6   |  19<L>  |  3.53<H>    Ca    8.4      05 Oct 2017 03:07  Phos  5.0     10-05  Mg     2.4     10-05    TPro  5.6<L>  /  Alb  3.2<L>  /  TBili  0.8  /  DBili  x   /  AST  15  /  ALT  18  /  AlkPhos  80  10-04      PT/INR - ( 05 Oct 2017 03:06 )   PT: 13.5 sec;   INR: 1.21          PTT - ( 05 Oct 2017 03:06 )  PTT:35.9 sec          RADIOLOGY & ADDITIONAL STUDIES:

## 2017-10-05 NOTE — PROGRESS NOTE ADULT - SUBJECTIVE AND OBJECTIVE BOX
INTERVAL HPI/OVERNIGHT EVENTS:    POD#1: OPCAB x 3  EF 25-30%  Mild-Mod MR    65yo male Hx DM, chol. prostate Ca - XRT '10 presenting with near syncope  reported multiple days of diarrhea after taking laxatives for constipation - on assessment - elevated Cr/hyponatremia/elevated trop but no ischemic changes on EKG - (+)NSTEMI  IVF resuscitation with improvement  reported recent admit for hypoglycemia and recent course Abx for "viral syndrome" per PMD    Cath 9/29: EF 25% and 3vCAD ( MidLAD 80%, distal LAD diagonal 85%, Prox circ 95%, . left PDA 65%, Prox RCA 70, Mid RCA 85% and Distal PCA 90%)  ECHO: Mod TR    Referred for CTS evaluation  transferred to ICU for RRT - RIJ Shiley placed 10/1  kaitlyn placed ; right pigtail placed for effusion - 2L out  serial HD sessions performed    To OR today - OPCAB x 3 -   EF 25-30% - reported improvement in MR - now mild-moderate MR  no infusions/blood products given intraop    on arrival to ICU - hypotensive - SBP 70's - 1 U pRBC started/1 albumin and required initiation of levophed/vasopressin to maintain MAP 60  improved hemodynamics - off Vasopressin  Extubated at MN - reported some mild confusion but readily redirected and orientable    plan HD today and transfuse 1U pRBC  no acute events overnight    PMHx includes but is not limited to:  Prostatic cancer  DM  HTN    ICU Vital Signs Last 24 Hrs  T(C): 36.4 (05 Oct 2017 11:00), Max: 36.6 (05 Oct 2017 02:00)  T(F): 97.6 (05 Oct 2017 11:00), Max: 97.9 (05 Oct 2017 02:00)  HR: 78 (05 Oct 2017 11:00) (68 - 90) sinus  BP: 79/59 (04 Oct 2017 14:15) (79/59 - 79/59)  BP(mean): 64 (04 Oct 2017 14:15) (64 - 64)  ABP: 122/52 (05 Oct 2017 11:00) (84/50 - 137/51)  ABP(mean): 70 (05 Oct 2017 11:00) (60 - 82)  SpO2: 99% (05 Oct 2017 11:00) (96% - 100%)    Qtts:     I&O's Summary    04 Oct 2017 07:01  -  05 Oct 2017 07:00  --------------------------------------------------------  IN: 1827.9 mL / OUT: 1520 mL / NET: 307.9 mL    05 Oct 2017 07:01  -  05 Oct 2017 11:29  --------------------------------------------------------  IN: 348.4 mL / OUT: 205 mL / NET: 143.4 mL        Mode: CPAP with PS  FiO2: 50  PEEP: 5  PS: 12  MAP: 7  PIP: 18      Physical Exam    Heart  Lungs  Abd  Ext  Chest  Neuro  Skin    LABS:                        9.5    19.0  )-----------( 197      ( 05 Oct 2017 03:06 )             27.8     10-05    138  |  100  |  54<H>  ----------------------------<  132<H>  4.6   |  19<L>  |  3.53<H>    Ca    8.4      05 Oct 2017 03:07  Phos  5.0     10-05  Mg     2.4     10-05    TPro  5.6<L>  /  Alb  3.2<L>  /  TBili  0.8  /  DBili  x   /  AST  15  /  ALT  18  /  AlkPhos  80  10-04    PT/INR - ( 05 Oct 2017 03:06 )   PT: 13.5 sec;   INR: 1.21          PTT - ( 05 Oct 2017 03:06 )  PTT:35.9 sec    ABG - ( 05 Oct 2017 03:07 )  pH: 7.45  /  pCO2: 31    /  pO2: 162   / HCO3: 21    / Base Excess: -2.3  /  SaO2: 99                  RADIOLOGY & ADDITIONAL STUDIES:    I have spent/provided stated minutes of critical care time to this patient: INTERVAL HPI/OVERNIGHT EVENTS:    POD#1: OPCAB x 3  EF 25-30%  Mild-Mod MR    65yo male Hx DM, chol. prostate Ca - XRT '10 presenting with near syncope  reported multiple days of diarrhea after taking laxatives for constipation - on assessment - elevated Cr/hyponatremia/elevated trop but no ischemic changes on EKG - (+)NSTEMI  IVF resuscitation with improvement  reported recent admit for hypoglycemia and recent course Abx for "viral syndrome" per PMD    Cath 9/29: EF 25% and 3vCAD ( MidLAD 80%, distal LAD diagonal 85%, Prox circ 95%, . left PDA 65%, Prox RCA 70, Mid RCA 85% and Distal PCA 90%)  ECHO: Mod TR    Referred for CTS evaluation  transferred to ICU for RRT - RIJ Shiley placed 10/1  kaitlyn placed ; right pigtail placed for effusion - 2L out  serial HD sessions performed    To OR today - OPCAB x 3 -   EF 25-30% - reported improvement in MR - now mild-moderate MR  no infusions/blood products given intraop    on arrival to ICU - hypotensive - SBP 70's - 1 U pRBC started/1 albumin and required initiation of levophed/vasopressin to maintain MAP 60  improved hemodynamics - off Vasopressin  Extubated at MN - reported some mild confusion but readily redirected and orientable    plan HD today and transfuse 1U pRBC  no acute events overnight    PMHx includes but is not limited to:  Prostatic cancer  DM  HTN    ICU Vital Signs Last 24 Hrs  T(C): 36.4 (05 Oct 2017 11:00), Max: 36.6 (05 Oct 2017 02:00)  T(F): 97.6 (05 Oct 2017 11:00), Max: 97.9 (05 Oct 2017 02:00)  HR: 78 (05 Oct 2017 11:00) (68 - 90) sinus  BP: 79/59 (04 Oct 2017 14:15) (79/59 - 79/59)  BP(mean): 64 (04 Oct 2017 14:15) (64 - 64)  ABP: 122/52 (05 Oct 2017 11:00) (84/50 - 137/51)  ABP(mean): 70 (05 Oct 2017 11:00) (60 - 82)  SpO2: 99% (05 Oct 2017 11:00) (96% - 100%)    Qtts:   Levophed 0.03    I&O's Summary    04 Oct 2017 07:01  -  05 Oct 2017 07:00  --------------------------------------------------------  IN: 1827.9 mL / OUT: 1520 mL / NET: 307.9 mL    05 Oct 2017 07:01  -  05 Oct 2017 11:29  --------------------------------------------------------  IN: 348.4 mL / OUT: 205 mL / NET: 143.4 mL    Physical Exam    Heart - regular (-)rub/gallop  Lungs - CTA anterior (-)rales/rhonchi/wheeze  Abd - (+)BS soft (-)r/r/g  Ext - warm to touch; no cyanosis/clubbing/edema  Chest - Op bandage in place  Neuro - alert/oriented and interactive. moving all extremities and non-focal  Skin - no rash    LABS:                        9.5    19.0  )-----------( 197      ( 05 Oct 2017 03:06 )             27.8     10-05    138  |  100  |  54<H>  ----------------------------<  132<H>  4.6   |  19<L>  |  3.53<H>    Ca    8.4      05 Oct 2017 03:07  Phos  5.0     10-05  Mg     2.4     10-05    TPro  5.6<L>  /  Alb  3.2<L>  /  TBili  0.8  /  DBili  x   /  AST  15  /  ALT  18  /  AlkPhos  80  10-04    PT/INR - ( 05 Oct 2017 03:06 )   PT: 13.5 sec;   INR: 1.21     PTT - ( 05 Oct 2017 03:06 )  PTT:35.9 sec    ABG - ( 05 Oct 2017 03:07 ) 7.45/31/162/99     RADIOLOGY & ADDITIONAL STUDIES: reviewed    I have spent/provided stated minutes of critical care time to this patient: 60 min INTERVAL HPI/OVERNIGHT EVENTS:    POD#1: OPCAB x 3  EF 25-30%  Mild-Mod MR    67yo male Hx DM, chol. prostate Ca - XRT '10 presenting with near syncope  reported multiple days of diarrhea after taking laxatives for constipation - on assessment - elevated Cr/hyponatremia/elevated trop but no ischemic changes on EKG - (+)NSTEMI  IVF resuscitation with improvement  reported recent admit for hypoglycemia and recent course Abx for "viral syndrome" per PMD    Cath 9/29: EF 25% and 3vCAD ( MidLAD 80%, distal LAD diagonal 85%, Prox circ 95%, . left PDA 65%, Prox RCA 70, Mid RCA 85% and Distal PCA 90%)  ECHO: Mod TR    Referred for CTS evaluation  transferred to ICU for RRT - RIJ Shiley placed 10/1  kaitlyn placed ; right pigtail placed for effusion - 2L out  serial HD sessions performed    To OR today - OPCAB x 3 -   EF 25-30% - reported improvement in MR - now mild-moderate MR  no infusions/blood products given intraop    on arrival to ICU - hypotensive - SBP 70's - 1 U pRBC started/1 albumin and required initiation of levophed/vasopressin to maintain MAP 60  improved hemodynamics - off Vasopressin  Extubated at MN - reported some mild confusion but readily redirected and orientable    plan HD today and transfuse 1U pRBC  no acute events overnight    PMHx includes but is not limited to:  Prostatic cancer  DM  HTN    ICU Vital Signs Last 24 Hrs  T(C): 36.4 (05 Oct 2017 11:00), Max: 36.6 (05 Oct 2017 02:00)  T(F): 97.6 (05 Oct 2017 11:00), Max: 97.9 (05 Oct 2017 02:00)  HR: 78 (05 Oct 2017 11:00) (68 - 90) sinus  BP: 79/59 (04 Oct 2017 14:15) (79/59 - 79/59)  BP(mean): 64 (04 Oct 2017 14:15) (64 - 64)  ABP: 122/52 (05 Oct 2017 11:00) (84/50 - 137/51)  ABP(mean): 70 (05 Oct 2017 11:00) (60 - 82)  SpO2: 99% (05 Oct 2017 11:00) (96% - 100%)    Qtts:   Levophed 0.03    I&O's Summary    04 Oct 2017 07:01  -  05 Oct 2017 07:00  --------------------------------------------------------  IN: 1827.9 mL / OUT: 1520 mL / NET: 307.9 mL    05 Oct 2017 07:01  -  05 Oct 2017 11:29  --------------------------------------------------------  IN: 348.4 mL / OUT: 205 mL / NET: 143.4 mL    Physical Exam    Heart - regular (-)rub/gallop  Lungs - CTA anterior (-)rales/rhonchi/wheeze  Abd - (+)BS soft (-)r/r/g  Ext - warm to touch; no cyanosis/clubbing/edema  Chest - Op bandage in place  Neuro - alert/oriented and interactive. moving all extremities and non-focal  Skin - no rash    LABS:                        9.5    19.0  )-----------( 197      ( 05 Oct 2017 03:06 )             27.8     10-05    138  |  100  |  54<H>  ----------------------------<  132<H>  4.6   |  19<L>  |  3.53<H>    Ca    8.4      05 Oct 2017 03:07  Phos  5.0     10-05  Mg     2.4     10-05    TPro  5.6<L>  /  Alb  3.2<L>  /  TBili  0.8  /  DBili  x   /  AST  15  /  ALT  18  /  AlkPhos  80  10-04    PT/INR - ( 05 Oct 2017 03:06 )   PT: 13.5 sec;   INR: 1.21     PTT - ( 05 Oct 2017 03:06 )  PTT:35.9 sec    ABG - ( 05 Oct 2017 03:07 ) 7.45/31/162/99     RADIOLOGY & ADDITIONAL STUDIES: reviewed    patient now POD#1 OPCAB x 3 - doing well    1. CV -   vasodilatory shock post-op - now improved with dec pressor requirements  off Vasopressin; remains on levophed 0.03  sinus rhythm  ASA/Plavix/statin  complete periop Abx prophylaxis    2. Pulm   extubated at MN  on 2L NC with Good Sa02 - attempt titrate down to off post-HD  ambulation and incentive spirometry  monitor CT output    3. Renal  HD session today - plan 1 U pRBC with HD;  serial HD  d/w renal attending  renally adjust meds prn for HD    4. Endocrine  maintain glycemic control  /99  cont ISS    bowel regimen  pain management - avoiding narcotics if possible as patient indicates feels "foggy" mentally    DVT and GI prophylaxis    d/w patient/wife present in room; staff; HD staff/Renal attending and CTS    I have spent/provided stated minutes of critical care time to this patient: 60 min

## 2017-10-05 NOTE — PROGRESS NOTE ADULT - SUBJECTIVE AND OBJECTIVE BOX
Patient was seen and evaluated on dialysis.   Patient is tolerating the procedure well.   HR: 82 (10-05-17 @ 12:00)  BP: 79/59 (10-04-17 @ 14:15) pusle 65, /67  Continue dialysis:   Dialyzer:  180     QB: 400        QD: 500  Goal UF 2 liters  over 3 Hours

## 2017-10-06 LAB
ALBUMIN SERPL ELPH-MCNC: 3 G/DL — LOW (ref 3.3–5)
ALBUMIN SERPL ELPH-MCNC: 3.1 G/DL — LOW (ref 3.3–5)
ALBUMIN SERPL ELPH-MCNC: 3.3 G/DL — SIGNIFICANT CHANGE UP (ref 3.3–5)
ALP SERPL-CCNC: 62 U/L — SIGNIFICANT CHANGE UP (ref 40–120)
ALP SERPL-CCNC: 68 U/L — SIGNIFICANT CHANGE UP (ref 40–120)
ALP SERPL-CCNC: 73 U/L — SIGNIFICANT CHANGE UP (ref 40–120)
ALT FLD-CCNC: 6 U/L — LOW (ref 10–45)
ALT FLD-CCNC: 7 U/L — LOW (ref 10–45)
ALT FLD-CCNC: <5 U/L — LOW (ref 10–45)
ANION GAP SERPL CALC-SCNC: 15 MMOL/L — SIGNIFICANT CHANGE UP (ref 5–17)
ANION GAP SERPL CALC-SCNC: 15 MMOL/L — SIGNIFICANT CHANGE UP (ref 5–17)
ANION GAP SERPL CALC-SCNC: 16 MMOL/L — SIGNIFICANT CHANGE UP (ref 5–17)
APTT BLD: 40.6 SEC — HIGH (ref 27.5–37.4)
APTT BLD: 96.7 SEC — HIGH (ref 27.5–37.4)
AST SERPL-CCNC: 10 U/L — SIGNIFICANT CHANGE UP (ref 10–40)
AST SERPL-CCNC: 14 U/L — SIGNIFICANT CHANGE UP (ref 10–40)
AST SERPL-CCNC: 14 U/L — SIGNIFICANT CHANGE UP (ref 10–40)
BILIRUB DIRECT SERPL-MCNC: 0.3 MG/DL — HIGH (ref 0–0.2)
BILIRUB INDIRECT FLD-MCNC: 0.6 MG/DL — SIGNIFICANT CHANGE UP (ref 0.2–1)
BILIRUB SERPL-MCNC: 0.9 MG/DL — SIGNIFICANT CHANGE UP (ref 0.2–1.2)
BILIRUB SERPL-MCNC: 0.9 MG/DL — SIGNIFICANT CHANGE UP (ref 0.2–1.2)
BILIRUB SERPL-MCNC: 1.1 MG/DL — SIGNIFICANT CHANGE UP (ref 0.2–1.2)
BLD GP AB SCN SERPL QL: NEGATIVE — SIGNIFICANT CHANGE UP
BUN SERPL-MCNC: 35 MG/DL — HIGH (ref 7–23)
BUN SERPL-MCNC: 38 MG/DL — HIGH (ref 7–23)
BUN SERPL-MCNC: 39 MG/DL — HIGH (ref 7–23)
CALCIUM SERPL-MCNC: 8.1 MG/DL — LOW (ref 8.4–10.5)
CALCIUM SERPL-MCNC: 8.4 MG/DL — SIGNIFICANT CHANGE UP (ref 8.4–10.5)
CALCIUM SERPL-MCNC: 8.9 MG/DL — SIGNIFICANT CHANGE UP (ref 8.4–10.5)
CHLORIDE SERPL-SCNC: 96 MMOL/L — SIGNIFICANT CHANGE UP (ref 96–108)
CHLORIDE SERPL-SCNC: 97 MMOL/L — SIGNIFICANT CHANGE UP (ref 96–108)
CHLORIDE SERPL-SCNC: 99 MMOL/L — SIGNIFICANT CHANGE UP (ref 96–108)
CO2 SERPL-SCNC: 21 MMOL/L — LOW (ref 22–31)
CO2 SERPL-SCNC: 23 MMOL/L — SIGNIFICANT CHANGE UP (ref 22–31)
CO2 SERPL-SCNC: 24 MMOL/L — SIGNIFICANT CHANGE UP (ref 22–31)
CREAT SERPL-MCNC: 2.57 MG/DL — HIGH (ref 0.5–1.3)
CREAT SERPL-MCNC: 2.58 MG/DL — HIGH (ref 0.5–1.3)
CREAT SERPL-MCNC: 2.64 MG/DL — HIGH (ref 0.5–1.3)
GAS PNL BLDA: SIGNIFICANT CHANGE UP
GAS PNL BLDA: SIGNIFICANT CHANGE UP
GLUCOSE SERPL-MCNC: 163 MG/DL — HIGH (ref 70–99)
GLUCOSE SERPL-MCNC: 170 MG/DL — HIGH (ref 70–99)
GLUCOSE SERPL-MCNC: 182 MG/DL — HIGH (ref 70–99)
HCT VFR BLD CALC: 26.5 % — LOW (ref 39–50)
HCT VFR BLD CALC: 28.1 % — LOW (ref 39–50)
HGB BLD-MCNC: 9.2 G/DL — LOW (ref 13–17)
HGB BLD-MCNC: 9.8 G/DL — LOW (ref 13–17)
INR BLD: 1.24 — HIGH (ref 0.88–1.16)
INR BLD: 1.26 — HIGH (ref 0.88–1.16)
LACTATE SERPL-SCNC: 1 MMOL/L — SIGNIFICANT CHANGE UP (ref 0.5–2)
MAGNESIUM SERPL-MCNC: 2.3 MG/DL — SIGNIFICANT CHANGE UP (ref 1.6–2.6)
MAGNESIUM SERPL-MCNC: 2.3 MG/DL — SIGNIFICANT CHANGE UP (ref 1.6–2.6)
MCHC RBC-ENTMCNC: 30.1 PG — SIGNIFICANT CHANGE UP (ref 27–34)
MCHC RBC-ENTMCNC: 30.2 PG — SIGNIFICANT CHANGE UP (ref 27–34)
MCHC RBC-ENTMCNC: 34.7 G/DL — SIGNIFICANT CHANGE UP (ref 32–36)
MCHC RBC-ENTMCNC: 34.9 G/DL — SIGNIFICANT CHANGE UP (ref 32–36)
MCV RBC AUTO: 86.6 FL — SIGNIFICANT CHANGE UP (ref 80–100)
MCV RBC AUTO: 86.7 FL — SIGNIFICANT CHANGE UP (ref 80–100)
PHOSPHATE SERPL-MCNC: 3.9 MG/DL — SIGNIFICANT CHANGE UP (ref 2.5–4.5)
PHOSPHATE SERPL-MCNC: 4.6 MG/DL — HIGH (ref 2.5–4.5)
PLATELET # BLD AUTO: 129 K/UL — LOW (ref 150–400)
PLATELET # BLD AUTO: 154 K/UL — SIGNIFICANT CHANGE UP (ref 150–400)
POTASSIUM SERPL-MCNC: 3.9 MMOL/L — SIGNIFICANT CHANGE UP (ref 3.5–5.3)
POTASSIUM SERPL-MCNC: 3.9 MMOL/L — SIGNIFICANT CHANGE UP (ref 3.5–5.3)
POTASSIUM SERPL-MCNC: 4.1 MMOL/L — SIGNIFICANT CHANGE UP (ref 3.5–5.3)
POTASSIUM SERPL-SCNC: 3.9 MMOL/L — SIGNIFICANT CHANGE UP (ref 3.5–5.3)
POTASSIUM SERPL-SCNC: 3.9 MMOL/L — SIGNIFICANT CHANGE UP (ref 3.5–5.3)
POTASSIUM SERPL-SCNC: 4.1 MMOL/L — SIGNIFICANT CHANGE UP (ref 3.5–5.3)
PROT SERPL-MCNC: 5.3 G/DL — LOW (ref 6–8.3)
PROT SERPL-MCNC: 5.6 G/DL — LOW (ref 6–8.3)
PROT SERPL-MCNC: 5.7 G/DL — LOW (ref 6–8.3)
PROTHROM AB SERPL-ACNC: 13.8 SEC — HIGH (ref 9.8–12.7)
PROTHROM AB SERPL-ACNC: 14 SEC — HIGH (ref 9.8–12.7)
RBC # BLD: 3.06 M/UL — LOW (ref 4.2–5.8)
RBC # BLD: 3.24 M/UL — LOW (ref 4.2–5.8)
RBC # FLD: 15.6 % — SIGNIFICANT CHANGE UP (ref 10.3–16.9)
RBC # FLD: 15.7 % — SIGNIFICANT CHANGE UP (ref 10.3–16.9)
RH IG SCN BLD-IMP: POSITIVE — SIGNIFICANT CHANGE UP
SODIUM SERPL-SCNC: 135 MMOL/L — SIGNIFICANT CHANGE UP (ref 135–145)
SODIUM SERPL-SCNC: 135 MMOL/L — SIGNIFICANT CHANGE UP (ref 135–145)
SODIUM SERPL-SCNC: 136 MMOL/L — SIGNIFICANT CHANGE UP (ref 135–145)
WBC # BLD: 14.7 K/UL — HIGH (ref 3.8–10.5)
WBC # BLD: 15.1 K/UL — HIGH (ref 3.8–10.5)
WBC # FLD AUTO: 14.7 K/UL — HIGH (ref 3.8–10.5)
WBC # FLD AUTO: 15.1 K/UL — HIGH (ref 3.8–10.5)

## 2017-10-06 PROCEDURE — 71010: CPT | Mod: 26

## 2017-10-06 PROCEDURE — 36558 INSERT TUNNELED CV CATH: CPT | Mod: RT

## 2017-10-06 PROCEDURE — 76937 US GUIDE VASCULAR ACCESS: CPT | Mod: 26

## 2017-10-06 PROCEDURE — 77001 FLUOROGUIDE FOR VEIN DEVICE: CPT | Mod: 26

## 2017-10-06 PROCEDURE — 99291 CRITICAL CARE FIRST HOUR: CPT

## 2017-10-06 PROCEDURE — 93306 TTE W/DOPPLER COMPLETE: CPT | Mod: 26

## 2017-10-06 PROCEDURE — 99232 SBSQ HOSP IP/OBS MODERATE 35: CPT | Mod: GC

## 2017-10-06 RX ORDER — TUBERCULIN PURIFIED PROTEIN DERIVATIVE 5 [IU]/.1ML
5 INJECTION, SOLUTION INTRADERMAL ONCE
Qty: 0 | Refills: 0 | Status: COMPLETED | OUTPATIENT
Start: 2017-10-06 | End: 2017-10-06

## 2017-10-06 RX ORDER — ALBUMIN HUMAN 25 %
50 VIAL (ML) INTRAVENOUS ONCE
Qty: 0 | Refills: 0 | Status: COMPLETED | OUTPATIENT
Start: 2017-10-06 | End: 2017-10-06

## 2017-10-06 RX ORDER — PANTOPRAZOLE SODIUM 20 MG/1
40 TABLET, DELAYED RELEASE ORAL
Qty: 0 | Refills: 0 | Status: DISCONTINUED | OUTPATIENT
Start: 2017-10-06 | End: 2017-10-13

## 2017-10-06 RX ADMIN — Medication 2: at 21:34

## 2017-10-06 RX ADMIN — CLOPIDOGREL BISULFATE 75 MILLIGRAM(S): 75 TABLET, FILM COATED ORAL at 13:36

## 2017-10-06 RX ADMIN — OXYCODONE AND ACETAMINOPHEN 1 TABLET(S): 5; 325 TABLET ORAL at 08:13

## 2017-10-06 RX ADMIN — PANTOPRAZOLE SODIUM 40 MILLIGRAM(S): 20 TABLET, DELAYED RELEASE ORAL at 16:12

## 2017-10-06 RX ADMIN — Medication 2: at 13:36

## 2017-10-06 RX ADMIN — OXYCODONE AND ACETAMINOPHEN 1 TABLET(S): 5; 325 TABLET ORAL at 22:35

## 2017-10-06 RX ADMIN — TUBERCULIN PURIFIED PROTEIN DERIVATIVE 5 UNIT(S): 5 INJECTION, SOLUTION INTRADERMAL at 14:15

## 2017-10-06 RX ADMIN — OXYCODONE AND ACETAMINOPHEN 1 TABLET(S): 5; 325 TABLET ORAL at 07:24

## 2017-10-06 RX ADMIN — Medication 2: at 16:12

## 2017-10-06 RX ADMIN — Medication 81 MILLIGRAM(S): at 13:36

## 2017-10-06 RX ADMIN — HEPARIN SODIUM 5000 UNIT(S): 5000 INJECTION INTRAVENOUS; SUBCUTANEOUS at 21:35

## 2017-10-06 RX ADMIN — Medication 50 MILLILITER(S): at 08:36

## 2017-10-06 RX ADMIN — HEPARIN SODIUM 5000 UNIT(S): 5000 INJECTION INTRAVENOUS; SUBCUTANEOUS at 13:39

## 2017-10-06 RX ADMIN — OXYCODONE AND ACETAMINOPHEN 1 TABLET(S): 5; 325 TABLET ORAL at 21:35

## 2017-10-06 RX ADMIN — ATORVASTATIN CALCIUM 40 MILLIGRAM(S): 80 TABLET, FILM COATED ORAL at 21:35

## 2017-10-06 RX ADMIN — HEPARIN SODIUM 5000 UNIT(S): 5000 INJECTION INTRAVENOUS; SUBCUTANEOUS at 07:24

## 2017-10-06 NOTE — PROGRESS NOTE ADULT - SUBJECTIVE AND OBJECTIVE BOX
PMH :  CHFCAD  CHF; CAD  Unknown h/o HF  H/o or current diagnosis of HF- Contraindication to ACEI/ARBs  No pertinent family history in first degree relatives  Handoff  MEWS Score  Prostatic cancer  Diabetes mellitus  HTN (hypertension)  3-vessel CAD  3-vessel CAD  Anemia  HTN (hypertension)  Coronary artery disease  Hyponatremia  Acute renal failure  CABG using saphenous vein graft  No significant past surgical history    ROS  All other systems reviewed and negative.    ICU Vital Signs Last 24 Hrs  T(C): 36.1 (10-06-17 @ 18:11), Max: 36.9 (10-06-17 @ 01:15)  T(F): 96.9 (10-06-17 @ 18:11), Max: 98.5 (10-06-17 @ 01:15)  HR: 76 (10-06-17 @ 18:00) (72 - 90)  BP: --  BP(mean): --  ABP: 112/42 (10-06-17 @ 18:00) (98/54 - 132/56)  ABP(mean): 60 (10-06-17 @ 18:00) (54 - 80)  RR: 10 (10-06-17 @ 18:00) (7 - 30)  SpO2: 95% (10-06-17 @ 18:00) (92% - 100%)    I&O's Summary    05 Oct 2017 07:01  -  06 Oct 2017 07:00  --------------------------------------------------------  IN: 1414.7 mL / OUT: 2713 mL / NET: -1298.3 mL    06 Oct 2017 07:01  -  06 Oct 2017 18:52  --------------------------------------------------------  IN: 123 mL / OUT: 250 mL / NET: -127 mL        ABG - ( 06 Oct 2017 13:32 )  pH: 7.47  /  pCO2: 28    /  pO2: 118   / HCO3: 20    / Base Excess: -2.7  /  SaO2: 99                                      9.2    15.1  )-----------( 129      ( 06 Oct 2017 13:22 )             26.5     06 Oct 2017 13:22    136    |  99     |  39     ----------------------------<  170    4.1     |  21     |  2.57     Ca    8.1        06 Oct 2017 13:22  Phos  3.9       06 Oct 2017 13:22  Mg     2.3       06 Oct 2017 13:22    TPro  5.3    /  Alb  3.0    /  TBili  0.9    /  DBili  x      /  AST  10     /  ALT  <5     /  AlkPhos  62     06 Oct 2017 13:22    PT/INR - ( 06 Oct 2017 13:22 )   PT: 14.0 sec;   INR: 1.26          PTT - ( 06 Oct 2017 13:22 )  PTT:96.7 sec  MEDICATIONS  (STANDING):  aspirin enteric coated 81 milliGRAM(s) Oral daily  atorvastatin 40 milliGRAM(s) Oral at bedtime  clopidogrel Tablet 75 milliGRAM(s) Oral daily  dextrose 5%. 1000 milliLiter(s) IV Continuous <Continuous>  dextrose 50% Injectable 12.5 Gram(s) IV Push once  dextrose 50% Injectable 25 Gram(s) IV Push once  dextrose 50% Injectable 25 Gram(s) IV Push once  docusate sodium 100 milliGRAM(s) Oral three times a day  heparin  Injectable 5000 Unit(s) SubCutaneous every 8 hours  insulin lispro (HumaLOG) corrective regimen sliding scale   SubCutaneous Before meals and at bedtime  norepinephrine Infusion 0.01 MICROgram(s)/kG/Min IV Continuous <Continuous>  pantoprazole    Tablet 40 milliGRAM(s) Oral before breakfast  senna 2 Tablet(s) Oral at bedtime  sodium chloride 0.45%. 1000 milliLiter(s) IV Continuous <Continuous>    PHYSICAL EXAM:  Gen : no acute distress  Neck: No LAD, No JVD  Respiratory: decreased in the bases  Cardiovascular: S1 and S2, RRR, no M/G/R  Gastrointestinal: BS+, soft, NT/ND  Extremities: No peripheral edema  Vascular: 2+ peripheral pulses  Neurological: A/O x 3, no focal deficits  Incision: clean dry/ no sign of infection  Lines: no sign of infection POD #2 s/p OPCAB X3  EF 30%    Overall improving  s/p HD yesterday   critically stable over the course of the day  pressors weaned  ambulated  S/p permacath insertion    PMH :  CHF; CAD  Prostatic cancer  Diabetes mellitus  HTN (hypertension)  Anemia  Coronary artery disease  Hyponatremia  Acute renal failure  CABG using saphenous vein graft    ROS no issues  All other systems reviewed and negative.    ICU Vital Signs Last 24 Hrs  T(C): 36.1 (10-06-17 @ 18:11), Max: 36.9 (10-06-17 @ 01:15)  T(F): 96.9 (10-06-17 @ 18:11), Max: 98.5 (10-06-17 @ 01:15)  HR: 76 (10-06-17 @ 18:00) (72 - 90)  ABP: 112/42 (10-06-17 @ 18:00) (98/54 - 132/56)  ABP(mean): 60 (10-06-17 @ 18:00) (54 - 80)  RR: 10 (10-06-17 @ 18:00) (7 - 30)  SpO2: 95% (10-06-17 @ 18:00) (92% - 100%)    I&O's Summary    05 Oct 2017 07:01  -  06 Oct 2017 07:00  --------------------------------------------------------  IN: 1414.7 mL / OUT: 2713 mL / NET: -1298.3 mL    06 Oct 2017 07:01  -  06 Oct 2017 18:52  --------------------------------------------------------  IN: 123 mL / OUT: 250 mL / NET: -127 mL    ABG - ( 06 Oct 2017 13:32 )  pH: 7.47  /  pCO2: 28    /  pO2: 118   / HCO3: 20    / Base Excess: -2.7  /  SaO2: 99                            9.2    15.1  )-----------( 129      ( 06 Oct 2017 13:22 )             26.5     06 Oct 2017 13:22    136    |  99     |  39     ----------------------------<  170    4.1     |  21     |  2.57     Ca    8.1        06 Oct 2017 13:22  Phos  3.9       06 Oct 2017 13:22  Mg     2.3       06 Oct 2017 13:22    TPro  5.3    /  Alb  3.0    /  TBili  0.9    /  DBili  x      /  AST  10     /  ALT  <5     /  AlkPhos  62     06 Oct 2017 13:22    PT/INR - ( 06 Oct 2017 13:22 )   PT: 14.0 sec;   INR: 1.26     PTT - ( 06 Oct 2017 13:22 )  PTT:96.7 sec    MEDICATIONS  (STANDING):  aspirin enteric coated 81 milliGRAM(s) Oral daily  atorvastatin 40 milliGRAM(s) Oral at bedtime  clopidogrel Tablet 75 milliGRAM(s) Oral dailye  docusate sodium 100 milliGRAM(s) Oral three times a day  heparin  Injectable 5000 Unit(s) SubCutaneous every 8 hours  insulin lispro (HumaLOG) corrective regimen sliding scale   SubCutaneous Before meals and at bedtime  norepinephrine Infusion 0.01 MICROgram(s)/kG/Min IV Continuous <Continuous>  pantoprazole    Tablet 40 milliGRAM(s) Oral before breakfast  senna 2 Tablet(s) Oral at bedtime  sodium chloride 0.45%. 1000 milliLiter(s) IV Continuous <Continuous>    PHYSICAL EXAM:  Gen : no acute distress  Neck: No LAD, No JVD  Respiratory: decreased in the bases  Cardiovascular: S1 and S2, RRR, no M/G/R  Gastrointestinal: BS+, soft, NT/ND  Extremities: No peripheral edema  Vascular: 2+ peripheral pulses  Neurological: A/O x 3, no focal deficits  Incision: clean dry/ no sign of infection  Lines: no sign of infection

## 2017-10-06 NOTE — PROGRESS NOTE ADULT - ASSESSMENT
This is 66 year old male with PMH stated above. Now in th CT ICU - for possible CABG. Started on HD last night for oliguric YOANDY (could be due to the cardiorenal syndrom and/or Contrast induced nephroapthy). He was  started on HD on 10/2 Underwent surgical intervention on 10/4 - s/p CABG. Was dialyzed on 10/5 - 2 liters off. No need for Hd for today

## 2017-10-06 NOTE — PROGRESS NOTE ADULT - PROBLEM SELECTOR PLAN 4
- the patient with acute kidney injury - less likely to be EPO dependant Anemia - we will differ for now EPO   - hemoglobin 9.9
- the patient with acute kidney injury - less likely to be EPO dependant Anemia - we will differ for now EPO   -
- the patient with acute kidney injury - less likely to be EPO dependant Anemia - we will differ for now EPO   - hemoglobin 10.0
- the patient with acute kidney injury - less likely to be EPO dependant Anemia - we will differ for now EPO   - hemoglobin 9.4
- the patient with acute kidney injury - less likely to be EPO dependant Anemia - we will differ for now EPO   - hemoglobin 9.5

## 2017-10-06 NOTE — PROGRESS NOTE ADULT - ASSESSMENT
67yo with h/o HTN, CKD, prostate CA admitted with syncope on workup diagnosed with 3 V CAD, mod MR, and systolic CHF.  Pt now in renal failure (pre-op) started on Renal replacement therapy.  Underwent uncomplicated OPCAB X3, recovering well    Problems  1. s/p CABG X3  2. CKD, renal failure now on HD  3. Post op hemodynamic instability  4. acute on Chronic CHF    Plan  -- ASA/Plavix, Statin.  Pressors weaned today.  continue to follow hemodynamics closely  -- stable on NC.  B/l pleural chest tubes remain due to history of effusions.    -- s/p Permacath today.  Next HD tmr  -- DIet, GI proph  -- completed periop antibiotics  -- pulm toileting  -- acute on chronic  systolic CHF.  will initiate CHF meds over the course of the next few days.      Critical care time spent 45 min

## 2017-10-06 NOTE — PROGRESS NOTE ADULT - SUBJECTIVE AND OBJECTIVE BOX
Objective and subjective   Mr Roldan is sitting in his chair and eating his breakfast. Feels a little short of breath, chest pain - s/p surgery. urine output - 400 in the past 24 hours       He was admitted yesterday from another hospital - where found to have triple vessels disease and worsening of the renal function. Here over the past 24 hours found to have worsening of the kidney fiunction - oliguria and fluid overload. Started on HD - 10/2. LAst HD done on 10/5 - 2 liters off         aspirin enteric coated 81 milliGRAM(s) daily  atorvastatin 40 milliGRAM(s) at bedtime  clopidogrel Tablet 75 milliGRAM(s) daily  dextrose 5%. 1000 milliLiter(s) <Continuous>  dextrose 50% Injectable 12.5 Gram(s) once  dextrose 50% Injectable 25 Gram(s) once  dextrose 50% Injectable 25 Gram(s) once  dextrose Gel 1 Dose(s) once PRN  docusate sodium 100 milliGRAM(s) three times a day  glucagon  Injectable 1 milliGRAM(s) once PRN  heparin  Injectable 5000 Unit(s) every 8 hours  insulin lispro (HumaLOG) corrective regimen sliding scale   Before meals and at bedtime  norepinephrine Infusion 0.01 MICROgram(s)/kG/Min <Continuous>  oxyCODONE    5 mG/acetaminophen 325 mG 1 Tablet(s) every 4 hours PRN  oxyCODONE    5 mG/acetaminophen 325 mG 2 Tablet(s) every 6 hours PRN  pantoprazole    Tablet 40 milliGRAM(s) before breakfast  polyethylene glycol 3350 17 Gram(s) daily PRN  PPD  5 Tuberculin Unit(s) Injectable 5 Unit(s) once  senna 2 Tablet(s) at bedtime  sodium chloride 0.45%. 1000 milliLiter(s) <Continuous>      Allergies    No Known Allergies    Intolerances        T(C): , Max: 36.9 (10-06-17 @ 01:15)  T(F): , Max: 98.5 (10-06-17 @ 01:15)  HR: 72 (10-06-17 @ 09:00)  BP: 116/69 (10-05-17 @ 15:44)  BP(mean): 82 (10-05-17 @ 15:44)  RR: 16 (10-06-17 @ 09:00)  SpO2: 96% (10-06-17 @ 09:00)  Wt(kg): --    10-05 @ 07:01  -  10-06 @ 07:00  --------------------------------------------------------  IN:    Albumin 5%  - 250 mL: 250 mL    IV PiggyBack: 300 mL    norepinephrine Infusion: 84.7 mL    Oral Fluid: 240 mL    Packed Red Blood Cells: 300 mL    sodium chloride 0.45%.: 240 mL  Total IN: 1414.7 mL    OUT:    Chest Tube: 50 mL    Chest Tube: 120 mL    Chest Tube: 130 mL    Indwelling Catheter - Urethral: 413 mL    Other: 2000 mL  Total OUT: 2713 mL    Total NET: -1298.3 mL      10-06 @ 07:01  -  10-06 @ 10:03  --------------------------------------------------------  IN:    norepinephrine Infusion: 1.5 mL    sodium chloride 0.45%.: 20 mL  Total IN: 21.5 mL    OUT:    Indwelling Catheter - Urethral: 10 mL  Total OUT: 10 mL    Total NET: 11.5 mL        Physical exam:   Alert and awake   HAs a HD catheter on the right IJ   Not in acute respiratory distress on nasal cannula   Normal air entry in the lungs, no wheezing, no crackles, decreased breath sounds on the right base, has a chest tube on the left  RRR, normal s1/s2, no murmurs, rubs or gallops   Abdomen soft, non-tender, normal bowel sounds   Mild peripheral edema - chronic changes over the lower extremities   Mild urinary output in the bag   HAs drainage placed in his chest s/p thoracotomy       LABS:                        9.8    14.7  )-----------( 154      ( 06 Oct 2017 03:48 )             28.1     10-06    135  |  96  |  38<H>  ----------------------------<  163<H>  3.9   |  24  |  2.64<H>    Ca    8.9      06 Oct 2017 03:48  Phos  4.6     10-06  Mg     2.3     10-06    TPro  5.7<L>  /  Alb  3.3  /  TBili  1.1  /  DBili  x   /  AST  14  /  ALT  6<L>  /  AlkPhos  73  10-06      PT/INR - ( 06 Oct 2017 03:48 )   PT: 13.8 sec;   INR: 1.24          PTT - ( 06 Oct 2017 03:48 )  PTT:40.6 sec          RADIOLOGY & ADDITIONAL STUDIES:      < from: Xray Chest 1 View AP-PORTABLE IMMEDIATE (10.06.17 @ 06:04) >        INTERPRETATION:  Portable exam    History: chest tube removed. Follow up abnormal exam.    Mediastinum tube removed since prior exam 10/6/17, earlier same day.    No other change.  Left pleural effusion again noted.    Impression:    Left pleural effusion.      < end of copied text >

## 2017-10-06 NOTE — PROGRESS NOTE ADULT - PROBLEM SELECTOR PLAN 1
- most likely due to cardiorenal syndrome and/or contrast induced - oliguric ATN   - was started on HD on 10/2 - two sessions   - last HD done 10/5 - 2 liters off   - placement of permcath is reasonable still oliguric   - has a HD catheter on the right IJ   - still oliguric this morning - 400  - u/s of the bladder and kidneys - noted - no hydronephrosis   - please him on renal diet   - follow in and outs  - electrolytes acceptable K 3.9  - calcium 8.9  phosphate - 4.6

## 2017-10-07 LAB
ANION GAP SERPL CALC-SCNC: 13 MMOL/L — SIGNIFICANT CHANGE UP (ref 5–17)
ANION GAP SERPL CALC-SCNC: 14 MMOL/L — SIGNIFICANT CHANGE UP (ref 5–17)
ANION GAP SERPL CALC-SCNC: 14 MMOL/L — SIGNIFICANT CHANGE UP (ref 5–17)
APTT BLD: 39.6 SEC — HIGH (ref 27.5–37.4)
APTT BLD: 43.6 SEC — HIGH (ref 27.5–37.4)
APTT BLD: 45.6 SEC — HIGH (ref 27.5–37.4)
BUN SERPL-MCNC: 26 MG/DL — HIGH (ref 7–23)
BUN SERPL-MCNC: 48 MG/DL — HIGH (ref 7–23)
BUN SERPL-MCNC: 49 MG/DL — HIGH (ref 7–23)
CALCIUM SERPL-MCNC: 8.5 MG/DL — SIGNIFICANT CHANGE UP (ref 8.4–10.5)
CALCIUM SERPL-MCNC: 8.6 MG/DL — SIGNIFICANT CHANGE UP (ref 8.4–10.5)
CALCIUM SERPL-MCNC: 8.6 MG/DL — SIGNIFICANT CHANGE UP (ref 8.4–10.5)
CHLORIDE SERPL-SCNC: 93 MMOL/L — LOW (ref 96–108)
CHLORIDE SERPL-SCNC: 96 MMOL/L — SIGNIFICANT CHANGE UP (ref 96–108)
CHLORIDE SERPL-SCNC: 98 MMOL/L — SIGNIFICANT CHANGE UP (ref 96–108)
CO2 SERPL-SCNC: 22 MMOL/L — SIGNIFICANT CHANGE UP (ref 22–31)
CO2 SERPL-SCNC: 23 MMOL/L — SIGNIFICANT CHANGE UP (ref 22–31)
CO2 SERPL-SCNC: 25 MMOL/L — SIGNIFICANT CHANGE UP (ref 22–31)
CREAT SERPL-MCNC: 1.63 MG/DL — HIGH (ref 0.5–1.3)
CREAT SERPL-MCNC: 2.77 MG/DL — HIGH (ref 0.5–1.3)
CREAT SERPL-MCNC: 2.84 MG/DL — HIGH (ref 0.5–1.3)
GAS PNL BLDA: SIGNIFICANT CHANGE UP
GAS PNL BLDA: SIGNIFICANT CHANGE UP
GLUCOSE SERPL-MCNC: 151 MG/DL — HIGH (ref 70–99)
GLUCOSE SERPL-MCNC: 198 MG/DL — HIGH (ref 70–99)
GLUCOSE SERPL-MCNC: 285 MG/DL — HIGH (ref 70–99)
HCT VFR BLD CALC: 24.6 % — LOW (ref 39–50)
HCT VFR BLD CALC: 26.4 % — LOW (ref 39–50)
HCT VFR BLD CALC: 31.3 % — LOW (ref 39–50)
HGB BLD-MCNC: 10.6 G/DL — LOW (ref 13–17)
HGB BLD-MCNC: 8.8 G/DL — LOW (ref 13–17)
HGB BLD-MCNC: 9 G/DL — LOW (ref 13–17)
INR BLD: 1.09 — SIGNIFICANT CHANGE UP (ref 0.88–1.16)
INR BLD: 1.15 — SIGNIFICANT CHANGE UP (ref 0.88–1.16)
INR BLD: 1.17 — HIGH (ref 0.88–1.16)
LACTATE SERPL-SCNC: 1.4 MMOL/L — SIGNIFICANT CHANGE UP (ref 0.5–2)
MAGNESIUM SERPL-MCNC: 2.2 MG/DL — SIGNIFICANT CHANGE UP (ref 1.6–2.6)
MAGNESIUM SERPL-MCNC: 2.4 MG/DL — SIGNIFICANT CHANGE UP (ref 1.6–2.6)
MAGNESIUM SERPL-MCNC: 2.5 MG/DL — SIGNIFICANT CHANGE UP (ref 1.6–2.6)
MCHC RBC-ENTMCNC: 29.8 PG — SIGNIFICANT CHANGE UP (ref 27–34)
MCHC RBC-ENTMCNC: 30.1 PG — SIGNIFICANT CHANGE UP (ref 27–34)
MCHC RBC-ENTMCNC: 31.3 PG — SIGNIFICANT CHANGE UP (ref 27–34)
MCHC RBC-ENTMCNC: 33.9 G/DL — SIGNIFICANT CHANGE UP (ref 32–36)
MCHC RBC-ENTMCNC: 34.1 G/DL — SIGNIFICANT CHANGE UP (ref 32–36)
MCHC RBC-ENTMCNC: 35.8 G/DL — SIGNIFICANT CHANGE UP (ref 32–36)
MCV RBC AUTO: 87.4 FL — SIGNIFICANT CHANGE UP (ref 80–100)
MCV RBC AUTO: 87.5 FL — SIGNIFICANT CHANGE UP (ref 80–100)
MCV RBC AUTO: 88.9 FL — SIGNIFICANT CHANGE UP (ref 80–100)
PHOSPHATE SERPL-MCNC: 3.9 MG/DL — SIGNIFICANT CHANGE UP (ref 2.5–4.5)
PLATELET # BLD AUTO: 121 K/UL — LOW (ref 150–400)
PLATELET # BLD AUTO: 128 K/UL — LOW (ref 150–400)
PLATELET # BLD AUTO: 139 K/UL — LOW (ref 150–400)
POTASSIUM SERPL-MCNC: 3.7 MMOL/L — SIGNIFICANT CHANGE UP (ref 3.5–5.3)
POTASSIUM SERPL-MCNC: 4.1 MMOL/L — SIGNIFICANT CHANGE UP (ref 3.5–5.3)
POTASSIUM SERPL-MCNC: 4.3 MMOL/L — SIGNIFICANT CHANGE UP (ref 3.5–5.3)
POTASSIUM SERPL-SCNC: 3.7 MMOL/L — SIGNIFICANT CHANGE UP (ref 3.5–5.3)
POTASSIUM SERPL-SCNC: 4.1 MMOL/L — SIGNIFICANT CHANGE UP (ref 3.5–5.3)
POTASSIUM SERPL-SCNC: 4.3 MMOL/L — SIGNIFICANT CHANGE UP (ref 3.5–5.3)
PROTHROM AB SERPL-ACNC: 12.1 SEC — SIGNIFICANT CHANGE UP (ref 9.8–12.7)
PROTHROM AB SERPL-ACNC: 12.8 SEC — HIGH (ref 9.8–12.7)
PROTHROM AB SERPL-ACNC: 13 SEC — HIGH (ref 9.8–12.7)
RBC # BLD: 2.81 M/UL — LOW (ref 4.2–5.8)
RBC # BLD: 3.02 M/UL — LOW (ref 4.2–5.8)
RBC # BLD: 3.52 M/UL — LOW (ref 4.2–5.8)
RBC # FLD: 14.6 % — SIGNIFICANT CHANGE UP (ref 10.3–16.9)
RBC # FLD: 15.3 % — SIGNIFICANT CHANGE UP (ref 10.3–16.9)
RBC # FLD: 15.5 % — SIGNIFICANT CHANGE UP (ref 10.3–16.9)
SODIUM SERPL-SCNC: 129 MMOL/L — LOW (ref 135–145)
SODIUM SERPL-SCNC: 134 MMOL/L — LOW (ref 135–145)
SODIUM SERPL-SCNC: 135 MMOL/L — SIGNIFICANT CHANGE UP (ref 135–145)
WBC # BLD: 11.7 K/UL — HIGH (ref 3.8–10.5)
WBC # BLD: 13.8 K/UL — HIGH (ref 3.8–10.5)
WBC # BLD: 15.5 K/UL — HIGH (ref 3.8–10.5)
WBC # FLD AUTO: 11.7 K/UL — HIGH (ref 3.8–10.5)
WBC # FLD AUTO: 13.8 K/UL — HIGH (ref 3.8–10.5)
WBC # FLD AUTO: 15.5 K/UL — HIGH (ref 3.8–10.5)

## 2017-10-07 PROCEDURE — 99291 CRITICAL CARE FIRST HOUR: CPT

## 2017-10-07 PROCEDURE — 90935 HEMODIALYSIS ONE EVALUATION: CPT | Mod: GC

## 2017-10-07 PROCEDURE — 71010: CPT | Mod: 26

## 2017-10-07 PROCEDURE — 99292 CRITICAL CARE ADDL 30 MIN: CPT

## 2017-10-07 RX ORDER — ALBUMIN HUMAN 25 %
250 VIAL (ML) INTRAVENOUS ONCE
Qty: 0 | Refills: 0 | Status: COMPLETED | OUTPATIENT
Start: 2017-10-07 | End: 2017-10-07

## 2017-10-07 RX ORDER — ALBUMIN HUMAN 25 %
250 VIAL (ML) INTRAVENOUS ONCE
Qty: 0 | Refills: 0 | Status: DISCONTINUED | OUTPATIENT
Start: 2017-10-07 | End: 2017-10-07

## 2017-10-07 RX ORDER — DEXTROSE 50 % IN WATER 50 %
25 SYRINGE (ML) INTRAVENOUS ONCE
Qty: 0 | Refills: 0 | Status: DISCONTINUED | OUTPATIENT
Start: 2017-10-07 | End: 2017-10-13

## 2017-10-07 RX ORDER — SODIUM CHLORIDE 9 MG/ML
1000 INJECTION, SOLUTION INTRAVENOUS
Qty: 0 | Refills: 0 | Status: DISCONTINUED | OUTPATIENT
Start: 2017-10-07 | End: 2017-10-13

## 2017-10-07 RX ORDER — ALBUMIN HUMAN 25 %
50 VIAL (ML) INTRAVENOUS
Qty: 0 | Refills: 0 | Status: DISCONTINUED | OUTPATIENT
Start: 2017-10-07 | End: 2017-10-07

## 2017-10-07 RX ORDER — MIDODRINE HYDROCHLORIDE 2.5 MG/1
5 TABLET ORAL EVERY 8 HOURS
Qty: 0 | Refills: 0 | Status: DISCONTINUED | OUTPATIENT
Start: 2017-10-07 | End: 2017-10-07

## 2017-10-07 RX ORDER — CALCIUM GLUCONATE 100 MG/ML
2 VIAL (ML) INTRAVENOUS ONCE
Qty: 0 | Refills: 0 | Status: COMPLETED | OUTPATIENT
Start: 2017-10-07 | End: 2017-10-07

## 2017-10-07 RX ORDER — SERTRALINE 25 MG/1
25 TABLET, FILM COATED ORAL DAILY
Qty: 0 | Refills: 0 | Status: DISCONTINUED | OUTPATIENT
Start: 2017-10-07 | End: 2017-10-13

## 2017-10-07 RX ORDER — DEXTROSE 50 % IN WATER 50 %
1 SYRINGE (ML) INTRAVENOUS ONCE
Qty: 0 | Refills: 0 | Status: DISCONTINUED | OUTPATIENT
Start: 2017-10-07 | End: 2017-10-13

## 2017-10-07 RX ORDER — INSULIN LISPRO 100/ML
VIAL (ML) SUBCUTANEOUS
Qty: 0 | Refills: 0 | Status: DISCONTINUED | OUTPATIENT
Start: 2017-10-07 | End: 2017-10-13

## 2017-10-07 RX ORDER — MIDODRINE HYDROCHLORIDE 2.5 MG/1
2.5 TABLET ORAL EVERY 8 HOURS
Qty: 0 | Refills: 0 | Status: DISCONTINUED | OUTPATIENT
Start: 2017-10-07 | End: 2017-10-07

## 2017-10-07 RX ORDER — DEXTROSE 50 % IN WATER 50 %
12.5 SYRINGE (ML) INTRAVENOUS ONCE
Qty: 0 | Refills: 0 | Status: DISCONTINUED | OUTPATIENT
Start: 2017-10-07 | End: 2017-10-13

## 2017-10-07 RX ORDER — VASOPRESSIN 20 [USP'U]/ML
0.02 INJECTION INTRAVENOUS
Qty: 100 | Refills: 0 | Status: DISCONTINUED | OUTPATIENT
Start: 2017-10-07 | End: 2017-10-08

## 2017-10-07 RX ORDER — GLUCAGON INJECTION, SOLUTION 0.5 MG/.1ML
1 INJECTION, SOLUTION SUBCUTANEOUS ONCE
Qty: 0 | Refills: 0 | Status: DISCONTINUED | OUTPATIENT
Start: 2017-10-07 | End: 2017-10-13

## 2017-10-07 RX ADMIN — Medication 125 MILLILITER(S): at 20:33

## 2017-10-07 RX ADMIN — OXYCODONE AND ACETAMINOPHEN 1 TABLET(S): 5; 325 TABLET ORAL at 10:30

## 2017-10-07 RX ADMIN — OXYCODONE AND ACETAMINOPHEN 1 TABLET(S): 5; 325 TABLET ORAL at 19:54

## 2017-10-07 RX ADMIN — HEPARIN SODIUM 5000 UNIT(S): 5000 INJECTION INTRAVENOUS; SUBCUTANEOUS at 22:00

## 2017-10-07 RX ADMIN — Medication 400 GRAM(S): at 21:03

## 2017-10-07 RX ADMIN — OXYCODONE AND ACETAMINOPHEN 1 TABLET(S): 5; 325 TABLET ORAL at 10:45

## 2017-10-07 RX ADMIN — Medication 2: at 22:00

## 2017-10-07 RX ADMIN — MIDODRINE HYDROCHLORIDE 2.5 MILLIGRAM(S): 2.5 TABLET ORAL at 13:44

## 2017-10-07 RX ADMIN — Medication 400 GRAM(S): at 12:16

## 2017-10-07 RX ADMIN — ATORVASTATIN CALCIUM 40 MILLIGRAM(S): 80 TABLET, FILM COATED ORAL at 22:02

## 2017-10-07 RX ADMIN — Medication 4 MILLIGRAM(S): at 13:44

## 2017-10-07 RX ADMIN — PANTOPRAZOLE SODIUM 40 MILLIGRAM(S): 20 TABLET, DELAYED RELEASE ORAL at 06:46

## 2017-10-07 RX ADMIN — Medication: at 11:49

## 2017-10-07 RX ADMIN — Medication 1: at 16:47

## 2017-10-07 RX ADMIN — HEPARIN SODIUM 5000 UNIT(S): 5000 INJECTION INTRAVENOUS; SUBCUTANEOUS at 06:07

## 2017-10-07 RX ADMIN — Medication 1: at 16:46

## 2017-10-07 RX ADMIN — SERTRALINE 25 MILLIGRAM(S): 25 TABLET, FILM COATED ORAL at 13:44

## 2017-10-07 RX ADMIN — OXYCODONE AND ACETAMINOPHEN 1 TABLET(S): 5; 325 TABLET ORAL at 19:57

## 2017-10-07 RX ADMIN — CLOPIDOGREL BISULFATE 75 MILLIGRAM(S): 75 TABLET, FILM COATED ORAL at 10:45

## 2017-10-07 RX ADMIN — HEPARIN SODIUM 5000 UNIT(S): 5000 INJECTION INTRAVENOUS; SUBCUTANEOUS at 14:45

## 2017-10-07 RX ADMIN — Medication 81 MILLIGRAM(S): at 10:44

## 2017-10-07 NOTE — PROGRESS NOTE ADULT - PROBLEM SELECTOR PLAN 1
Oliguric Teresa mel due to recent contrast and cardio-renal disease with probable chance of recovery over weeks.  Patient still remain oliguric and low urine output of 400 cc/day.  Will do HD treatment as still mild SOB and chest xray with CHF/fluid as per ordered.  Avoid nephrotoxic agents  Monitor BMP daily  Strict I/o monitoring

## 2017-10-07 NOTE — PROGRESS NOTE ADULT - ASSESSMENT
This is 66 year old male with PMH stated above. Now in th CT ICU - for possible CABG. Started on HD last night for oliguric YOANDY (could be due to the cardiorenal syndrom and/or Contrast induced nephroapthy). He was  started on HD on 10/2 two session so far. Today still oliguria - 337 Underwent surgical intervention on 10/5 - s/p CABG.  Patient with urine output in past 24 hours of 450 cc at present. Nephrology team for Oliguric YOANDY likely due to contrast and cardio-renal disease.

## 2017-10-07 NOTE — PROGRESS NOTE ADULT - SUBJECTIVE AND OBJECTIVE BOX
Patient was seen and evaluated on dialysis.   Patient is tolerating the procedure well.   HR: 68 (10-07-17 @ 11:59)  BP: 95/58 (10-07-17 @ 11:59)  Continue dialysis:   Dialyzer:  180 Optiflux        QB: 300        QD: 500 3K+  Goal UF 0.5 to 1Kg over 3 Hours     Transfuse PRBC during HD today as per ordered by primary team.

## 2017-10-07 NOTE — PROGRESS NOTE ADULT - SUBJECTIVE AND OBJECTIVE BOX
Patient is a 66y Male seen and evaluated at bedside. Patient feels fine at present. Mild fatigue and tired. Denies any chest pain, shortness of breath at present. Denies any other complaints at present. Able to void urine at present.       albumin human  5% IVPB 250 once  albumin human 25% IVPB 50 every 1 hour  aspirin enteric coated 81 daily  atorvastatin 40 at bedtime  clopidogrel Tablet 75 daily  dextrose 5%. 1000 <Continuous>  dextrose 50% Injectable 12.5 once  dextrose 50% Injectable 25 once  dextrose 50% Injectable 25 once  dextrose Gel 1 once PRN  docusate sodium 100 three times a day  glucagon  Injectable 1 once PRN  heparin  Injectable 5000 every 8 hours  insulin lispro (HumaLOG) corrective regimen sliding scale  Before meals and at bedtime  midodrine 2.5 every 8 hours  norepinephrine Infusion 0.01 <Continuous>  oxyCODONE    5 mG/acetaminophen 325 mG 1 every 4 hours PRN  oxyCODONE    5 mG/acetaminophen 325 mG 2 every 6 hours PRN  pantoprazole    Tablet 40 before breakfast  polyethylene glycol 3350 17 daily PRN  senna 2 at bedtime  sertraline 25 daily  sodium chloride 0.45%. 1000 <Continuous>  testosterone patch 4 mG/24 Hr(s) 4 daily      Allergies    No Known Allergies    Intolerances        T(C): , Max: 37.1 (10-07-17 @ 05:00)  T(F): , Max: 98.7 (10-07-17 @ 05:00)  HR: 68 (10-07-17 @ 11:59)  BP: 95/58 (10-07-17 @ 11:59)  BP(mean): 67 (10-07-17 @ 11:59)  RR: 14 (10-07-17 @ 11:59)  SpO2: 96% (10-07-17 @ 11:59)  Wt(kg): --    10-06 @ 07:01  -  10-07 @ 07:00  --------------------------------------------------------  IN: 343 mL / OUT: 620 mL / NET: -277 mL    10-07 @ 07:01  -  10-07 @ 13:03  --------------------------------------------------------  IN: 10 mL / OUT: 65 mL / NET: -55 mL          Review of Systems:  CONSTITUTIONAL: No fever or chills  EYES: No blurred or double vision.  RESPIRATORY: Mild shortness of breath, Denies any cough or hemoptysis  CARDIOVASCULAR: Mild shortness of breath, Denies any palpitations,  Chest pain   GASTROINTESTINAL: NO abdominal or flank pain, No nausea or vomiting, No diarrhea  GENITOURINARY: No dysuria or urinary burning, No difficulty passing urine, No hematuria  NEUROLOGICAL: No headaches or blurred vision  SKIN: No skin rashes   MUSCULOSKELETAL: No arthralgia, Joint pain, leg edema, No muscle pains      PHYSICAL EXAM:  GENERAL: NAD, well-developed, well nourished, alert, awake, no acute distress at present  HEAD:  Atraumatic, Normocephalic,   EYES: Bilateral conjuctiva and sclera normal   Oral cavity: Oral mucosa dry and pale  NECK: Neck supple, No JVD, RIght Sided permacath and left central line present. No signs of inflammation or bleeding.  CHEST/LUNG: Bilateral decreased breath sounds, bibasilar minimal rales and crepitation+nt, no wheezing  HEART: Regular rate and rhythm. AGUEDA II/VI at LPSB, No gallop, no rub   ABDOMEN: Soft, Nontender, BS+nt, No flank tenderness.   EXTREMITIES: No clubbing, cyanosis, or edema  Neurology: AAOx3, no focal neurological deficit  SKIN: No rashes or lesions          ACCESS:     LABS:                        9.0    15.5  )-----------( 139      ( 07 Oct 2017 11:36 )             26.4     10-07    129<L>  |  93<L>  |  48<H>  ----------------------------<  285<H>  4.3   |  22  |  2.84<H>    Ca    8.6      07 Oct 2017 11:36  Phos  3.9     10-07  Mg     2.5     10-07    TPro  5.3<L>  /  Alb  3.0<L>  /  TBili  0.9  /  DBili  x   /  AST  10  /  ALT  <5<L>  /  AlkPhos  62  10-06      PT/INR - ( 07 Oct 2017 11:36 )   PT: 12.1 sec;   INR: 1.09          PTT - ( 07 Oct 2017 11:36 )  PTT:39.6 sec          RADIOLOGY & ADDITIONAL STUDIES:  < from: Xray Chest 1 View AP -PORTABLE-Routine (10.07.17 @ 03:31) >    EXAM:  XR CHEST 1 VIEW PORT ROUTINE                          PROCEDURE DATE:  10/07/2017                     INTERPRETATION:  Clinical History: This of breath    Portable examination the chest demonstrates should be status post   sternotomy. No interval change position remaining support devices in   comparison to prior examination of the chest 10/6/2017. I congestion   and/or infiltrates. Left effusion.    Impression: Congestive change and/or infiltrates. Left effusion            "Thank you forthe opportunity to participate in the care of this   patient."        JUVENAL ESTRADA M.D., ATTENDING RADIOLOGIST  This document has been electronically signed. Oct  7 2017 10:05AM                  < end of copied text >

## 2017-10-07 NOTE — PROGRESS NOTE ADULT - SUBJECTIVE AND OBJECTIVE BOX
CTICU  CRITICAL  CARE  attending     Hand off received 					   Pertinent clinical, laboratory, radiographic, hemodynamic, echocardiographic, respiratory data, microbiologic data and chart were reviewed and analyzed frequently throughout the course of the day and night  Patient seen and examined with CTS/ SH attending at bedside  Pt is a 66y , Male, HEALTH ISSUES - PROBLEM Dx:  Anemia: Anemia  HTN (hypertension): HTN (hypertension)  Coronary artery disease: Coronary artery disease  Hyponatremia: Hyponatremia  Acute renal failure: Acute renal failure      , FAMILY HISTORY:  No pertinent family history in first degree relatives  PAST MEDICAL & SURGICAL HISTORY:  Prostatic cancer  Diabetes mellitus  HTN (hypertension)  No significant past surgical history    Patient is a 66y old  Male who presents with a chief complaint of CAD (30 Sep 2017 17:19)      14 system review was unremarkable  acute changes include acute respiratory failure  Vital signs, hemodynamic and respiratory parameters were reviewed from the bedside nursing flowsheet.  ICU Vital Signs Last 24 Hrs  T(C): 36.2 (07 Oct 2017 15:01), Max: 37.1 (07 Oct 2017 05:00)  T(F): 97.1 (07 Oct 2017 15:01), Max: 98.7 (07 Oct 2017 05:00)  HR: 68 (07 Oct 2017 17:00) (68 - 84)  BP: 95/58 (07 Oct 2017 11:59) (95/58 - 96/55)  BP(mean): 67 (07 Oct 2017 11:59) (64 - 67)  ABP: 102/42 (07 Oct 2017 17:00) (92/52 - 118/56)  ABP(mean): 60 (07 Oct 2017 17:00) (54 - 76)  RR: 12 (07 Oct 2017 17:00) (9 - 19)  SpO2: 96% (07 Oct 2017 17:00) (94% - 98%)    Adult Advanced Hemodynamics Last 24 Hrs  CVP(mm Hg): --  CVP(cm H2O): --  CO: --  CI: --  PA: --  PA(mean): --  PCWP: --  SVR: --  SVRI: --  PVR: --  PVRI: --, ABG - ( 07 Oct 2017 16:22 )  pH: 7.51  /  pCO2: 34    /  pO2: 77    / HCO3: 26    / Base Excess: 3.8   /  SaO2: 96                  Intake and output was reviewed and the fluid balance was calculated  Daily     Daily Weight in k.3 (07 Oct 2017 15:01)  I&O's Summary    06 Oct 2017 07:01  -  07 Oct 2017 07:00  --------------------------------------------------------  IN: 343 mL / OUT: 620 mL / NET: -277 mL    07 Oct 2017 07:01  -  07 Oct 2017 17:55  --------------------------------------------------------  IN: 610 mL / OUT: 630 mL / NET: -20 mL        All lines and drain sites were assessed  Glycemic trend was reviewedCAPILLARY BLOOD GLUCOSE  171 (07 Oct 2017 16:00)        No acute change in mental status  Auscultation of the chest reveals equal bs  Abdomen is soft  Extremities are warm and well perfused  Wounds appear clean and unremarkable  Antibiotics are periop    labs  CBC Full  -  ( 07 Oct 2017 16:24 )  WBC Count : 13.8 K/uL  Hemoglobin : 10.6 g/dL  Hematocrit : 31.3 %  Platelet Count - Automated : 128 K/uL  Mean Cell Volume : 88.9 fL  Mean Cell Hemoglobin : 30.1 pg  Mean Cell Hemoglobin Concentration : 33.9 g/dL  Auto Neutrophil # : x  Auto Lymphocyte # : x  Auto Monocyte # : x  Auto Eosinophil # : x  Auto Basophil # : x  Auto Neutrophil % : x  Auto Lymphocyte % : x  Auto Monocyte % : x  Auto Eosinophil % : x  Auto Basophil % : x    10    135  |  96  |  26<H>  ----------------------------<  198<H>  3.7   |  25  |  1.63<H>    Ca    8.5      07 Oct 2017 16:24  Phos  3.9     10-  Mg     2.2     10-    TPro  5.3<L>  /  Alb  3.0<L>  /  TBili  0.9  /  DBili  x   /  AST  10  /  ALT  <5<L>  /  AlkPhos  62  10-    PT/INR - ( 07 Oct 2017 16:24 )   PT: 13.0 sec;   INR: 1.17          PTT - ( 07 Oct 2017 16:24 )  PTT:45.6 sec  The current medications were reviewed   MEDICATIONS  (STANDING):  albumin human  5% IVPB 250 milliLiter(s) IV Intermittent once  albumin human  5% IVPB 250 milliLiter(s) IV Intermittent once  albumin human 25% IVPB 50 milliLiter(s) IV Intermittent every 1 hour  aspirin enteric coated 81 milliGRAM(s) Oral daily  atorvastatin 40 milliGRAM(s) Oral at bedtime  calcium gluconate IVPB 2 Gram(s) IV Intermittent once  clopidogrel Tablet 75 milliGRAM(s) Oral daily  dextrose 5%. 1000 milliLiter(s) (50 mL/Hr) IV Continuous <Continuous>  dextrose 50% Injectable 12.5 Gram(s) IV Push once  dextrose 50% Injectable 25 Gram(s) IV Push once  dextrose 50% Injectable 25 Gram(s) IV Push once  heparin  Injectable 5000 Unit(s) SubCutaneous every 8 hours  insulin lispro (HumaLOG) corrective regimen sliding scale   SubCutaneous Before meals and at bedtime  midodrine 5 milliGRAM(s) Oral every 8 hours  norepinephrine Infusion 0.01 MICROgram(s)/kG/Min (1.53 mL/Hr) IV Continuous <Continuous>  pantoprazole    Tablet 40 milliGRAM(s) Oral before breakfast  senna 2 Tablet(s) Oral at bedtime  sertraline 25 milliGRAM(s) Oral daily  sodium chloride 0.45%. 1000 milliLiter(s) (10 mL/Hr) IV Continuous <Continuous>  testosterone patch 4 mG/24 Hr(s) 4 milliGRAM(s) Transdermal daily  vasopressin Infusion 0.017 Unit(s)/Min (1 mL/Hr) IV Continuous <Continuous>    MEDICATIONS  (PRN):  dextrose Gel 1 Dose(s) Oral once PRN Blood Glucose LESS THAN 70 milliGRAM(s)/deciliter  glucagon  Injectable 1 milliGRAM(s) IntraMuscular once PRN Glucose LESS THAN 70 milligrams/deciliter  oxyCODONE    5 mG/acetaminophen 325 mG 1 Tablet(s) Oral every 4 hours PRN Moderate Pain (4 - 6)  oxyCODONE    5 mG/acetaminophen 325 mG 2 Tablet(s) Oral every 6 hours PRN Severe Pain (7 - 10)  polyethylene glycol 3350 17 Gram(s) Oral daily PRN Constipation       PROBLEM LIST/ ASSESSMENT:  HEALTH ISSUES - PROBLEM Dx:  Anemia: Anemia  HTN (hypertension): HTN (hypertension)  Coronary artery disease: Coronary artery disease  Hyponatremia: Hyponatremia  Acute renal failure: Acute renal failure      ,   Patient is a 66y old  Male who presents with a chief complaint of CAD (30 Sep 2017 17:19)     s/p cabg  acute changes include acute respiratory failure    My plan includes :  close hemodynamic, ventilatory and drain monitoring and management per post op routine    Monitor for arrhythmias and monitor parameters for organ perfusion  monitor neurologic status  Head of the bed should remain elevated to 45 deg .   chest PT and IS will be encouraged  monitor adequacy of oxygenation and ventilation and attempt to wean oxygen  Nutritional goals will be met using po eventually , ensure adequate caloric intake and montior the same  Stress ulcer and VTE prophylaxis will be achieved    Glycemic control is satisfactory  Electrolytes have been repleted as necessary and wound care has been carried out. Pain control has been achieved.   agressive physical therapy and early mobility and ambulation goals will be met   The family was updated about the course and plan  CRITICAL CARE TIME SPENT in evaluation and management, reassessments, review and interpretation of labs and x-rays, ventilator and hemodynamic management, formulating a plan and coordinating care: ___90____ MIN.  Time does not include procedural time.  CTICU ATTENDING     					    Romie Akbar MD

## 2017-10-08 LAB
ANION GAP SERPL CALC-SCNC: 14 MMOL/L — SIGNIFICANT CHANGE UP (ref 5–17)
APTT BLD: 39.8 SEC — HIGH (ref 27.5–37.4)
BUN SERPL-MCNC: 32 MG/DL — HIGH (ref 7–23)
CALCIUM SERPL-MCNC: 8.8 MG/DL — SIGNIFICANT CHANGE UP (ref 8.4–10.5)
CHLORIDE SERPL-SCNC: 97 MMOL/L — SIGNIFICANT CHANGE UP (ref 96–108)
CO2 SERPL-SCNC: 24 MMOL/L — SIGNIFICANT CHANGE UP (ref 22–31)
CREAT SERPL-MCNC: 2.11 MG/DL — HIGH (ref 0.5–1.3)
GAS PNL BLDA: SIGNIFICANT CHANGE UP
GLUCOSE SERPL-MCNC: 159 MG/DL — HIGH (ref 70–99)
HCT VFR BLD CALC: 27.6 % — LOW (ref 39–50)
HGB BLD-MCNC: 9.6 G/DL — LOW (ref 13–17)
INR BLD: 1.16 — SIGNIFICANT CHANGE UP (ref 0.88–1.16)
MAGNESIUM SERPL-MCNC: 2.3 MG/DL — SIGNIFICANT CHANGE UP (ref 1.6–2.6)
MCHC RBC-ENTMCNC: 30.6 PG — SIGNIFICANT CHANGE UP (ref 27–34)
MCHC RBC-ENTMCNC: 34.8 G/DL — SIGNIFICANT CHANGE UP (ref 32–36)
MCV RBC AUTO: 87.9 FL — SIGNIFICANT CHANGE UP (ref 80–100)
PHOSPHATE SERPL-MCNC: 3 MG/DL — SIGNIFICANT CHANGE UP (ref 2.5–4.5)
PLATELET # BLD AUTO: 130 K/UL — LOW (ref 150–400)
POTASSIUM SERPL-MCNC: 4 MMOL/L — SIGNIFICANT CHANGE UP (ref 3.5–5.3)
POTASSIUM SERPL-SCNC: 4 MMOL/L — SIGNIFICANT CHANGE UP (ref 3.5–5.3)
PROTHROM AB SERPL-ACNC: 12.9 SEC — HIGH (ref 9.8–12.7)
RBC # BLD: 3.14 M/UL — LOW (ref 4.2–5.8)
RBC # FLD: 15 % — SIGNIFICANT CHANGE UP (ref 10.3–16.9)
SODIUM SERPL-SCNC: 135 MMOL/L — SIGNIFICANT CHANGE UP (ref 135–145)
WBC # BLD: 10.8 K/UL — HIGH (ref 3.8–10.5)
WBC # FLD AUTO: 10.8 K/UL — HIGH (ref 3.8–10.5)

## 2017-10-08 PROCEDURE — 99232 SBSQ HOSP IP/OBS MODERATE 35: CPT | Mod: GC

## 2017-10-08 PROCEDURE — 71010: CPT | Mod: 26

## 2017-10-08 PROCEDURE — 99292 CRITICAL CARE ADDL 30 MIN: CPT

## 2017-10-08 PROCEDURE — 93010 ELECTROCARDIOGRAM REPORT: CPT

## 2017-10-08 PROCEDURE — 99291 CRITICAL CARE FIRST HOUR: CPT

## 2017-10-08 RX ADMIN — Medication 4 MILLIGRAM(S): at 11:28

## 2017-10-08 RX ADMIN — Medication 2: at 17:09

## 2017-10-08 RX ADMIN — HEPARIN SODIUM 5000 UNIT(S): 5000 INJECTION INTRAVENOUS; SUBCUTANEOUS at 06:33

## 2017-10-08 RX ADMIN — ATORVASTATIN CALCIUM 40 MILLIGRAM(S): 80 TABLET, FILM COATED ORAL at 21:48

## 2017-10-08 RX ADMIN — Medication 81 MILLIGRAM(S): at 11:28

## 2017-10-08 RX ADMIN — Medication 4: at 22:00

## 2017-10-08 RX ADMIN — CLOPIDOGREL BISULFATE 75 MILLIGRAM(S): 75 TABLET, FILM COATED ORAL at 11:28

## 2017-10-08 RX ADMIN — SERTRALINE 25 MILLIGRAM(S): 25 TABLET, FILM COATED ORAL at 11:28

## 2017-10-08 RX ADMIN — Medication 4 MILLIGRAM(S): at 11:23

## 2017-10-08 RX ADMIN — Medication 2: at 07:08

## 2017-10-08 RX ADMIN — Medication 4: at 12:06

## 2017-10-08 RX ADMIN — HEPARIN SODIUM 5000 UNIT(S): 5000 INJECTION INTRAVENOUS; SUBCUTANEOUS at 14:46

## 2017-10-08 RX ADMIN — PANTOPRAZOLE SODIUM 40 MILLIGRAM(S): 20 TABLET, DELAYED RELEASE ORAL at 06:33

## 2017-10-08 RX ADMIN — TUBERCULIN PURIFIED PROTEIN DERIVATIVE 5 UNIT(S): 5 INJECTION, SOLUTION INTRADERMAL at 14:01

## 2017-10-08 RX ADMIN — HEPARIN SODIUM 5000 UNIT(S): 5000 INJECTION INTRAVENOUS; SUBCUTANEOUS at 22:00

## 2017-10-08 NOTE — PROGRESS NOTE ADULT - SUBJECTIVE AND OBJECTIVE BOX
Patient is a 66y Male seen and evaluated at bedside. Patient lying in bed in no acute distress at present. Denies any chest pain, shortness of breath, palpitations at present. Interval removal of miranda's catheter with minimal urine output since yesterday. Patient had HD treatment yesterday with UF 0.5 L and received 2 Units of PRBC during HD treatment.      aspirin enteric coated 81 daily  atorvastatin 40 at bedtime  clopidogrel Tablet 75 daily  dextrose 5%. 1000 <Continuous>  dextrose 50% Injectable 12.5 once  dextrose 50% Injectable 25 once  dextrose 50% Injectable 25 once  dextrose Gel 1 once PRN  glucagon  Injectable 1 once PRN  heparin  Injectable 5000 every 8 hours  insulin lispro (HumaLOG) corrective regimen sliding scale  Before meals and at bedtime  oxyCODONE    5 mG/acetaminophen 325 mG 1 every 4 hours PRN  oxyCODONE    5 mG/acetaminophen 325 mG 2 every 6 hours PRN  pantoprazole    Tablet 40 before breakfast  sertraline 25 daily  sodium chloride 0.45%. 1000 <Continuous>  testosterone patch 4 mG/24 Hr(s) 4 daily  vasopressin Infusion 0.017 <Continuous>      Allergies    No Known Allergies    Intolerances        T(C): , Max: 36.6 (10-08-17 @ 01:00)  T(F): , Max: 97.9 (10-08-17 @ 01:00)  HR: 76 (10-08-17 @ 13:00)  BP: 101/60 (10-08-17 @ 13:00)  BP(mean): 70 (10-08-17 @ 13:00)  RR: 23 (10-08-17 @ 13:00)  SpO2: 96% (10-08-17 @ 13:00)  Wt(kg): --    10-07 @ 07:01  -  10-08 @ 07:00  --------------------------------------------------------  IN: 1074 mL / OUT: 630 mL / NET: 444 mL    10-08 @ 07:01  -  10-08 @ 13:45  --------------------------------------------------------  IN: 325 mL / OUT: 100 mL / NET: 225 mL          Review of Systems:  CONSTITUTIONAL: No fever or chills, No fatigue or tiredness.  EYES: No blurred or double vision.  RESPIRATORY: No shortness of breath, cough, hemoptysis  CARDIOVASCULAR: No Chest pain or shortness of breath  GASTROINTESTINAL: NO abdominal or flank pain, No nausea or vomiting, No diarrhea  GENITOURINARY: No dysuria or urinary burning, No difficulty passing urine, No hematuria  NEUROLOGICAL: No headaches or blurred vision  SKIN: No skin rashes   MUSCULOSKELETAL: No arthralgia, Joint pain, leg edema, No muscle pains      PHYSICAL EXAM:  GENERAL: NAD, well-developed, well nourished, alert, awake, no acute distress at present  HEAD:  Atraumatic, Normocephalic,   EYES: Bilateral conjuctiva and sclera normal   Oral cavity: Oral mucosa dry and pale  NECK: Neck supple, No JVD  CHEST/LUNG: Clear to auscultation bilaterally; No wheeze, no rales, no crepitations  HEART: Regular rate and rhythm. AGUEDA II/VI at LPSB, No gallop, no rub   ABDOMEN: Soft, Nontender, BS+nt, No flank tenderness.   EXTREMITIES: No clubbing, cyanosis, or edema  Neurology: AAOx3, no focal neurological deficit  SKIN: No rashes or lesions          ACCESS: Right IJ permacath present. No bleeding or inflammation    LABS:                        9.6    10.8  )-----------( 130      ( 08 Oct 2017 03:05 )             27.6     10-08    135  |  97  |  32<H>  ----------------------------<  159<H>  4.0   |  24  |  2.11<H>    Ca    8.8      08 Oct 2017 03:05  Phos  3.0     10-08  Mg     2.3     10-08        PT/INR - ( 08 Oct 2017 03:05 )   PT: 12.9 sec;   INR: 1.16          PTT - ( 08 Oct 2017 03:05 )  PTT:39.8 sec          RADIOLOGY & ADDITIONAL STUDIES:    < from: Xray Chest 1 View AP -PORTABLE-Routine (10.08.17 @ 03:53) >    EXAM:  XR CHEST 1 VIEW PORT ROUTINE                          PROCEDURE DATE:  10/08/2017                     INTERPRETATION:  Clinical History: Shortness of breath    Portable examination of the chest demonstrates cardiomegaly. Congestion   and/or infiltrates. Left effusion. Patient status post sternotomy. No   interval change position remaining support devices in comparison to prior   examination of the chest 10/7/2017.    Impression: Mild congestion and/or infiltrates. Left effusion            "Thank you for the opportunity to participate in the care of this   patient."        JUVENAL ESTRADA M.D., ATTENDING RADIOLOGIST  This document has been electronically signed. Oct  8 2017 10:10AM                  < end of copied text >

## 2017-10-08 NOTE — PROGRESS NOTE ADULT - ASSESSMENT
This is 66 year old male with multiple comorbid condition underwent CABG on 10/05. Nephrology consult called for Oliguric YOANDY needing HD treatment. Patient last dialyzed on 10/07/2017 with UF of 0.5 L. Received 2 Units of PRBC yesterday. No significant urine output since yesterday.

## 2017-10-08 NOTE — PROGRESS NOTE ADULT - SUBJECTIVE AND OBJECTIVE BOX
CTICU  CRITICAL  CARE  attending     Hand off received 					   Pertinent clinical, laboratory, radiographic, hemodynamic, echocardiographic, respiratory data, microbiologic data and chart were reviewed and analyzed frequently throughout the course of the day and night  Patient seen and examined with CTS/ SH attending at bedside    Pt is a 66y , Male, day # 4 s/p Off pump CABG x 3V;  post op EF reportedly 30%    acute on chronic renal failure on hemodialysis    today:    hypotensive  anemia  fluid overload ( had HD yesterday; couldn't do ultra filtration 2/2 hypotension)      Anemia: Anemia  HTN (hypertension): HTN (hypertension)  Coronary artery disease: Coronary artery disease  Hyponatremia: Hyponatremia  Acute renal failure: Acute renal failure      , FAMILY HISTORY:  No pertinent family history in first degree relatives  PAST MEDICAL & SURGICAL HISTORY:  Prostatic cancer  Diabetes mellitus  HTN (hypertension)  No significant past surgical history    Patient is a 66y old  Male who presents with a chief complaint of CAD (30 Sep 2017 17:19)      14 system review was unremarkable  acute changes include acute respiratory failure  Vital signs, hemodynamic and respiratory parameters were reviewed from the bedside nursing flowsheet.  ICU Vital Signs Last 24 Hrs  T(C): 36.8 (08 Oct 2017 14:07), Max: 36.8 (08 Oct 2017 14:07)  T(F): 98.3 (08 Oct 2017 14:07), Max: 98.3 (08 Oct 2017 14:07)  HR: 76 (08 Oct 2017 13:00) (68 - 82)  BP: 101/60 (08 Oct 2017 13:00) (86/55 - 118/64)  BP(mean): 70 (08 Oct 2017 13:00) (64 - 98)  ABP: 114/56 (08 Oct 2017 12:00) (-106/50 - 124/56)  ABP(mean): 73 (08 Oct 2017 12:00) (54 - 176)  RR: 23 (08 Oct 2017 13:00) (9 - 23)  SpO2: 96% (08 Oct 2017 13:00) (93% - 100%)    Adult Advanced Hemodynamics Last 24 Hrs  CVP(mm Hg): --  CVP(cm H2O): --  CO: --  CI: --  PA: --  PA(mean): --  PCWP: --  SVR: --  SVRI: --  PVR: --  PVRI: --, ABG - ( 08 Oct 2017 02:59 )  pH: 7.48  /  pCO2: 36    /  pO2: 100   / HCO3: 27    / Base Excess: 3.3   /  SaO2: 98                  Intake and output was reviewed and the fluid balance was calculated  Daily     Daily Weight in k.3 (07 Oct 2017 15:01)  I&O's Summary    07 Oct 2017 07:  -  08 Oct 2017 07:00  --------------------------------------------------------  IN: 1074 mL / OUT: 630 mL / NET: 444 mL    08 Oct 2017 07:  -  08 Oct 2017 14:14  --------------------------------------------------------  IN: 325 mL / OUT: 100 mL / NET: 225 mL        All lines and drain sites were assessed  Glycemic trend was reviewedCAPILLARY BLOOD GLUCOSE  211 (08 Oct 2017 11:00)        No acute change in mental status  Auscultation of the chest reveals equal bs  Abdomen is soft  Extremities are warm and well perfused  Wounds appear clean and unremarkable  Antibiotics are periop    labs  CBC Full  -  ( 08 Oct 2017 03:05 )  WBC Count : 10.8 K/uL  Hemoglobin : 9.6 g/dL  Hematocrit : 27.6 %  Platelet Count - Automated : 130 K/uL  Mean Cell Volume : 87.9 fL  Mean Cell Hemoglobin : 30.6 pg  Mean Cell Hemoglobin Concentration : 34.8 g/dL  Auto Neutrophil # : x  Auto Lymphocyte # : x  Auto Monocyte # : x  Auto Eosinophil # : x  Auto Basophil # : x  Auto Neutrophil % : x  Auto Lymphocyte % : x  Auto Monocyte % : x  Auto Eosinophil % : x  Auto Basophil % : x    10-08    135  |  97  |  32<H>  ----------------------------<  159<H>  4.0   |  24  |  2.11<H>    Ca    8.8      08 Oct 2017 03:05  Phos  3.0     10-08  Mg     2.3     10-08      PT/INR - ( 08 Oct 2017 03:05 )   PT: 12.9 sec;   INR: 1.16          PTT - ( 08 Oct 2017 03:05 )  PTT:39.8 sec  The current medications were reviewed   MEDICATIONS  (STANDING):  aspirin enteric coated 81 milliGRAM(s) Oral daily  atorvastatin 40 milliGRAM(s) Oral at bedtime  clopidogrel Tablet 75 milliGRAM(s) Oral daily  dextrose 5%. 1000 milliLiter(s) (50 mL/Hr) IV Continuous <Continuous>  dextrose 50% Injectable 12.5 Gram(s) IV Push once  dextrose 50% Injectable 25 Gram(s) IV Push once  dextrose 50% Injectable 25 Gram(s) IV Push once  heparin  Injectable 5000 Unit(s) SubCutaneous every 8 hours  insulin lispro (HumaLOG) corrective regimen sliding scale   SubCutaneous Before meals and at bedtime  pantoprazole    Tablet 40 milliGRAM(s) Oral before breakfast  sertraline 25 milliGRAM(s) Oral daily  sodium chloride 0.45%. 1000 milliLiter(s) (10 mL/Hr) IV Continuous <Continuous>  testosterone patch 4 mG/24 Hr(s) 4 milliGRAM(s) Transdermal daily  vasopressin Infusion 0.017 Unit(s)/Min (1 mL/Hr) IV Continuous <Continuous>    MEDICATIONS  (PRN):  dextrose Gel 1 Dose(s) Oral once PRN Blood Glucose LESS THAN 70 milliGRAM(s)/deciliter  glucagon  Injectable 1 milliGRAM(s) IntraMuscular once PRN Glucose LESS THAN 70 milligrams/deciliter  oxyCODONE    5 mG/acetaminophen 325 mG 1 Tablet(s) Oral every 4 hours PRN Moderate Pain (4 - 6)  oxyCODONE    5 mG/acetaminophen 325 mG 2 Tablet(s) Oral every 6 hours PRN Severe Pain (7 - 10)       PROBLEM LIST/ ASSESSMENT:  HEALTH ISSUES - PROBLEM Dx:    hypotensive  fluid overload   Dialysis status    Anemia: Anemia  HTN (hypertension): HTN (hypertension)  Coronary artery disease: Coronary artery disease  Hyponatremia: Hyponatremia  Acute renal failure: Acute renal failure      ,   Patient is a 66y old  Male who presents with a chief complaint of CAD (30 Sep 2017 17:19)     s/p   acute changes include acute respiratory failure    My plan includes :  close hemodynamic, ventilatory and drain monitoring and management per post op routine  HD as per renal svc schedule  consider transfusing packed cells if hypotension + low Hct persists  Monitor for arrhythmias and monitor parameters for organ perfusion  monitor neurologic status  Head of the bed should remain elevated to 45 deg .   chest PT and IS will be encouraged  monitor adequacy of oxygenation and ventilation and attempt to wean oxygen  Nutritional goals will be met using po eventually , ensure adequate caloric intake and montior the same  Stress ulcer and VTE prophylaxis will be achieved    Glycemic control is satisfactory  Electrolytes have been repleted as necessary and wound care has been carried out. Pain control has been achieved.   agressive physical therapy and early mobility and ambulation goals will be met   The family was updated about the course and plan  CRITICAL CARE TIME SPENT in evaluation and management, reassessments, review and interpretation of labs and x-rays, ventilator and hemodynamic management, formulating a plan and coordinating care: ___90____ MIN.  Time does not include procedural time.  CTICU ATTENDING     					    Eric Tripathi MD

## 2017-10-08 NOTE — PROGRESS NOTE ADULT - PROBLEM SELECTOR PLAN 1
Oliguric Teresa mel due to recent contrast and cardio-renal disease   Patient still remain oliguric and low urine output of 100-200 cc/day.  Patient last dialyzed on 10/07 with UF goal of 0.5 L  No HD treatment today.  Electrolytes acceptable K: 4.0  Avoid nephrotoxic agents  Monitor BMP daily  Strict I/o monitoring

## 2017-10-09 LAB
ALBUMIN SERPL ELPH-MCNC: 2.4 G/DL — LOW (ref 3.3–5)
ALBUMIN SERPL ELPH-MCNC: 2.9 G/DL — LOW (ref 3.3–5)
ALBUMIN SERPL ELPH-MCNC: 3.1 G/DL — LOW (ref 3.3–5)
ALP SERPL-CCNC: 77 U/L — SIGNIFICANT CHANGE UP (ref 40–120)
ALP SERPL-CCNC: 79 U/L — SIGNIFICANT CHANGE UP (ref 40–120)
ALP SERPL-CCNC: 91 U/L — SIGNIFICANT CHANGE UP (ref 40–120)
ALT FLD-CCNC: 22 U/L — SIGNIFICANT CHANGE UP (ref 10–45)
ALT FLD-CCNC: 27 U/L — SIGNIFICANT CHANGE UP (ref 10–45)
ALT FLD-CCNC: 8 U/L — LOW (ref 10–45)
ANION GAP SERPL CALC-SCNC: 12 MMOL/L — SIGNIFICANT CHANGE UP (ref 5–17)
ANION GAP SERPL CALC-SCNC: 13 MMOL/L — SIGNIFICANT CHANGE UP (ref 5–17)
ANION GAP SERPL CALC-SCNC: 13 MMOL/L — SIGNIFICANT CHANGE UP (ref 5–17)
APTT BLD: 38.7 SEC — HIGH (ref 27.5–37.4)
APTT BLD: 39.7 SEC — HIGH (ref 27.5–37.4)
APTT BLD: 50.8 SEC — HIGH (ref 27.5–37.4)
AST SERPL-CCNC: 26 U/L — SIGNIFICANT CHANGE UP (ref 10–40)
AST SERPL-CCNC: 70 U/L — HIGH (ref 10–40)
AST SERPL-CCNC: 75 U/L — HIGH (ref 10–40)
BILIRUB SERPL-MCNC: 1 MG/DL — SIGNIFICANT CHANGE UP (ref 0.2–1.2)
BILIRUB SERPL-MCNC: 1.1 MG/DL — SIGNIFICANT CHANGE UP (ref 0.2–1.2)
BILIRUB SERPL-MCNC: 1.7 MG/DL — HIGH (ref 0.2–1.2)
BLD GP AB SCN SERPL QL: NEGATIVE — SIGNIFICANT CHANGE UP
BUN SERPL-MCNC: 35 MG/DL — HIGH (ref 7–23)
BUN SERPL-MCNC: 39 MG/DL — HIGH (ref 7–23)
BUN SERPL-MCNC: 40 MG/DL — HIGH (ref 7–23)
CALCIUM SERPL-MCNC: 7.4 MG/DL — LOW (ref 8.4–10.5)
CALCIUM SERPL-MCNC: 8.5 MG/DL — SIGNIFICANT CHANGE UP (ref 8.4–10.5)
CALCIUM SERPL-MCNC: 8.5 MG/DL — SIGNIFICANT CHANGE UP (ref 8.4–10.5)
CHLORIDE SERPL-SCNC: 95 MMOL/L — LOW (ref 96–108)
CHLORIDE SERPL-SCNC: 98 MMOL/L — SIGNIFICANT CHANGE UP (ref 96–108)
CHLORIDE SERPL-SCNC: 99 MMOL/L — SIGNIFICANT CHANGE UP (ref 96–108)
CO2 SERPL-SCNC: 22 MMOL/L — SIGNIFICANT CHANGE UP (ref 22–31)
CO2 SERPL-SCNC: 24 MMOL/L — SIGNIFICANT CHANGE UP (ref 22–31)
CO2 SERPL-SCNC: 26 MMOL/L — SIGNIFICANT CHANGE UP (ref 22–31)
CREAT SERPL-MCNC: 2.13 MG/DL — HIGH (ref 0.5–1.3)
CREAT SERPL-MCNC: 2.45 MG/DL — HIGH (ref 0.5–1.3)
CREAT SERPL-MCNC: 2.53 MG/DL — HIGH (ref 0.5–1.3)
GLUCOSE SERPL-MCNC: 131 MG/DL — HIGH (ref 70–99)
GLUCOSE SERPL-MCNC: 176 MG/DL — HIGH (ref 70–99)
GLUCOSE SERPL-MCNC: 188 MG/DL — HIGH (ref 70–99)
HCT VFR BLD CALC: 26.6 % — LOW (ref 39–50)
HCT VFR BLD CALC: 28.2 % — LOW (ref 39–50)
HCT VFR BLD CALC: 28.7 % — LOW (ref 39–50)
HGB BLD-MCNC: 9.2 G/DL — LOW (ref 13–17)
HGB BLD-MCNC: 9.3 G/DL — LOW (ref 13–17)
HGB BLD-MCNC: 9.4 G/DL — LOW (ref 13–17)
INR BLD: 1.08 — SIGNIFICANT CHANGE UP (ref 0.88–1.16)
INR BLD: 1.12 — SIGNIFICANT CHANGE UP (ref 0.88–1.16)
INR BLD: 1.27 — HIGH (ref 0.88–1.16)
LACTATE SERPL-SCNC: 1.2 MMOL/L — SIGNIFICANT CHANGE UP (ref 0.5–2)
LACTATE SERPL-SCNC: 2.2 MMOL/L — HIGH (ref 0.5–2)
MAGNESIUM SERPL-MCNC: 2 MG/DL — SIGNIFICANT CHANGE UP (ref 1.6–2.6)
MAGNESIUM SERPL-MCNC: 2.3 MG/DL — SIGNIFICANT CHANGE UP (ref 1.6–2.6)
MAGNESIUM SERPL-MCNC: 2.3 MG/DL — SIGNIFICANT CHANGE UP (ref 1.6–2.6)
MCHC RBC-ENTMCNC: 30.1 PG — SIGNIFICANT CHANGE UP (ref 27–34)
MCHC RBC-ENTMCNC: 30.2 PG — SIGNIFICANT CHANGE UP (ref 27–34)
MCHC RBC-ENTMCNC: 31 PG — SIGNIFICANT CHANGE UP (ref 27–34)
MCHC RBC-ENTMCNC: 32.8 G/DL — SIGNIFICANT CHANGE UP (ref 32–36)
MCHC RBC-ENTMCNC: 33 G/DL — SIGNIFICANT CHANGE UP (ref 32–36)
MCHC RBC-ENTMCNC: 34.6 G/DL — SIGNIFICANT CHANGE UP (ref 32–36)
MCV RBC AUTO: 89.6 FL — SIGNIFICANT CHANGE UP (ref 80–100)
MCV RBC AUTO: 91.6 FL — SIGNIFICANT CHANGE UP (ref 80–100)
MCV RBC AUTO: 92 FL — SIGNIFICANT CHANGE UP (ref 80–100)
PHOSPHATE SERPL-MCNC: 2.7 MG/DL — SIGNIFICANT CHANGE UP (ref 2.5–4.5)
PHOSPHATE SERPL-MCNC: 2.8 MG/DL — SIGNIFICANT CHANGE UP (ref 2.5–4.5)
PHOSPHATE SERPL-MCNC: 3 MG/DL — SIGNIFICANT CHANGE UP (ref 2.5–4.5)
PLATELET # BLD AUTO: 135 K/UL — LOW (ref 150–400)
PLATELET # BLD AUTO: 139 K/UL — LOW (ref 150–400)
PLATELET # BLD AUTO: 157 K/UL — SIGNIFICANT CHANGE UP (ref 150–400)
POTASSIUM SERPL-MCNC: 3.4 MMOL/L — LOW (ref 3.5–5.3)
POTASSIUM SERPL-MCNC: 3.9 MMOL/L — SIGNIFICANT CHANGE UP (ref 3.5–5.3)
POTASSIUM SERPL-MCNC: 4 MMOL/L — SIGNIFICANT CHANGE UP (ref 3.5–5.3)
POTASSIUM SERPL-SCNC: 3.4 MMOL/L — LOW (ref 3.5–5.3)
POTASSIUM SERPL-SCNC: 3.9 MMOL/L — SIGNIFICANT CHANGE UP (ref 3.5–5.3)
POTASSIUM SERPL-SCNC: 4 MMOL/L — SIGNIFICANT CHANGE UP (ref 3.5–5.3)
PROT SERPL-MCNC: 5 G/DL — LOW (ref 6–8.3)
PROT SERPL-MCNC: 5.7 G/DL — LOW (ref 6–8.3)
PROT SERPL-MCNC: 5.9 G/DL — LOW (ref 6–8.3)
PROTHROM AB SERPL-ACNC: 12 SEC — SIGNIFICANT CHANGE UP (ref 9.8–12.7)
PROTHROM AB SERPL-ACNC: 12.5 SEC — SIGNIFICANT CHANGE UP (ref 9.8–12.7)
PROTHROM AB SERPL-ACNC: 14.2 SEC — HIGH (ref 9.8–12.7)
RBC # BLD: 2.97 M/UL — LOW (ref 4.2–5.8)
RBC # BLD: 3.08 M/UL — LOW (ref 4.2–5.8)
RBC # BLD: 3.12 M/UL — LOW (ref 4.2–5.8)
RBC # FLD: 13.6 % — SIGNIFICANT CHANGE UP (ref 10.3–16.9)
RBC # FLD: 14 % — SIGNIFICANT CHANGE UP (ref 10.3–16.9)
RBC # FLD: 14.5 % — SIGNIFICANT CHANGE UP (ref 10.3–16.9)
RH IG SCN BLD-IMP: POSITIVE — SIGNIFICANT CHANGE UP
SODIUM SERPL-SCNC: 132 MMOL/L — LOW (ref 135–145)
SODIUM SERPL-SCNC: 134 MMOL/L — LOW (ref 135–145)
SODIUM SERPL-SCNC: 136 MMOL/L — SIGNIFICANT CHANGE UP (ref 135–145)
WBC # BLD: 10.4 K/UL — SIGNIFICANT CHANGE UP (ref 3.8–10.5)
WBC # BLD: 8.7 K/UL — SIGNIFICANT CHANGE UP (ref 3.8–10.5)
WBC # BLD: 9.2 K/UL — SIGNIFICANT CHANGE UP (ref 3.8–10.5)
WBC # FLD AUTO: 10.4 K/UL — SIGNIFICANT CHANGE UP (ref 3.8–10.5)
WBC # FLD AUTO: 8.7 K/UL — SIGNIFICANT CHANGE UP (ref 3.8–10.5)
WBC # FLD AUTO: 9.2 K/UL — SIGNIFICANT CHANGE UP (ref 3.8–10.5)
WRIGHT STN STL: SIGNIFICANT CHANGE UP

## 2017-10-09 PROCEDURE — 99291 CRITICAL CARE FIRST HOUR: CPT

## 2017-10-09 PROCEDURE — 93010 ELECTROCARDIOGRAM REPORT: CPT

## 2017-10-09 PROCEDURE — 32551 INSERTION OF CHEST TUBE: CPT | Mod: LT

## 2017-10-09 PROCEDURE — 71010: CPT | Mod: 26,77

## 2017-10-09 PROCEDURE — 99292 CRITICAL CARE ADDL 30 MIN: CPT

## 2017-10-09 PROCEDURE — 99232 SBSQ HOSP IP/OBS MODERATE 35: CPT | Mod: GC

## 2017-10-09 RX ORDER — OXYCODONE AND ACETAMINOPHEN 5; 325 MG/1; MG/1
1 TABLET ORAL ONCE
Qty: 0 | Refills: 0 | Status: DISCONTINUED | OUTPATIENT
Start: 2017-10-09 | End: 2017-10-09

## 2017-10-09 RX ORDER — ALBUMIN HUMAN 25 %
250 VIAL (ML) INTRAVENOUS ONCE
Qty: 0 | Refills: 0 | Status: COMPLETED | OUTPATIENT
Start: 2017-10-09 | End: 2017-10-09

## 2017-10-09 RX ADMIN — Medication 6: at 21:57

## 2017-10-09 RX ADMIN — ATORVASTATIN CALCIUM 40 MILLIGRAM(S): 80 TABLET, FILM COATED ORAL at 21:53

## 2017-10-09 RX ADMIN — OXYCODONE AND ACETAMINOPHEN 1 TABLET(S): 5; 325 TABLET ORAL at 09:56

## 2017-10-09 RX ADMIN — OXYCODONE AND ACETAMINOPHEN 1 TABLET(S): 5; 325 TABLET ORAL at 10:56

## 2017-10-09 RX ADMIN — HEPARIN SODIUM 5000 UNIT(S): 5000 INJECTION INTRAVENOUS; SUBCUTANEOUS at 06:35

## 2017-10-09 RX ADMIN — OXYCODONE AND ACETAMINOPHEN 1 TABLET(S): 5; 325 TABLET ORAL at 18:05

## 2017-10-09 RX ADMIN — SERTRALINE 25 MILLIGRAM(S): 25 TABLET, FILM COATED ORAL at 12:15

## 2017-10-09 RX ADMIN — Medication 4 MILLIGRAM(S): at 12:27

## 2017-10-09 RX ADMIN — Medication 125 MILLILITER(S): at 10:39

## 2017-10-09 RX ADMIN — PANTOPRAZOLE SODIUM 40 MILLIGRAM(S): 20 TABLET, DELAYED RELEASE ORAL at 06:35

## 2017-10-09 RX ADMIN — Medication 2: at 17:05

## 2017-10-09 RX ADMIN — CLOPIDOGREL BISULFATE 75 MILLIGRAM(S): 75 TABLET, FILM COATED ORAL at 12:15

## 2017-10-09 RX ADMIN — Medication 4 MILLIGRAM(S): at 12:15

## 2017-10-09 RX ADMIN — OXYCODONE AND ACETAMINOPHEN 1 TABLET(S): 5; 325 TABLET ORAL at 17:05

## 2017-10-09 RX ADMIN — OXYCODONE AND ACETAMINOPHEN 1 TABLET(S): 5; 325 TABLET ORAL at 21:53

## 2017-10-09 RX ADMIN — Medication 4: at 07:18

## 2017-10-09 RX ADMIN — Medication 2: at 12:14

## 2017-10-09 RX ADMIN — Medication 81 MILLIGRAM(S): at 12:15

## 2017-10-09 NOTE — PROGRESS NOTE ADULT - SUBJECTIVE AND OBJECTIVE BOX
CTICU  CRITICAL  CARE  attending     Hand off received 					   Pertinent clinical, laboratory, radiographic, hemodynamic, echocardiographic, respiratory data, microbiologic data and chart were reviewed and analyzed frequently throughout the course of the day and night  Patient seen and examined with CTS/ SH attending at bedside  Pt is a 66y , Male, HEALTH ISSUES - PROBLEM Dx:  Anemia: Anemia  HTN (hypertension): HTN (hypertension)  Coronary artery disease: Coronary artery disease  Hyponatremia: Hyponatremia  Acute renal failure: Acute renal failure      , FAMILY HISTORY:  No pertinent family history in first degree relatives  PAST MEDICAL & SURGICAL HISTORY:  Prostatic cancer  Diabetes mellitus  HTN (hypertension)  No significant past surgical history    Patient is a 66y old  Male who presents with a chief complaint of CAD (30 Sep 2017 17:19)      14 system review was unremarkable  acute changes include acute respiratory failure  Vital signs, hemodynamic and respiratory parameters were reviewed from the bedside nursing flowsheet.  ICU Vital Signs Last 24 Hrs  T(C): 36.7 (09 Oct 2017 05:00), Max: 37.2 (08 Oct 2017 20:23)  T(F): 98.1 (09 Oct 2017 05:00), Max: 99 (08 Oct 2017 20:23)  HR: 70 (09 Oct 2017 09:00) (68 - 76)  BP: 104/61 (09 Oct 2017 09:00) (89/55 - 113/66)  BP(mean): 73 (09 Oct 2017 09:00) (64 - 88)  ABP: 98/74 (08 Oct 2017 19:00) (90/40 - 114/56)  ABP(mean): 88 (08 Oct 2017 19:00) (66 - 166)  RR: 18 (09 Oct 2017 09:00) (9 - 23)  SpO2: 99% (09 Oct 2017 09:00) (93% - 99%)    Adult Advanced Hemodynamics Last 24 Hrs  CVP(mm Hg): --  CVP(cm H2O): --  CO: --  CI: --  PA: --  PA(mean): --  PCWP: --  SVR: --  SVRI: --  PVR: --  PVRI: --, ABG - ( 08 Oct 2017 02:59 )  pH: 7.48  /  pCO2: 36    /  pO2: 100   / HCO3: 27    / Base Excess: 3.3   /  SaO2: 98                  Intake and output was reviewed and the fluid balance was calculated  Daily     Daily   I&O's Summary    08 Oct 2017 07:01  -  09 Oct 2017 07:00  --------------------------------------------------------  IN: 780 mL / OUT: 350 mL / NET: 430 mL    09 Oct 2017 07:01  -  09 Oct 2017 09:46  --------------------------------------------------------  IN: 5 mL / OUT: 0 mL / NET: 5 mL        All lines and drain sites were assessed  Glycemic trend was reviewedCAPILLARY BLOOD GLUCOSE  212 (09 Oct 2017 07:00)        No acute change in mental status  Auscultation of the chest reveals equal bs  Abdomen is soft  Extremities are warm and well perfused  Wounds appear clean and unremarkable  Antibiotics are periop    labs  CBC Full  -  ( 09 Oct 2017 09:02 )  WBC Count : 10.4 K/uL  Hemoglobin : 9.4 g/dL  Hematocrit : 28.7 %  Platelet Count - Automated : 157 K/uL  Mean Cell Volume : 92.0 fL  Mean Cell Hemoglobin : 30.1 pg  Mean Cell Hemoglobin Concentration : 32.8 g/dL  Auto Neutrophil # : x  Auto Lymphocyte # : x  Auto Monocyte # : x  Auto Eosinophil # : x  Auto Basophil # : x  Auto Neutrophil % : x  Auto Lymphocyte % : x  Auto Monocyte % : x  Auto Eosinophil % : x  Auto Basophil % : x    10-09    132<L>  |  95<L>  |  39<H>  ----------------------------<  188<H>  4.0   |  24  |  2.45<H>    Ca    8.5      09 Oct 2017 09:02  Phos  2.7     10-09  Mg     2.3     10-09    TPro  5.9<L>  /  Alb  2.9<L>  /  TBili  1.1  /  DBili  x   /  AST  75<H>  /  ALT  22  /  AlkPhos  91  10-09    PT/INR - ( 09 Oct 2017 09:02 )   PT: 14.2 sec;   INR: 1.27          PTT - ( 09 Oct 2017 09:02 )  PTT:50.8 sec  The current medications were reviewed   MEDICATIONS  (STANDING):  aspirin enteric coated 81 milliGRAM(s) Oral daily  atorvastatin 40 milliGRAM(s) Oral at bedtime  clopidogrel Tablet 75 milliGRAM(s) Oral daily  dextrose 5%. 1000 milliLiter(s) (50 mL/Hr) IV Continuous <Continuous>  dextrose 50% Injectable 12.5 Gram(s) IV Push once  dextrose 50% Injectable 25 Gram(s) IV Push once  dextrose 50% Injectable 25 Gram(s) IV Push once  heparin  Injectable 5000 Unit(s) SubCutaneous every 8 hours  insulin lispro (HumaLOG) corrective regimen sliding scale   SubCutaneous Before meals and at bedtime  pantoprazole    Tablet 40 milliGRAM(s) Oral before breakfast  sertraline 25 milliGRAM(s) Oral daily  sodium chloride 0.45%. 1000 milliLiter(s) (10 mL/Hr) IV Continuous <Continuous>  testosterone patch 4 mG/24 Hr(s) 4 milliGRAM(s) Transdermal daily    MEDICATIONS  (PRN):  dextrose Gel 1 Dose(s) Oral once PRN Blood Glucose LESS THAN 70 milliGRAM(s)/deciliter  glucagon  Injectable 1 milliGRAM(s) IntraMuscular once PRN Glucose LESS THAN 70 milligrams/deciliter  oxyCODONE    5 mG/acetaminophen 325 mG 1 Tablet(s) Oral every 4 hours PRN Moderate Pain (4 - 6)  oxyCODONE    5 mG/acetaminophen 325 mG 2 Tablet(s) Oral every 6 hours PRN Severe Pain (7 - 10)       PROBLEM LIST/ ASSESSMENT:  HEALTH ISSUES - PROBLEM Dx:  Anemia: Anemia  HTN (hypertension): HTN (hypertension)  Coronary artery disease: Coronary artery disease  Hyponatremia: Hyponatremia  Acute renal failure: Acute renal failure      ,   Patient is a 66y old  Male who presents with a chief complaint of CAD (30 Sep 2017 17:19)     s/p cabg  acute changes include acute respiratory failure    My plan includes :  close hemodynamic, ventilatory and drain monitoring and management per post op routine    Monitor for arrhythmias and monitor parameters for organ perfusion  monitor neurologic status  Head of the bed should remain elevated to 45 deg .   chest PT and IS will be encouraged  monitor adequacy of oxygenation and ventilation and attempt to wean oxygen  Nutritional goals will be met using po eventually , ensure adequate caloric intake and montior the same  Stress ulcer and VTE prophylaxis will be achieved    Glycemic control is satisfactory  Electrolytes have been repleted as necessary and wound care has been carried out. Pain control has been achieved.   agressive physical therapy and early mobility and ambulation goals will be met   The family was updated about the course and plan  CRITICAL CARE TIME SPENT in evaluation and management, reassessments, review and interpretation of labs and x-rays, ventilator and hemodynamic management, formulating a plan and coordinating care: ___90____ MIN.  Time does not include procedural time.  CTICU ATTENDING     					    Romie Akbar MD

## 2017-10-09 NOTE — PROGRESS NOTE ADULT - ASSESSMENT
This is 66 year old male with multiple comorbid condition underwent CABG on 10/05. Nephrology consult called for Oliguric YOANDY needing HD treatment. Patient last dialyzed on 10/07/2017 with UF of 0.5 L. Received 2 Units of PRBC yesterday. No significant urine output since yesterday. This is 66 year old male with multiple comorbid condition underwent CABG on 10/05. Nephrology consult called for Oliguric YOANDY needing HD treatment. Patient last dialyzed on 10/07/2017 with UF of 0.5 L. Patient creatinine still remain stable at 2.4 at present with urine output of 400 cc/24 hours at present.

## 2017-10-09 NOTE — PROCEDURE NOTE - NSPOSTPRCRAD_GEN_A_CORE
chest tube in correct position
chest tube in correct position
central line located in the/no pneumothorax/central line located in the superior vena cava/post-procedure radiography performed

## 2017-10-09 NOTE — PROCEDURE NOTE - NSPROCDETAILS_GEN_ALL_CORE
Seldinger technique
location identified, draped/prepped, sterile technique used, needle inserted/introduced/ultrasound guidance
Seldinger technique/secured in place/sterile dressing applied/thoracostomy tube placed percutaneously
lumen(s) aspirated and flushed/sterile dressing applied/ultrasound guidance/guidewire recovered

## 2017-10-09 NOTE — PROCEDURE NOTE - NSSITEPREP_SKIN_A_CORE
chlorhexidine
Adherence to aseptic technique: hand hygiene prior to donning barriers (gown, gloves), don cap and mask, sterile drape over patient
chlorhexidine
Adherence to aseptic technique: hand hygiene prior to donning barriers (gown, gloves), don cap and mask, sterile drape over patient/chlorhexidine

## 2017-10-09 NOTE — PROGRESS NOTE ADULT - SUBJECTIVE AND OBJECTIVE BOX
Patient is a 66y Male seen and evaluated at bedside. Patient denies any chest pain, shortness of breath, palpitations at present. Denies any other complaints at present.      aspirin enteric coated 81 daily  atorvastatin 40 at bedtime  clopidogrel Tablet 75 daily  dextrose 5%. 1000 <Continuous>  dextrose 50% Injectable 12.5 once  dextrose 50% Injectable 25 once  dextrose 50% Injectable 25 once  dextrose Gel 1 once PRN  glucagon  Injectable 1 once PRN  insulin lispro (HumaLOG) corrective regimen sliding scale  Before meals and at bedtime  oxyCODONE    5 mG/acetaminophen 325 mG 1 every 4 hours PRN  oxyCODONE    5 mG/acetaminophen 325 mG 2 every 6 hours PRN  pantoprazole    Tablet 40 before breakfast  sertraline 25 daily  sodium chloride 0.45%. 1000 <Continuous>  testosterone patch 4 mG/24 Hr(s) 4 daily      Allergies    No Known Allergies    Intolerances        T(C): , Max: 37.2 (10-08-17 @ 20:23)  T(F): , Max: 99 (10-08-17 @ 20:23)  HR: 66 (10-09-17 @ 12:00)  BP: 104/49 (10-09-17 @ 12:00)  BP(mean): 69 (10-09-17 @ 11:00)  RR: 16 (10-09-17 @ 12:00)  SpO2: 93% (10-09-17 @ 12:00)  Wt(kg): --    10-08 @ 07:01  -  10-09 @ 07:00  --------------------------------------------------------  IN: 780 mL / OUT: 350 mL / NET: 430 mL    10-09 @ 07:01  -  10-09 @ 12:44  --------------------------------------------------------  IN: 605 mL / OUT: 150 mL / NET: 455 mL          Review of Systems:  CONSTITUTIONAL: No fever or chills, No fatigue or tiredness.  EYES: No blurred or double vision.  RESPIRATORY: No shortness of breath, cough, hemoptysis  CARDIOVASCULAR: No Chest pain or shortness of breath  GASTROINTESTINAL: NO abdominal or flank pain, No nausea or vomiting, No diarrhea  GENITOURINARY: No dysuria or urinary burning, No difficulty passing urine, No hematuria  NEUROLOGICAL: No headaches or blurred vision  SKIN: No skin rashes   MUSCULOSKELETAL: No arthralgia, Joint pain, leg edema, No muscle pains      PHYSICAL EXAM:  GENERAL: NAD, well-developed, well nourished, alert, awake, no acute distress at present  HEAD:  Atraumatic, Normocephalic,   EYES: Bilateral conjuctival and scleral pallor+nt  Oral cavity: Oral mucosa dry and pale  NECK: Neck supple, No JVD  CHEST/LUNG: Bilateral decreased breath sounds, Bibasilar minimal rales and crepitations  HEART: Regular rate and rhythm. AGUEDA II/VI at LPSB, No gallop, no rub   ABDOMEN: Soft, Nontender, BS+nt, No flank tenderness.   EXTREMITIES: No clubbing, cyanosis, or edema  Neurology: AAOx3, no focal neurological deficit  SKIN: No rashes or lesions          ACCESS: Right IJ permacath    LABS:                        9.4    10.4  )-----------( 157      ( 09 Oct 2017 09:02 )             28.7     10-09    132<L>  |  95<L>  |  39<H>  ----------------------------<  188<H>  4.0   |  24  |  2.45<H>    Ca    8.5      09 Oct 2017 09:02  Phos  2.7     10-09  Mg     2.3     10-09    TPro  5.9<L>  /  Alb  2.9<L>  /  TBili  1.1  /  DBili  x   /  AST  75<H>  /  ALT  22  /  AlkPhos  91  10-09      PT/INR - ( 09 Oct 2017 09:02 )   PT: 14.2 sec;   INR: 1.27          PTT - ( 09 Oct 2017 09:02 )  PTT:50.8 sec          RADIOLOGY & ADDITIONAL STUDIES:  < from: Xray Chest 1 View AP -PORTABLE-Routine (10.09.17 @ 04:19) >    EXAM:  XR CHEST 1 VIEW PORT ROUTINE                          PROCEDURE DATE:  10/09/2017                     INTERPRETATION:    Portable AP Radiograph dated 10/9/2017 4:19 AM    CLINICAL INFORMATION: 66 years, Male, Follow Up.    PRIOR STUDIES: October 8, 2017    FINDINGS: Heart size is stable. Median sternotomy wires and right   internal jugular vein tunneled dialysis catheter again seen. Pulmonary   vascular congestion has slightly improved. Stable left pleural effusion.    IMPRESSION:  Slightly improved pulmonary vascular congestion. Stable left pleural   effusion.            "Thank you for the opportunity to participate in the care of this   patient."        KYLE FREITAS M.D., RADIOLOGY ATTENDING  This document has been electronically signed. Oct  9 2017  9:56AM                  < end of copied text > Patient is a 66y Male seen and evaluated at bedside. Patient denies any chest pain, shortness of breath, palpitations at present. Denies any other complaints at present.      aspirin enteric coated 81 daily  atorvastatin 40 at bedtime  clopidogrel Tablet 75 daily  dextrose 5%. 1000 <Continuous>  dextrose 50% Injectable 12.5 once  dextrose 50% Injectable 25 once  dextrose 50% Injectable 25 once  dextrose Gel 1 once PRN  glucagon  Injectable 1 once PRN  insulin lispro (HumaLOG) corrective regimen sliding scale  Before meals and at bedtime  oxyCODONE    5 mG/acetaminophen 325 mG 1 every 4 hours PRN  oxyCODONE    5 mG/acetaminophen 325 mG 2 every 6 hours PRN  pantoprazole    Tablet 40 before breakfast  sertraline 25 daily  sodium chloride 0.45%. 1000 <Continuous>  testosterone patch 4 mG/24 Hr(s) 4 daily      Allergies    No Known Allergies    Intolerances        T(C): , Max: 37.2 (10-08-17 @ 20:23)  T(F): , Max: 99 (10-08-17 @ 20:23)  HR: 66 (10-09-17 @ 12:00)  BP: 104/49 (10-09-17 @ 12:00)  BP(mean): 69 (10-09-17 @ 11:00)  RR: 16 (10-09-17 @ 12:00)  SpO2: 93% (10-09-17 @ 12:00)  Wt(kg): --    10-08 @ 07:01  -  10-09 @ 07:00  --------------------------------------------------------  IN: 780 mL / OUT: 350 mL / NET: 430 mL    10-09 @ 07:01  -  10-09 @ 12:44  --------------------------------------------------------  IN: 605 mL / OUT: 150 mL / NET: 455 mL          Review of Systems:  CONSTITUTIONAL: No fever or chills, No fatigue or tiredness.  EYES: No blurred or double vision.  RESPIRATORY: No shortness of breath, cough, hemoptysis  CARDIOVASCULAR: No Chest pain or shortness of breath  GASTROINTESTINAL: NO abdominal or flank pain, No nausea or vomiting, No diarrhea  GENITOURINARY: No dysuria or urinary burning, No difficulty passing urine, No hematuria  NEUROLOGICAL: No headaches or blurred vision  SKIN: No skin rashes   MUSCULOSKELETAL: No arthralgia, Joint pain, leg edema, No muscle pains      PHYSICAL EXAM:  GENERAL: NAD, well-developed, well nourished, alert, awake, no acute distress at present  HEAD:  Atraumatic, Normocephalic,   EYES: Bilateral conjuctival and scleral pallor+nt  Oral cavity: Oral mucosa dry and pale  NECK: Neck supple, No JVD  CHEST/LUNG: Bilateral decreased breath sounds, Bibasilar minimal rales and crepitations, surgical scar+nt, bleeding  HEART: Regular rate and rhythm. AGUEDA II/VI at LPSB, No gallop, no rub   ABDOMEN: Soft, Nontender, BS+nt, No flank tenderness.   EXTREMITIES: No clubbing, cyanosis, or edema  Neurology: AAOx3, no focal neurological deficit  SKIN: No rashes or lesions          ACCESS: Right IJ permacath    LABS:                        9.4    10.4  )-----------( 157      ( 09 Oct 2017 09:02 )             28.7     10-09    132<L>  |  95<L>  |  39<H>  ----------------------------<  188<H>  4.0   |  24  |  2.45<H>    Ca    8.5      09 Oct 2017 09:02  Phos  2.7     10-09  Mg     2.3     10-09    TPro  5.9<L>  /  Alb  2.9<L>  /  TBili  1.1  /  DBili  x   /  AST  75<H>  /  ALT  22  /  AlkPhos  91  10-09      PT/INR - ( 09 Oct 2017 09:02 )   PT: 14.2 sec;   INR: 1.27          PTT - ( 09 Oct 2017 09:02 )  PTT:50.8 sec          RADIOLOGY & ADDITIONAL STUDIES:  < from: Xray Chest 1 View AP -PORTABLE-Routine (10.09.17 @ 04:19) >    EXAM:  XR CHEST 1 VIEW PORT ROUTINE                          PROCEDURE DATE:  10/09/2017                     INTERPRETATION:    Portable AP Radiograph dated 10/9/2017 4:19 AM    CLINICAL INFORMATION: 66 years, Male, Follow Up.    PRIOR STUDIES: October 8, 2017    FINDINGS: Heart size is stable. Median sternotomy wires and right   internal jugular vein tunneled dialysis catheter again seen. Pulmonary   vascular congestion has slightly improved. Stable left pleural effusion.    IMPRESSION:  Slightly improved pulmonary vascular congestion. Stable left pleural   effusion.            "Thank you for the opportunity to participate in the care of this   patient."        KYLE FREITAS M.D., RADIOLOGY ATTENDING  This document has been electronically signed. Oct  9 2017  9:56AM                  < end of copied text >

## 2017-10-09 NOTE — PROCEDURE NOTE - NSINFORMCONSENT_GEN_A_CORE
Benefits, risks, and possible complications of procedure explained to patient/caregiver who verbalized understanding and gave verbal consent.
Benefits, risks, and possible complications of procedure explained to patient/caregiver who verbalized understanding and gave verbal consent.
Benefits, risks, and possible complications of procedure explained to patient/caregiver who verbalized understanding and gave written consent.
Benefits, risks, and possible complications of procedure explained to patient/caregiver who verbalized understanding and gave written consent.

## 2017-10-09 NOTE — CHART NOTE - NSCHARTNOTEFT_GEN_A_CORE
Admitting Diagnosis:   Patient is a 66y old  Male who presents with a chief complaint of CAD (30 Sep 2017 17:19)      PAST MEDICAL & SURGICAL HISTORY:  Prostatic cancer  Diabetes mellitus  HTN (hypertension)  No significant past surgical history      Current Nutrition Order:       PO Intake: Good (%) [   ]  Fair (50-75%) [   ] Poor (<25%) [   ]    GI Issues:     Pain:    Skin Integrity:    Labs:   10-09    132<L>  |  95<L>  |  39<H>  ----------------------------<  188<H>  4.0   |  24  |  2.45<H>    Ca    8.5      09 Oct 2017 09:02  Phos  2.7     10-09  Mg     2.3     10-09    TPro  5.9<L>  /  Alb  2.9<L>  /  TBili  1.1  /  DBili  x   /  AST  75<H>  /  ALT  22  /  AlkPhos  91  10-09    CAPILLARY BLOOD GLUCOSE  212 (09 Oct 2017 07:00)  201 (08 Oct 2017 22:00)  181 (08 Oct 2017 16:00)          Medications:  MEDICATIONS  (STANDING):  aspirin enteric coated 81 milliGRAM(s) Oral daily  atorvastatin 40 milliGRAM(s) Oral at bedtime  clopidogrel Tablet 75 milliGRAM(s) Oral daily  dextrose 5%. 1000 milliLiter(s) (50 mL/Hr) IV Continuous <Continuous>  dextrose 50% Injectable 12.5 Gram(s) IV Push once  dextrose 50% Injectable 25 Gram(s) IV Push once  dextrose 50% Injectable 25 Gram(s) IV Push once  insulin lispro (HumaLOG) corrective regimen sliding scale   SubCutaneous Before meals and at bedtime  pantoprazole    Tablet 40 milliGRAM(s) Oral before breakfast  sertraline 25 milliGRAM(s) Oral daily  sodium chloride 0.45%. 1000 milliLiter(s) (10 mL/Hr) IV Continuous <Continuous>  testosterone patch 4 mG/24 Hr(s) 4 milliGRAM(s) Transdermal daily    MEDICATIONS  (PRN):  dextrose Gel 1 Dose(s) Oral once PRN Blood Glucose LESS THAN 70 milliGRAM(s)/deciliter  glucagon  Injectable 1 milliGRAM(s) IntraMuscular once PRN Glucose LESS THAN 70 milligrams/deciliter  oxyCODONE    5 mG/acetaminophen 325 mG 1 Tablet(s) Oral every 4 hours PRN Moderate Pain (4 - 6)  oxyCODONE    5 mG/acetaminophen 325 mG 2 Tablet(s) Oral every 6 hours PRN Severe Pain (7 - 10)      Weight:  Daily     Daily     Weight Change:     Estimated energy needs:   ABW 81.6kg used for EER and adjusted for post-op; fluids per team 2/2 CHF.  25-30kcal/kg (2040-2448kcal), 1.2-1.4g/kg (98-114g).     Subjective:   67y/o M s/p OPCAB x3. NPO and intubated. Per progress note, plan to wean to extubate. If pt remains intubated, consider EN initiation. If able to extubate perform dysphagia screen vs formal S&S prior to po. Will follow.    Previous Nutrition Diagnosis:  Inadequate oral intake RT NPO AEB pt meeting 0% of EER    Active [   ]  Resolved [   ]    If resolved, new PES:     Goal:    Recommendations:    Education:     Risk Level: High [   ] Moderate [   ] Low [   ] Admitting Diagnosis:   Patient is a 66y old  Male who presents with a chief complaint of CAD (30 Sep 2017 17:19)      PAST MEDICAL & SURGICAL HISTORY:  Prostatic cancer  Diabetes mellitus  HTN (hypertension)  No significant past surgical history      Current Nutrition Order:  Renal, Dash/TLC diet    PO Intake: Good (%) [   ]  Fair (50-75%) [X] Poor (<25%) [   ]    GI Issues:   Denies N/V  +BM today; loose stool    Pain:  Denies current     Skin Integrity:  MSI, R lower leg/R upper back/bilateral groin ASWs, IAD to perineum     Labs:   10-09    132<L>  |  95<L>  |  39<H>  ----------------------------<  188<H>  4.0   |  24  |  2.45<H>    Ca    8.5      09 Oct 2017 09:02  Phos  2.7     10-09  Mg     2.3     10-09    TPro  5.9<L>  /  Alb  2.9<L>  /  TBili  1.1  /  DBili  x   /  AST  75<H>  /  ALT  22  /  AlkPhos  91  10-09    CAPILLARY BLOOD GLUCOSE  212 (09 Oct 2017 07:00)  201 (08 Oct 2017 22:00)  181 (08 Oct 2017 16:00)          Medications:  MEDICATIONS  (STANDING):  aspirin enteric coated 81 milliGRAM(s) Oral daily  atorvastatin 40 milliGRAM(s) Oral at bedtime  clopidogrel Tablet 75 milliGRAM(s) Oral daily  dextrose 5%. 1000 milliLiter(s) (50 mL/Hr) IV Continuous <Continuous>  dextrose 50% Injectable 12.5 Gram(s) IV Push once  dextrose 50% Injectable 25 Gram(s) IV Push once  dextrose 50% Injectable 25 Gram(s) IV Push once  insulin lispro (HumaLOG) corrective regimen sliding scale   SubCutaneous Before meals and at bedtime  pantoprazole    Tablet 40 milliGRAM(s) Oral before breakfast  sertraline 25 milliGRAM(s) Oral daily  sodium chloride 0.45%. 1000 milliLiter(s) (10 mL/Hr) IV Continuous <Continuous>  testosterone patch 4 mG/24 Hr(s) 4 milliGRAM(s) Transdermal daily    MEDICATIONS  (PRN):  dextrose Gel 1 Dose(s) Oral once PRN Blood Glucose LESS THAN 70 milliGRAM(s)/deciliter  glucagon  Injectable 1 milliGRAM(s) IntraMuscular once PRN Glucose LESS THAN 70 milligrams/deciliter  oxyCODONE    5 mG/acetaminophen 325 mG 1 Tablet(s) Oral every 4 hours PRN Moderate Pain (4 - 6)  oxyCODONE    5 mG/acetaminophen 325 mG 2 Tablet(s) Oral every 6 hours PRN Severe Pain (7 - 10)      Weight:  81.6kg (9/30)--> 80.3kg (10/7)    Weight Change:   fairly stable    Estimated energy needs:   ABW 81.6kg used for EER and adjusted for post-op; fluids per team 2/2 CHF.  25-30kcal/kg (2040-2448kcal), 1.2-1.4g/kg (98-114g).     Subjective:   67y/o M s/p OPCAB x3. He has been receiving intermittent HD and for HD again today or tomorrow per MD. Pt seen resting OOB in chair. He ambulated with PT this am and reports feeling tired. Appetite is improving per pt; consuming 50-75% of meals depending on options available. PTA pt reports eating well, but denies following his dietary restrictions despite receiving prior education. Attempted diet reinforcement, but pt refusing. Left written material at bedside.     Previous Nutrition Diagnosis:  Inadequate oral intake RT NPO AEB pt meeting 0% of EER    Active [   ]  Resolved [X]    If resolved, new PES:   Limited adherence to nutrition related recommendations RT lack of compliance with diet despite prior education AEB pt report/recall of meals    Goal:  Pt to demonstrate diet compliance and consume at least 75% of EER     Recommendations:  1. Change diet to CST CHO, Dash/TLC, No [phos], No [K].  2. If intake remains poor <50%, start Glucerna BID.  3. Monitor lytes and replete prn.     Education:   Pt refusing diet reinforcement stating he knows what to do, just needs to start following it. Left written material at bedside.     Risk Level: High [   ] Moderate [X] Low [   ]

## 2017-10-09 NOTE — PROGRESS NOTE ADULT - PROBLEM SELECTOR PLAN 1
Oliguric Teresa mel due to recent contrast and cardio-renal disease   Patient still remain oliguric and low urine output of 300-400 cc/day.  Patient last dialyzed on 10/07 with UF goal of 0.5 L.  No HD treatment today.  Electrolytes acceptable K: 4.0  Avoid nephrotoxic agents  Monitor BMP daily  Strict I/o monitoring Oliguric Teresa mel due to recent contrast and cardio-renal disease   Patient still remain oliguric and low urine output of 300-400 cc/day.  Patient last dialyzed on 10/07 with UF goal of 0.6 L.  No HD treatment today.  Electrolytes acceptable K: 4.0  Avoid nephrotoxic agents  Monitor BMP daily  Strict I/o monitoring

## 2017-10-10 LAB
ALBUMIN SERPL ELPH-MCNC: 2.9 G/DL — LOW (ref 3.3–5)
ALP SERPL-CCNC: 93 U/L — SIGNIFICANT CHANGE UP (ref 40–120)
ALT FLD-CCNC: 28 U/L — SIGNIFICANT CHANGE UP (ref 10–45)
ANION GAP SERPL CALC-SCNC: 12 MMOL/L — SIGNIFICANT CHANGE UP (ref 5–17)
APTT BLD: 35.9 SEC — SIGNIFICANT CHANGE UP (ref 27.5–37.4)
AST SERPL-CCNC: 58 U/L — HIGH (ref 10–40)
BILIRUB SERPL-MCNC: 1.3 MG/DL — HIGH (ref 0.2–1.2)
BUN SERPL-MCNC: 39 MG/DL — HIGH (ref 7–23)
CALCIUM SERPL-MCNC: 8.4 MG/DL — SIGNIFICANT CHANGE UP (ref 8.4–10.5)
CHLORIDE SERPL-SCNC: 95 MMOL/L — LOW (ref 96–108)
CO2 SERPL-SCNC: 26 MMOL/L — SIGNIFICANT CHANGE UP (ref 22–31)
CREAT SERPL-MCNC: 2.43 MG/DL — HIGH (ref 0.5–1.3)
CULTURE RESULTS: SIGNIFICANT CHANGE UP
GLUCOSE BLDC GLUCOMTR-MCNC: 165 MG/DL — HIGH (ref 70–99)
GLUCOSE BLDC GLUCOMTR-MCNC: 172 MG/DL — HIGH (ref 70–99)
GLUCOSE BLDC GLUCOMTR-MCNC: 197 MG/DL — HIGH (ref 70–99)
GLUCOSE SERPL-MCNC: 161 MG/DL — HIGH (ref 70–99)
HCT VFR BLD CALC: 30.7 % — LOW (ref 39–50)
HGB BLD-MCNC: 10.2 G/DL — LOW (ref 13–17)
INR BLD: 1.11 — SIGNIFICANT CHANGE UP (ref 0.88–1.16)
MAGNESIUM SERPL-MCNC: 2.3 MG/DL — SIGNIFICANT CHANGE UP (ref 1.6–2.6)
MCHC RBC-ENTMCNC: 30.2 PG — SIGNIFICANT CHANGE UP (ref 27–34)
MCHC RBC-ENTMCNC: 33.2 G/DL — SIGNIFICANT CHANGE UP (ref 32–36)
MCV RBC AUTO: 90.8 FL — SIGNIFICANT CHANGE UP (ref 80–100)
PHOSPHATE SERPL-MCNC: 3.3 MG/DL — SIGNIFICANT CHANGE UP (ref 2.5–4.5)
PLATELET # BLD AUTO: 151 K/UL — SIGNIFICANT CHANGE UP (ref 150–400)
POTASSIUM SERPL-MCNC: 4 MMOL/L — SIGNIFICANT CHANGE UP (ref 3.5–5.3)
POTASSIUM SERPL-SCNC: 4 MMOL/L — SIGNIFICANT CHANGE UP (ref 3.5–5.3)
PROT SERPL-MCNC: 5.8 G/DL — LOW (ref 6–8.3)
PROTHROM AB SERPL-ACNC: 12.4 SEC — SIGNIFICANT CHANGE UP (ref 9.8–12.7)
RBC # BLD: 3.38 M/UL — LOW (ref 4.2–5.8)
RBC # FLD: 13.7 % — SIGNIFICANT CHANGE UP (ref 10.3–16.9)
SODIUM SERPL-SCNC: 133 MMOL/L — LOW (ref 135–145)
SPECIMEN SOURCE: SIGNIFICANT CHANGE UP
WBC # BLD: 8.8 K/UL — SIGNIFICANT CHANGE UP (ref 3.8–10.5)
WBC # FLD AUTO: 8.8 K/UL — SIGNIFICANT CHANGE UP (ref 3.8–10.5)

## 2017-10-10 PROCEDURE — 99233 SBSQ HOSP IP/OBS HIGH 50: CPT

## 2017-10-10 PROCEDURE — 90935 HEMODIALYSIS ONE EVALUATION: CPT | Mod: GC

## 2017-10-10 PROCEDURE — 93010 ELECTROCARDIOGRAM REPORT: CPT

## 2017-10-10 PROCEDURE — 71010: CPT | Mod: 26

## 2017-10-10 RX ORDER — HEPARIN SODIUM 5000 [USP'U]/ML
5000 INJECTION INTRAVENOUS; SUBCUTANEOUS EVERY 12 HOURS
Qty: 0 | Refills: 0 | Status: DISCONTINUED | OUTPATIENT
Start: 2017-10-10 | End: 2017-10-13

## 2017-10-10 RX ORDER — DIPHENOXYLATE HCL/ATROPINE 2.5-.025MG
1 TABLET ORAL EVERY 12 HOURS
Qty: 0 | Refills: 0 | Status: DISCONTINUED | OUTPATIENT
Start: 2017-10-10 | End: 2017-10-11

## 2017-10-10 RX ORDER — LIDOCAINE 4 G/100G
1 CREAM TOPICAL DAILY
Qty: 0 | Refills: 0 | Status: DISCONTINUED | OUTPATIENT
Start: 2017-10-10 | End: 2017-10-13

## 2017-10-10 RX ADMIN — ATORVASTATIN CALCIUM 40 MILLIGRAM(S): 80 TABLET, FILM COATED ORAL at 22:35

## 2017-10-10 RX ADMIN — OXYCODONE AND ACETAMINOPHEN 1 TABLET(S): 5; 325 TABLET ORAL at 15:36

## 2017-10-10 RX ADMIN — LIDOCAINE 1 PATCH: 4 CREAM TOPICAL at 22:35

## 2017-10-10 RX ADMIN — Medication 2: at 14:04

## 2017-10-10 RX ADMIN — OXYCODONE AND ACETAMINOPHEN 1 TABLET(S): 5; 325 TABLET ORAL at 16:00

## 2017-10-10 RX ADMIN — Medication 81 MILLIGRAM(S): at 15:41

## 2017-10-10 RX ADMIN — CLOPIDOGREL BISULFATE 75 MILLIGRAM(S): 75 TABLET, FILM COATED ORAL at 15:41

## 2017-10-10 RX ADMIN — Medication 2: at 17:04

## 2017-10-10 RX ADMIN — Medication 2: at 22:34

## 2017-10-10 RX ADMIN — OXYCODONE AND ACETAMINOPHEN 1 TABLET(S): 5; 325 TABLET ORAL at 10:22

## 2017-10-10 RX ADMIN — PANTOPRAZOLE SODIUM 40 MILLIGRAM(S): 20 TABLET, DELAYED RELEASE ORAL at 06:10

## 2017-10-10 RX ADMIN — Medication 4 MILLIGRAM(S): at 14:20

## 2017-10-10 RX ADMIN — OXYCODONE AND ACETAMINOPHEN 1 TABLET(S): 5; 325 TABLET ORAL at 10:00

## 2017-10-10 RX ADMIN — Medication 4 MILLIGRAM(S): at 15:42

## 2017-10-10 RX ADMIN — SERTRALINE 25 MILLIGRAM(S): 25 TABLET, FILM COATED ORAL at 15:41

## 2017-10-10 RX ADMIN — HEPARIN SODIUM 5000 UNIT(S): 5000 INJECTION INTRAVENOUS; SUBCUTANEOUS at 22:35

## 2017-10-10 RX ADMIN — OXYCODONE AND ACETAMINOPHEN 1 TABLET(S): 5; 325 TABLET ORAL at 01:02

## 2017-10-10 NOTE — PROGRESS NOTE ADULT - SUBJECTIVE AND OBJECTIVE BOX
Patient is a 66y Male seen and evaluated at bedside. Patient feels better at present. Denies any chest pain, shortness of breath at present. Denies any other complaints at present. Interval placement of left sided chest tube at present.      aspirin enteric coated 81 daily  atorvastatin 40 at bedtime  clopidogrel Tablet 75 daily  dextrose 5%. 1000 <Continuous>  dextrose 50% Injectable 12.5 once  dextrose 50% Injectable 25 once  dextrose 50% Injectable 25 once  dextrose Gel 1 once PRN  glucagon  Injectable 1 once PRN  insulin lispro (HumaLOG) corrective regimen sliding scale  Before meals and at bedtime  oxyCODONE    5 mG/acetaminophen 325 mG 1 every 4 hours PRN  oxyCODONE    5 mG/acetaminophen 325 mG 2 every 6 hours PRN  pantoprazole    Tablet 40 before breakfast  sertraline 25 daily  sodium chloride 0.45%. 1000 <Continuous>  testosterone patch 4 mG/24 Hr(s) 4 daily      Allergies    No Known Allergies    Intolerances        T(C): , Max: 36.7 (10-10-17 @ 05:00)  T(F): , Max: 98 (10-10-17 @ 05:00)  HR: 69 (10-10-17 @ 11:25)  BP: 122/70 (10-10-17 @ 11:25)  BP(mean): 102 (10-10-17 @ 11:00)  RR: 16 (10-10-17 @ 11:25)  SpO2: 97% (10-10-17 @ 11:00)  Wt(kg): --    10-09 @ 07:01  -  10-10 @ 07:00  --------------------------------------------------------  IN: 825 mL / OUT: 1760 mL / NET: -935 mL          Review of Systems:  CONSTITUTIONAL: No fever or chills, No fatigue or tiredness.  EYES: No blurred or double vision.  RESPIRATORY: No shortness of breath, cough, hemoptysis  CARDIOVASCULAR: No Chest pain or shortness of breath  GASTROINTESTINAL: NO abdominal or flank pain, No nausea or vomiting, No diarrhea  GENITOURINARY: No dysuria or urinary burning, No difficulty passing urine, No hematuria  NEUROLOGICAL: No headaches or blurred vision  SKIN: No skin rashes   MUSCULOSKELETAL: No arthralgia, Joint pain, leg edema, No muscle pains      PHYSICAL EXAM:  GENERAL: NAD, well-developed, well nourished, alert, awake, no acute distress at present  HEAD:  Atraumatic, Normocephalic,   EYES: Bilateral conjuctiva and sclera normal   Oral cavity: Oral mucosa dry and pale  NECK: Neck supple, No JVD, Right IJ permacath  CHEST/LUNG: Clear to auscultation bilaterally; No wheeze, no rales, no crepitations, Left sided chest tube present.  HEART: Regular rate and rhythm. AGUEDA II/VI at LPSB, No gallop, no rub, surgical scar present. NO bleeding  ABDOMEN: Soft, Nontender, BS+nt, No flank tenderness.   EXTREMITIES: No clubbing, cyanosis, or edema  Neurology: AAOx3, no focal neurological deficit  SKIN: No rashes or lesions          ACCESS: RIght IJ permacath present.    LABS:                        10.2   8.8   )-----------( 151      ( 10 Oct 2017 03:11 )             30.7     10-10    133<L>  |  95<L>  |  39<H>  ----------------------------<  161<H>  4.0   |  26  |  2.43<H>    Ca    8.4      10 Oct 2017 03:11  Phos  3.3     10-10  Mg     2.3     10-10    TPro  5.8<L>  /  Alb  2.9<L>  /  TBili  1.3<H>  /  DBili  x   /  AST  58<H>  /  ALT  28  /  AlkPhos  93  10-10      PT/INR - ( 10 Oct 2017 03:11 )   PT: 12.4 sec;   INR: 1.11          PTT - ( 10 Oct 2017 03:11 )  PTT:35.9 sec          RADIOLOGY & ADDITIONAL STUDIES:    < from: Xray Chest 1 View AP -PORTABLE-Routine (10.10.17 @ 04:29) >    EXAM:  XR CHEST 1 VIEW PORT ROUTINE                          PROCEDURE DATE:  10/10/2017                     INTERPRETATION:  Portable exam    History: Follow-up abnormal exam    Bibasilar infiltrate/focal atelectasis appearing/increasing since prior   exam 10/9/2017. No other change. Tubes and catheters again noted.            "Thank you for the opportunity to participate in the care of this   patient."        KANIKA ARELLANO M.D., ATTENDING RADIOLOGIST  This document has been electronically signed. Oct 10 2017 11:33AM                  < end of copied text >

## 2017-10-10 NOTE — PROGRESS NOTE ADULT - SUBJECTIVE AND OBJECTIVE BOX
S/p OPCAB X3 on 10/6/2017  EF 30%  Renal failure now on new onset HD    PMH :  [facility]  Prostatic cancer  Diabetes mellitus  HTN (hypertension)  3-vessel CAD  Anemia  HTN (hypertension)  Coronary artery disease  Hyponatremia  Acute renal failure  CABG using saphenous vein graft  No significant past surgical history    ICU Vital Signs Last 24 Hrs  T(C): 36.6 (10-10-17 @ 09:00), Max: 36.7 (10-10-17 @ 05:00)  T(F): 97.8 (10-10-17 @ 09:00), Max: 98 (10-10-17 @ 05:00)  HR: 68 (10-10-17 @ 11:00) (64 - 72)  BP: 122/70 (10-10-17 @ 11:00) (103/63 - 122/71)  BP(mean): 102 (10-10-17 @ 11:00) (74 - 102)  RR: 17 (10-10-17 @ 11:00) (14 - 23)  SpO2: 97% (10-10-17 @ 11:00) (93% - 100%)    I&O's Summary    09 Oct 2017 07:01  -  10 Oct 2017 07:00  --------------------------------------------------------  IN: 825 mL / OUT: 1760 mL / NET: -935 m l                        10.2   8.8   )-----------( 151      ( 10 Oct 2017 03:11 )             30.7     10 Oct 2017 03:11    133    |  95     |  39     ----------------------------<  161    4.0     |  26     |  2.43     Ca    8.4        10 Oct 2017 03:11  Phos  3.3       10 Oct 2017 03:11  Mg     2.3       10 Oct 2017 03:11    TPro  5.8    /  Alb  2.9    /  TBili  1.3    /  DBili  x      /  AST  58     /  ALT  28     /  AlkPhos  93     10 Oct 2017 03:11    PT/INR - ( 10 Oct 2017 03:11 )   PT: 12.4 sec;   INR: 1.11     PTT - ( 10 Oct 2017 03:11 )  PTT:35.9 sec    MEDICATIONS  (STANDING):  aspirin enteric coated 81 milliGRAM(s) Oral daily  atorvastatin 40 milliGRAM(s) Oral at bedtime  clopidogrel Tablet 75 milliGRAM(s) Oral daily  insulin lispro (HumaLOG) corrective regimen sliding scale   SubCutaneous Before meals and at bedtime  pantoprazole    Tablet 40 milliGRAM(s) Oral before breakfast  sertraline 25 milliGRAM(s) Oral daily  sodium chloride 0.45%. 1000 milliLiter(s) IV Continuous <Continuous>  testosterone patch 4 mG/24 Hr(s) 4 milliGRAM(s) Transdermal daily    PHYSICAL EXAM:  Gen : no acute distress  Respiratory: decreased in the bases  Cardiovascular: S1 and S2, RRR, no M/G/R  Gastrointestinal: BS+, soft, NT/ND  Extremities: No peripheral edema  Vascular: 2+ peripheral pulses  Neurological: A/O x 3, no focal deficits

## 2017-10-10 NOTE — PROGRESS NOTE ADULT - ASSESSMENT
65yo s/p OPCAB X3, renal failure on HD  S/p Drainage of left sided effusion yesterday  currently getting hemodialysis  If he tolerates HD plan is for transfer to floor  Continue ASA/Plavix  will try to introduce heart failure regimen if BP permits  GI/DVT proph    Critical care time spent 30 min

## 2017-10-10 NOTE — PROGRESS NOTE ADULT - ASSESSMENT
This is 66 year old male with multiple comorbid condition underwent CABG on 10/05. Nephrology consult called for Oliguric YOANDY needing HD treatment. Patient last dialyzed on 10/07/2017 with UF of 0.5 L. Patient creatinine still remain elevated but stable at 2.4 at present with urine output of 650 cc/24 hours at present. Interval placement of left sided chest tube for pleural effusion with 1.1 Liter pleural fluid removal.

## 2017-10-10 NOTE — PROGRESS NOTE ADULT - PROBLEM SELECTOR PLAN 1
Oliguric Teresa mel with elevated but stable creatinine at 2.4 at present   Patient with urine output of 650cc in 24 hours duration.  Patient last dialyzed on 10/07 with UF goal of 0.6 L.  Will do HD treatment with UF goal of 1 Kg today.  Electrolytes acceptable K: 4.0  Avoid nephrotoxic agents  Monitor BMP daily  Strict I/o monitoring  Low K/Low phos renal diet.

## 2017-10-11 LAB
ALBUMIN SERPL ELPH-MCNC: 3.3 G/DL — SIGNIFICANT CHANGE UP (ref 3.3–5)
ALP SERPL-CCNC: 103 U/L — SIGNIFICANT CHANGE UP (ref 40–120)
ALT FLD-CCNC: 24 U/L — SIGNIFICANT CHANGE UP (ref 10–45)
ANION GAP SERPL CALC-SCNC: 10 MMOL/L — SIGNIFICANT CHANGE UP (ref 5–17)
APTT BLD: 38.9 SEC — HIGH (ref 27.5–37.4)
AST SERPL-CCNC: 33 U/L — SIGNIFICANT CHANGE UP (ref 10–40)
BILIRUB SERPL-MCNC: 1.2 MG/DL — SIGNIFICANT CHANGE UP (ref 0.2–1.2)
BUN SERPL-MCNC: 28 MG/DL — HIGH (ref 7–23)
CALCIUM SERPL-MCNC: 8.5 MG/DL — SIGNIFICANT CHANGE UP (ref 8.4–10.5)
CHLORIDE SERPL-SCNC: 96 MMOL/L — SIGNIFICANT CHANGE UP (ref 96–108)
CO2 SERPL-SCNC: 29 MMOL/L — SIGNIFICANT CHANGE UP (ref 22–31)
CREAT SERPL-MCNC: 1.93 MG/DL — HIGH (ref 0.5–1.3)
GLUCOSE BLDC GLUCOMTR-MCNC: 154 MG/DL — HIGH (ref 70–99)
GLUCOSE BLDC GLUCOMTR-MCNC: 163 MG/DL — HIGH (ref 70–99)
GLUCOSE BLDC GLUCOMTR-MCNC: 184 MG/DL — HIGH (ref 70–99)
GLUCOSE BLDC GLUCOMTR-MCNC: 90 MG/DL — SIGNIFICANT CHANGE UP (ref 70–99)
GLUCOSE SERPL-MCNC: 162 MG/DL — HIGH (ref 70–99)
HCT VFR BLD CALC: 30 % — LOW (ref 39–50)
HGB BLD-MCNC: 10.3 G/DL — LOW (ref 13–17)
INR BLD: 1.05 — SIGNIFICANT CHANGE UP (ref 0.88–1.16)
MAGNESIUM SERPL-MCNC: 2.1 MG/DL — SIGNIFICANT CHANGE UP (ref 1.6–2.6)
MCHC RBC-ENTMCNC: 31 PG — SIGNIFICANT CHANGE UP (ref 27–34)
MCHC RBC-ENTMCNC: 34.3 G/DL — SIGNIFICANT CHANGE UP (ref 32–36)
MCV RBC AUTO: 90.4 FL — SIGNIFICANT CHANGE UP (ref 80–100)
PHOSPHATE SERPL-MCNC: 2.4 MG/DL — LOW (ref 2.5–4.5)
PLATELET # BLD AUTO: 165 K/UL — SIGNIFICANT CHANGE UP (ref 150–400)
POTASSIUM SERPL-MCNC: 3.8 MMOL/L — SIGNIFICANT CHANGE UP (ref 3.5–5.3)
POTASSIUM SERPL-SCNC: 3.8 MMOL/L — SIGNIFICANT CHANGE UP (ref 3.5–5.3)
PROT SERPL-MCNC: 6.1 G/DL — SIGNIFICANT CHANGE UP (ref 6–8.3)
PROTHROM AB SERPL-ACNC: 11.7 SEC — SIGNIFICANT CHANGE UP (ref 9.8–12.7)
RBC # BLD: 3.32 M/UL — LOW (ref 4.2–5.8)
RBC # FLD: 13.9 % — SIGNIFICANT CHANGE UP (ref 10.3–16.9)
SODIUM SERPL-SCNC: 135 MMOL/L — SIGNIFICANT CHANGE UP (ref 135–145)
WBC # BLD: 8.8 K/UL — SIGNIFICANT CHANGE UP (ref 3.8–10.5)
WBC # FLD AUTO: 8.8 K/UL — SIGNIFICANT CHANGE UP (ref 3.8–10.5)

## 2017-10-11 PROCEDURE — 99233 SBSQ HOSP IP/OBS HIGH 50: CPT

## 2017-10-11 PROCEDURE — 93010 ELECTROCARDIOGRAM REPORT: CPT

## 2017-10-11 PROCEDURE — 99232 SBSQ HOSP IP/OBS MODERATE 35: CPT | Mod: GC

## 2017-10-11 PROCEDURE — 71010: CPT | Mod: 26

## 2017-10-11 RX ORDER — LOPERAMIDE HCL 2 MG
2 TABLET ORAL EVERY 8 HOURS
Qty: 0 | Refills: 0 | Status: DISCONTINUED | OUTPATIENT
Start: 2017-10-11 | End: 2017-10-13

## 2017-10-11 RX ORDER — CARVEDILOL PHOSPHATE 80 MG/1
3.12 CAPSULE, EXTENDED RELEASE ORAL EVERY 12 HOURS
Qty: 0 | Refills: 0 | Status: DISCONTINUED | OUTPATIENT
Start: 2017-10-11 | End: 2017-10-12

## 2017-10-11 RX ADMIN — HEPARIN SODIUM 5000 UNIT(S): 5000 INJECTION INTRAVENOUS; SUBCUTANEOUS at 18:07

## 2017-10-11 RX ADMIN — Medication 4 MILLIGRAM(S): at 09:15

## 2017-10-11 RX ADMIN — OXYCODONE AND ACETAMINOPHEN 2 TABLET(S): 5; 325 TABLET ORAL at 04:52

## 2017-10-11 RX ADMIN — SERTRALINE 25 MILLIGRAM(S): 25 TABLET, FILM COATED ORAL at 09:15

## 2017-10-11 RX ADMIN — HEPARIN SODIUM 5000 UNIT(S): 5000 INJECTION INTRAVENOUS; SUBCUTANEOUS at 06:47

## 2017-10-11 RX ADMIN — CLOPIDOGREL BISULFATE 75 MILLIGRAM(S): 75 TABLET, FILM COATED ORAL at 09:15

## 2017-10-11 RX ADMIN — CARVEDILOL PHOSPHATE 3.12 MILLIGRAM(S): 80 CAPSULE, EXTENDED RELEASE ORAL at 18:09

## 2017-10-11 RX ADMIN — Medication 2: at 11:05

## 2017-10-11 RX ADMIN — Medication 81 MILLIGRAM(S): at 09:14

## 2017-10-11 RX ADMIN — OXYCODONE AND ACETAMINOPHEN 2 TABLET(S): 5; 325 TABLET ORAL at 05:52

## 2017-10-11 RX ADMIN — LIDOCAINE 1 PATCH: 4 CREAM TOPICAL at 13:07

## 2017-10-11 RX ADMIN — Medication 2: at 07:44

## 2017-10-11 RX ADMIN — PANTOPRAZOLE SODIUM 40 MILLIGRAM(S): 20 TABLET, DELAYED RELEASE ORAL at 06:47

## 2017-10-11 RX ADMIN — Medication 2 MILLIGRAM(S): at 10:38

## 2017-10-11 RX ADMIN — LIDOCAINE 1 PATCH: 4 CREAM TOPICAL at 09:15

## 2017-10-11 RX ADMIN — Medication 2: at 16:32

## 2017-10-11 RX ADMIN — ATORVASTATIN CALCIUM 40 MILLIGRAM(S): 80 TABLET, FILM COATED ORAL at 21:10

## 2017-10-11 NOTE — ADVANCED PRACTICE NURSE CONSULT - ASSESSMENT
65yo s/p OPCAB X3, renal failure on HD. Pt experiencing multiple episodes of loose stool, erythema noted to bilat buttocks near gluteal cleft. No skin breakdown noted, pt sitting on waffle cushion.

## 2017-10-11 NOTE — PROGRESS NOTE ADULT - SUBJECTIVE AND OBJECTIVE BOX
Patient is a 66y Male seen and evaluated at bedside. Patient feels better today.  Patient complaints of loose stool for few episodes. Denies any chest pain, shortness of breath at present. Interval removal of left chest tube. Denies any other complaints at present.      aspirin enteric coated 81 daily  atorvastatin 40 at bedtime  carvedilol 3.125 every 12 hours  clopidogrel Tablet 75 daily  dextrose 5%. 1000 <Continuous>  dextrose 50% Injectable 12.5 once  dextrose 50% Injectable 25 once  dextrose 50% Injectable 25 once  dextrose Gel 1 once PRN  glucagon  Injectable 1 once PRN  heparin  Injectable 5000 every 12 hours  insulin lispro (HumaLOG) corrective regimen sliding scale  Before meals and at bedtime  lidocaine   Patch 1 daily  loperamide 2 every 8 hours PRN  oxyCODONE    5 mG/acetaminophen 325 mG 1 every 4 hours PRN  oxyCODONE    5 mG/acetaminophen 325 mG 2 every 6 hours PRN  pantoprazole    Tablet 40 before breakfast  sertraline 25 daily  sodium chloride 0.45%. 1000 <Continuous>  testosterone patch 4 mG/24 Hr(s) 4 daily      Allergies    No Known Allergies    Intolerances        T(C): , Max: 37.4 (10-11-17 @ 01:00)  T(F): , Max: 99.3 (10-11-17 @ 01:00)  HR: 68 (10-11-17 @ 08:54)  BP: 104/61 (10-11-17 @ 08:54)  BP(mean): 78 (10-11-17 @ 08:54)  RR: 16 (10-11-17 @ 08:54)  SpO2: 97% (10-11-17 @ 08:54)  Wt(kg): --    10-10 @ 07:01  -  10-11 @ 07:00  --------------------------------------------------------  IN: 400 mL / OUT: 1425 mL / NET: -1025 mL    10-11 @ 07:01  -  10-11 @ 11:16  --------------------------------------------------------  IN: 0 mL / OUT: 800 mL / NET: -800 mL          Review of Systems:  CONSTITUTIONAL: No fever or chills, No fatigue or tiredness.  EYES: No blurred or double vision.  RESPIRATORY: Denies any shortness of breath or cough or, hemoptysis  CARDIOVASCULAR: No Chest pain or shortness of breath  GASTROINTESTINAL: diarrhea and loose stool, denies any abdominal or flank pain, No nausea or vomiting  GENITOURINARY: Difficulty passing urine due to position, No dysuria or urinary burning,No hematuria  NEUROLOGICAL: No headaches or blurred vision  SKIN: No skin rashes   MUSCULOSKELETAL: No arthralgia, Joint pain, leg edema, No muscle pains      PHYSICAL EXAM:  GENERAL: NAD, well-developed, well nourished, alert, awake, no acute distress at present  HEAD:  Atraumatic, Normocephalic,   EYES: Bilateral conjuctival and scleral pallor+nt  Oral cavity: Oral mucosa dry and pale  NECK: Neck supple, No JVD  CHEST/LUNG: Bilateral air entry mildly decreased at bases, no rhonchi, no wheezing, no crepitations,  HEART: Regular rate and rhythm. AGUEDA II/VI at LPSB, No gallop, no rub, Surgical scar present. No bleeding or discharge.  ABDOMEN: Soft, Nontender, BS+nt, No flank tenderness.   EXTREMITIES: No clubbing, cyanosis, or edema  Neurology: AAOx3, no focal neurological deficit  SKIN: No rashes or lesions          ACCESS: Right IJ permacath present. No bleeding or inflammation    LABS:                        10.3   8.8   )-----------( 165      ( 11 Oct 2017 02:30 )             30.0     10-11    135  |  96  |  28<H>  ----------------------------<  162<H>  3.8   |  29  |  1.93<H>    Ca    8.5      11 Oct 2017 02:30  Phos  2.4     10-11  Mg     2.1     10-11    TPro  6.1  /  Alb  3.3  /  TBili  1.2  /  DBili  x   /  AST  33  /  ALT  24  /  AlkPhos  103  10-11      PT/INR - ( 11 Oct 2017 02:30 )   PT: 11.7 sec;   INR: 1.05          PTT - ( 11 Oct 2017 02:30 )  PTT:38.9 sec          RADIOLOGY & ADDITIONAL STUDIES:  < from: Xray Chest 1 View AP -PORTABLE-Routine (10.10.17 @ 04:29) >    EXAM:  XR CHEST 1 VIEW PORT ROUTINE                          PROCEDURE DATE:  10/10/2017                     INTERPRETATION:  Portable exam    History: Follow-up abnormal exam    Bibasilar infiltrate/focal atelectasis appearing/increasing since prior   exam 10/9/2017. No other change. Tubes and catheters again noted.            "Thank you for the opportunity to participate in the care of this   patient."        KANIKA ARELLANO M.D., ATTENDING RADIOLOGIST  This document has been electronically signed. Oct 10 2017 11:33AM                  < end of copied text >

## 2017-10-11 NOTE — PROGRESS NOTE ADULT - ASSESSMENT
This is 66 year old male with multiple comorbid condition underwent CABG on 10/05. Nephrology consult called for Oliguric YOANDY needing HD treatment. Patient last dialyzed on 10/10/2017 with UF of 1 L net and tolerated procedure well. Interval removal of left chest tube drain. Chest xray with atelectesis and small effusion. Creatinine post HD today 1.9 at present. Complaints of diarrhea with loose bowel movements. Patient with bladder scan with significant urine collection of 700 cc for now. Straight cath done with urine output of 800 cc for now.

## 2017-10-11 NOTE — ADVANCED PRACTICE NURSE CONSULT - RECOMMEDATIONS
Recommend Cavilon barrier wipes and moisture barrier ointment to perineum after every loose stool. Spoke with MAXIMINO Cochran.

## 2017-10-11 NOTE — PROGRESS NOTE ADULT - PROBLEM SELECTOR PLAN 1
Non oliguric YOANDY with significant post void residual >700 Cc today morning.   Straight cath done in AM with significant urine output of 800 cc for now.  No need for HD treatment for today.  Monitor vital signs  Monitor for urinary retention via bladder scan with intermittent straight cath as needed. If recurrent retention consider urology evaluation.  Avoid nephrotoxic agents  Monitor BMP daily  Strict I/o monitoring  Low K/Low phos renal diet.  Stool studies  Avoid dehydration/hypotension.

## 2017-10-12 ENCOUNTER — TRANSCRIPTION ENCOUNTER (OUTPATIENT)
Age: 67
End: 2017-10-12

## 2017-10-12 LAB
ALBUMIN SERPL ELPH-MCNC: 3 G/DL — LOW (ref 3.3–5)
ALBUMIN SERPL ELPH-MCNC: 3.3 G/DL — SIGNIFICANT CHANGE UP (ref 3.3–5)
ALP SERPL-CCNC: 104 U/L — SIGNIFICANT CHANGE UP (ref 40–120)
ALP SERPL-CCNC: 89 U/L — SIGNIFICANT CHANGE UP (ref 40–120)
ALT FLD-CCNC: 18 U/L — SIGNIFICANT CHANGE UP (ref 10–45)
ALT FLD-CCNC: 18 U/L — SIGNIFICANT CHANGE UP (ref 10–45)
ANION GAP SERPL CALC-SCNC: 12 MMOL/L — SIGNIFICANT CHANGE UP (ref 5–17)
ANION GAP SERPL CALC-SCNC: 13 MMOL/L — SIGNIFICANT CHANGE UP (ref 5–17)
APTT BLD: 32.4 SEC — SIGNIFICANT CHANGE UP (ref 27.5–37.4)
APTT BLD: 36.9 SEC — SIGNIFICANT CHANGE UP (ref 27.5–37.4)
AST SERPL-CCNC: 23 U/L — SIGNIFICANT CHANGE UP (ref 10–40)
AST SERPL-CCNC: 28 U/L — SIGNIFICANT CHANGE UP (ref 10–40)
BILIRUB SERPL-MCNC: 1.2 MG/DL — SIGNIFICANT CHANGE UP (ref 0.2–1.2)
BILIRUB SERPL-MCNC: 1.2 MG/DL — SIGNIFICANT CHANGE UP (ref 0.2–1.2)
BLD GP AB SCN SERPL QL: NEGATIVE — SIGNIFICANT CHANGE UP
BUN SERPL-MCNC: 32 MG/DL — HIGH (ref 7–23)
BUN SERPL-MCNC: 38 MG/DL — HIGH (ref 7–23)
CALCIUM SERPL-MCNC: 8.4 MG/DL — SIGNIFICANT CHANGE UP (ref 8.4–10.5)
CALCIUM SERPL-MCNC: 8.5 MG/DL — SIGNIFICANT CHANGE UP (ref 8.4–10.5)
CHLORIDE SERPL-SCNC: 92 MMOL/L — LOW (ref 96–108)
CHLORIDE SERPL-SCNC: 95 MMOL/L — LOW (ref 96–108)
CO2 SERPL-SCNC: 25 MMOL/L — SIGNIFICANT CHANGE UP (ref 22–31)
CO2 SERPL-SCNC: 26 MMOL/L — SIGNIFICANT CHANGE UP (ref 22–31)
CREAT SERPL-MCNC: 2.12 MG/DL — HIGH (ref 0.5–1.3)
CREAT SERPL-MCNC: 2.22 MG/DL — HIGH (ref 0.5–1.3)
GLUCOSE BLDC GLUCOMTR-MCNC: 123 MG/DL — HIGH (ref 70–99)
GLUCOSE BLDC GLUCOMTR-MCNC: 134 MG/DL — HIGH (ref 70–99)
GLUCOSE BLDC GLUCOMTR-MCNC: 199 MG/DL — HIGH (ref 70–99)
GLUCOSE BLDC GLUCOMTR-MCNC: 218 MG/DL — HIGH (ref 70–99)
GLUCOSE SERPL-MCNC: 117 MG/DL — HIGH (ref 70–99)
GLUCOSE SERPL-MCNC: 233 MG/DL — HIGH (ref 70–99)
HCT VFR BLD CALC: 27 % — LOW (ref 39–50)
HCT VFR BLD CALC: 29.6 % — LOW (ref 39–50)
HGB BLD-MCNC: 9 G/DL — LOW (ref 13–17)
HGB BLD-MCNC: 9.6 G/DL — LOW (ref 13–17)
INR BLD: 1.14 — SIGNIFICANT CHANGE UP (ref 0.88–1.16)
INR BLD: 1.15 — SIGNIFICANT CHANGE UP (ref 0.88–1.16)
MAGNESIUM SERPL-MCNC: 2 MG/DL — SIGNIFICANT CHANGE UP (ref 1.6–2.6)
MAGNESIUM SERPL-MCNC: 2.1 MG/DL — SIGNIFICANT CHANGE UP (ref 1.6–2.6)
MCHC RBC-ENTMCNC: 29.9 PG — SIGNIFICANT CHANGE UP (ref 27–34)
MCHC RBC-ENTMCNC: 30.4 PG — SIGNIFICANT CHANGE UP (ref 27–34)
MCHC RBC-ENTMCNC: 32.4 G/DL — SIGNIFICANT CHANGE UP (ref 32–36)
MCHC RBC-ENTMCNC: 33.3 G/DL — SIGNIFICANT CHANGE UP (ref 32–36)
MCV RBC AUTO: 91.2 FL — SIGNIFICANT CHANGE UP (ref 80–100)
MCV RBC AUTO: 92.2 FL — SIGNIFICANT CHANGE UP (ref 80–100)
PHOSPHATE SERPL-MCNC: 2.7 MG/DL — SIGNIFICANT CHANGE UP (ref 2.5–4.5)
PHOSPHATE SERPL-MCNC: 2.9 MG/DL — SIGNIFICANT CHANGE UP (ref 2.5–4.5)
PLATELET # BLD AUTO: 155 K/UL — SIGNIFICANT CHANGE UP (ref 150–400)
PLATELET # BLD AUTO: 186 K/UL — SIGNIFICANT CHANGE UP (ref 150–400)
POTASSIUM SERPL-MCNC: 3.8 MMOL/L — SIGNIFICANT CHANGE UP (ref 3.5–5.3)
POTASSIUM SERPL-MCNC: 4 MMOL/L — SIGNIFICANT CHANGE UP (ref 3.5–5.3)
POTASSIUM SERPL-SCNC: 3.8 MMOL/L — SIGNIFICANT CHANGE UP (ref 3.5–5.3)
POTASSIUM SERPL-SCNC: 4 MMOL/L — SIGNIFICANT CHANGE UP (ref 3.5–5.3)
PROT SERPL-MCNC: 5.5 G/DL — LOW (ref 6–8.3)
PROT SERPL-MCNC: 6 G/DL — SIGNIFICANT CHANGE UP (ref 6–8.3)
PROTHROM AB SERPL-ACNC: 12.7 SEC — SIGNIFICANT CHANGE UP (ref 9.8–12.7)
PROTHROM AB SERPL-ACNC: 12.8 SEC — HIGH (ref 9.8–12.7)
RBC # BLD: 2.96 M/UL — LOW (ref 4.2–5.8)
RBC # BLD: 3.21 M/UL — LOW (ref 4.2–5.8)
RBC # FLD: 13.1 % — SIGNIFICANT CHANGE UP (ref 10.3–16.9)
RBC # FLD: 13.2 % — SIGNIFICANT CHANGE UP (ref 10.3–16.9)
RH IG SCN BLD-IMP: POSITIVE — SIGNIFICANT CHANGE UP
SODIUM SERPL-SCNC: 130 MMOL/L — LOW (ref 135–145)
SODIUM SERPL-SCNC: 133 MMOL/L — LOW (ref 135–145)
WBC # BLD: 6.6 K/UL — SIGNIFICANT CHANGE UP (ref 3.8–10.5)
WBC # BLD: 7.3 K/UL — SIGNIFICANT CHANGE UP (ref 3.8–10.5)
WBC # FLD AUTO: 6.6 K/UL — SIGNIFICANT CHANGE UP (ref 3.8–10.5)
WBC # FLD AUTO: 7.3 K/UL — SIGNIFICANT CHANGE UP (ref 3.8–10.5)

## 2017-10-12 PROCEDURE — 70450 CT HEAD/BRAIN W/O DYE: CPT | Mod: 26

## 2017-10-12 PROCEDURE — 99232 SBSQ HOSP IP/OBS MODERATE 35: CPT | Mod: GC

## 2017-10-12 PROCEDURE — 99291 CRITICAL CARE FIRST HOUR: CPT

## 2017-10-12 RX ORDER — TAMSULOSIN HYDROCHLORIDE 0.4 MG/1
0.4 CAPSULE ORAL AT BEDTIME
Qty: 0 | Refills: 0 | Status: DISCONTINUED | OUTPATIENT
Start: 2017-10-12 | End: 2017-10-13

## 2017-10-12 RX ORDER — MIDODRINE HYDROCHLORIDE 2.5 MG/1
5 TABLET ORAL EVERY 8 HOURS
Qty: 0 | Refills: 0 | Status: DISCONTINUED | OUTPATIENT
Start: 2017-10-12 | End: 2017-10-13

## 2017-10-12 RX ORDER — DESMOPRESSIN ACETATE 0.1 MG/1
30 TABLET ORAL ONCE
Qty: 0 | Refills: 0 | Status: COMPLETED | OUTPATIENT
Start: 2017-10-12 | End: 2017-10-12

## 2017-10-12 RX ADMIN — SERTRALINE 25 MILLIGRAM(S): 25 TABLET, FILM COATED ORAL at 09:07

## 2017-10-12 RX ADMIN — Medication 81 MILLIGRAM(S): at 09:07

## 2017-10-12 RX ADMIN — ATORVASTATIN CALCIUM 40 MILLIGRAM(S): 80 TABLET, FILM COATED ORAL at 21:39

## 2017-10-12 RX ADMIN — HEPARIN SODIUM 5000 UNIT(S): 5000 INJECTION INTRAVENOUS; SUBCUTANEOUS at 05:23

## 2017-10-12 RX ADMIN — LIDOCAINE 1 PATCH: 4 CREAM TOPICAL at 00:10

## 2017-10-12 RX ADMIN — TAMSULOSIN HYDROCHLORIDE 0.4 MILLIGRAM(S): 0.4 CAPSULE ORAL at 09:53

## 2017-10-12 RX ADMIN — DESMOPRESSIN ACETATE 230 MICROGRAM(S): 0.1 TABLET ORAL at 16:11

## 2017-10-12 RX ADMIN — PANTOPRAZOLE SODIUM 40 MILLIGRAM(S): 20 TABLET, DELAYED RELEASE ORAL at 06:03

## 2017-10-12 RX ADMIN — Medication: at 21:26

## 2017-10-12 RX ADMIN — Medication 4 MILLIGRAM(S): at 09:08

## 2017-10-12 RX ADMIN — Medication 2: at 11:56

## 2017-10-12 RX ADMIN — MIDODRINE HYDROCHLORIDE 5 MILLIGRAM(S): 2.5 TABLET ORAL at 18:54

## 2017-10-12 RX ADMIN — Medication 4: at 18:37

## 2017-10-12 RX ADMIN — CLOPIDOGREL BISULFATE 75 MILLIGRAM(S): 75 TABLET, FILM COATED ORAL at 09:07

## 2017-10-12 RX ADMIN — CARVEDILOL PHOSPHATE 3.12 MILLIGRAM(S): 80 CAPSULE, EXTENDED RELEASE ORAL at 05:23

## 2017-10-12 RX ADMIN — HEPARIN SODIUM 5000 UNIT(S): 5000 INJECTION INTRAVENOUS; SUBCUTANEOUS at 18:36

## 2017-10-12 NOTE — DISCHARGE NOTE ADULT - CARE PROVIDER_API CALL
Rivrea Sorenson), Cardiovascular Surgery  [address]  [address] [place] [address]  Phone: [phone]  Fax: [phone]    Alice Rodriguez (MD), Internal Medicine; Nephrology  [address]  [address] [place] [address]  Phone: [phone]  Fax: [phone]    Will Rushing), Urology  [address]  [address]  [place] [address]  Phone: [phone]  Fax: [phone]

## 2017-10-12 NOTE — CONSULT NOTE ADULT - SUBJECTIVE AND OBJECTIVE BOX
Mr. Roldan is a 67 year old gentleman hospitalized for NSTEMI Mr. Roldan is a 67 year old gentleman s/p CABG, prostate CA s/p radiation only in 2010, CKD, DM, BPH, and HTN. Urology was consulted by the primary team for hematuria and copious clots in the Welsh catheter and bag. Per the primary team, Mr. Roldan underwent urinary retention last night s/p Welsh catheter placement. History was unclear regarding if any blood was present in the urine at that time. Mr. Roldan denies any previous episodes of hematuria, dysuria, or any other urinary symptoms.      aspirin enteric coated 81 milliGRAM(s) Oral daily  clopidogrel Tablet 75 milliGRAM(s) Oral daily  heparin  Injectable 5000 Unit(s) SubCutaneous every 12 hours  tamsulosin 0.4 milliGRAM(s) Oral at bedtime      Vital Signs Last 24 Hrs  T(C): 36.6 (12 Oct 2017 14:16), Max: 37.1 (12 Oct 2017 01:00)  T(F): 97.8 (12 Oct 2017 14:16), Max: 98.7 (12 Oct 2017 01:00)  HR: 62 (12 Oct 2017 13:06) (62 - 80)  BP: 97/61 (12 Oct 2017 13:06) (97/58 - 127/63)  BP(mean): 69 (12 Oct 2017 13:06) (67 - 91)  RR: 18 (12 Oct 2017 13:06) (15 - 24)  SpO2: 97% (12 Oct 2017 13:06) (95% - 100%)  I&O's Detail    11 Oct 2017 07:01  -  12 Oct 2017 07:00  --------------------------------------------------------  IN:    Oral Fluid: 450 mL  Total IN: 450 mL    OUT:    Post-Void Residual per Intermittent Catheterization: 800 mL    Stool: 1 mL    Voided: 250 mL  Total OUT: 1051 mL    Total NET: -601 mL      12 Oct 2017 07:01  -  12 Oct 2017 16:04  --------------------------------------------------------  IN:  Total IN: 0 mL    OUT:    Indwelling Catheter - Urethral: 1400 mL    Stool: 1 mL  Total OUT: 1401 mL    Total NET: -1401 mL      General: NAD, resting comfortably in bed  C/V: NSR  Pulm: Nonlabored breathing, no respiratory distress  Abd: soft, NT/ND.  Extrem: WWP, no edema  : Welsh catheter in place, dark red blood in urine with clots.        LABS:                        9.0    6.6   )-----------( 155      ( 12 Oct 2017 02:26 )             27.0     10-12    133<L>  |  95<L>  |  32<H>  ----------------------------<  117<H>  3.8   |  25  |  2.12<H>    Ca    8.5      12 Oct 2017 02:26  Phos  2.7     10-12  Mg     2.1     10-12    TPro  5.5<L>  /  Alb  3.0<L>  /  TBili  1.2  /  DBili  x   /  AST  23  /  ALT  18  /  AlkPhos  89  10-12    PT/INR - ( 12 Oct 2017 02:26 )   PT: 12.7 sec;   INR: 1.14          PTT - ( 12 Oct 2017 02:26 )  PTT:36.9 sec

## 2017-10-12 NOTE — PROGRESS NOTE ADULT - SUBJECTIVE AND OBJECTIVE BOX
CTICU  CRITICAL  CARE  attending     Hand off received 					   Pertinent clinical, laboratory, radiographic, hemodynamic, echocardiographic, respiratory data, microbiologic data and chart were reviewed and analyzed frequently throughout the course of the day and night  Patient seen and examined with CTS/ SH attending at bedside  Pt is a 67y , Male, HEALTH ISSUES - PROBLEM Dx:  Anemia: Anemia  HTN (hypertension): HTN (hypertension)  Coronary artery disease: Coronary artery disease  Hyponatremia: Hyponatremia  Acute renal failure: Acute renal failure      , FAMILY HISTORY:  No pertinent family history in first degree relatives  PAST MEDICAL & SURGICAL HISTORY:  Prostatic cancer  Diabetes mellitus  HTN (hypertension)  No significant past surgical history    Patient is a 67y old  Male who presents with a chief complaint of CAD (30 Sep 2017 17:19)      14 system review was unremarkable  acute changes include acute respiratory failure  Vital signs, hemodynamic and respiratory parameters were reviewed from the bedside nursing flowsheet.  ICU Vital Signs Last 24 Hrs  T(C): 36.7 (12 Oct 2017 21:05), Max: 37.1 (12 Oct 2017 01:00)  T(F): 98 (12 Oct 2017 21:05), Max: 98.7 (12 Oct 2017 01:00)  HR: 70 (12 Oct 2017 23:00) (62 - 78)  BP: 112/59 (12 Oct 2017 23:00) (87/60 - 112/69)  BP(mean): 90 (12 Oct 2017 23:00) (68 - 90)  ABP: --  ABP(mean): --  RR: 21 (12 Oct 2017 20:00) (15 - 24)  SpO2: 95% (12 Oct 2017 20:00) (95% - 100%)    Adult Advanced Hemodynamics Last 24 Hrs  CVP(mm Hg): --  CVP(cm H2O): --  CO: --  CI: --  PA: --  PA(mean): --  PCWP: --  SVR: --  SVRI: --  PVR: --  PVRI: --,     Intake and output was reviewed and the fluid balance was calculated  Daily     Daily Weight in k.1 (12 Oct 2017 11:04)  I&O's Summary    11 Oct 2017 07:01  -  12 Oct 2017 07:00  --------------------------------------------------------  IN: 450 mL / OUT: 1051 mL / NET: -601 mL    12 Oct 2017 07:01  -  12 Oct 2017 23:44  --------------------------------------------------------  IN: 50 mL / OUT: 1562 mL / NET: -1512 mL        All lines and drain sites were assessed  Glycemic trend was reviewedCAPILLARY BLOOD GLUCOSE  123 (12 Oct 2017 05:27)      POCT Blood Glucose.: 134 mg/dL (12 Oct 2017 21:26)    No acute change in mental status  Auscultation of the chest reveals equal bs  Abdomen is soft  Extremities are warm and well perfused  Wounds appear clean and unremarkable  Antibiotics are periop    labs  CBC Full  -  ( 12 Oct 2017 18:52 )  WBC Count : 7.3 K/uL  Hemoglobin : 9.6 g/dL  Hematocrit : 29.6 %  Platelet Count - Automated : 186 K/uL  Mean Cell Volume : 92.2 fL  Mean Cell Hemoglobin : 29.9 pg  Mean Cell Hemoglobin Concentration : 32.4 g/dL  Auto Neutrophil # : x  Auto Lymphocyte # : x  Auto Monocyte # : x  Auto Eosinophil # : x  Auto Basophil # : x  Auto Neutrophil % : x  Auto Lymphocyte % : x  Auto Monocyte % : x  Auto Eosinophil % : x  Auto Basophil % : x    10-12    130<L>  |  92<L>  |  38<H>  ----------------------------<  233<H>  4.0   |  26  |  2.22<H>    Ca    8.4      12 Oct 2017 18:50  Phos  2.9     10-12  Mg     2.0     10-12    TPro  6.0  /  Alb  3.3  /  TBili  1.2  /  DBili  x   /  AST  28  /  ALT  18  /  AlkPhos  104  10-12    PT/INR - ( 12 Oct 2017 18:50 )   PT: 12.8 sec;   INR: 1.15          PTT - ( 12 Oct 2017 18:50 )  PTT:32.4 sec  The current medications were reviewed   MEDICATIONS  (STANDING):  aspirin enteric coated 81 milliGRAM(s) Oral daily  atorvastatin 40 milliGRAM(s) Oral at bedtime  clopidogrel Tablet 75 milliGRAM(s) Oral daily  dextrose 5%. 1000 milliLiter(s) (50 mL/Hr) IV Continuous <Continuous>  dextrose 50% Injectable 12.5 Gram(s) IV Push once  dextrose 50% Injectable 25 Gram(s) IV Push once  dextrose 50% Injectable 25 Gram(s) IV Push once  heparin  Injectable 5000 Unit(s) SubCutaneous every 12 hours  insulin lispro (HumaLOG) corrective regimen sliding scale   SubCutaneous Before meals and at bedtime  lidocaine   Patch 1 Patch Transdermal daily  midodrine 5 milliGRAM(s) Oral every 8 hours  pantoprazole    Tablet 40 milliGRAM(s) Oral before breakfast  sertraline 25 milliGRAM(s) Oral daily  sodium chloride 0.45%. 1000 milliLiter(s) (10 mL/Hr) IV Continuous <Continuous>  tamsulosin 0.4 milliGRAM(s) Oral at bedtime  testosterone patch 4 mG/24 Hr(s) 4 milliGRAM(s) Transdermal daily    MEDICATIONS  (PRN):  dextrose Gel 1 Dose(s) Oral once PRN Blood Glucose LESS THAN 70 milliGRAM(s)/deciliter  glucagon  Injectable 1 milliGRAM(s) IntraMuscular once PRN Glucose LESS THAN 70 milligrams/deciliter  loperamide 2 milliGRAM(s) Oral every 8 hours PRN Diarrhea  oxyCODONE    5 mG/acetaminophen 325 mG 1 Tablet(s) Oral every 4 hours PRN Moderate Pain (4 - 6)  oxyCODONE    5 mG/acetaminophen 325 mG 2 Tablet(s) Oral every 6 hours PRN Severe Pain (7 - 10)       PROBLEM LIST/ ASSESSMENT:  HEALTH ISSUES - PROBLEM Dx:  Anemia: Anemia  HTN (hypertension): HTN (hypertension)  Coronary artery disease: Coronary artery disease  Hyponatremia: Hyponatremia  Acute renal failure: Acute renal failure      ,   Patient is a 67y old  Male who presents with a chief complaint of CAD (30 Sep 2017 17:19)     s/p   acute changes include acute respiratory failure    My plan includes :  close hemodynamic, ventilatory and drain monitoring and management per post op routine    Monitor for arrhythmias and monitor parameters for organ perfusion  monitor neurologic status  Head of the bed should remain elevated to 45 deg .   chest PT and IS will be encouraged  monitor adequacy of oxygenation and ventilation and attempt to wean oxygen  Nutritional goals will be met using po eventually , ensure adequate caloric intake and montior the same  Stress ulcer and VTE prophylaxis will be achieved    Glycemic control is satisfactory  Electrolytes have been repleted as necessary and wound care has been carried out. Pain control has been achieved.   agressive physical therapy and early mobility and ambulation goals will be met   The family was updated about the course and plan  CRITICAL CARE TIME SPENT in evaluation and management, reassessments, review and interpretation of labs and x-rays, ventilator and hemodynamic management, formulating a plan and coordinating care: ___90____ MIN.  Time does not include procedural time.  CTICU ATTENDING     					    Romie Akbar MD CTICU  CRITICAL  CARE  attending     Hand off received @ 7p					   Pertinent clinical, laboratory, radiographic, hemodynamic, echocardiographic, respiratory data, microbiologic data and chart were reviewed and analyzed frequently throughout the course of the day and night  Patient seen and examined with CTS/ SH attending at bedside    Pt is a 67y , Male, day # 8 s/p off pump CABG x 3V; EF 30%    post Op    Dialysis  lethargy/altered mental status  anemia    today;    CT head Non contrast negative  Borderline BP  coreg d/cd        Anemia: Anemia  HTN (hypertension): HTN (hypertension)  Coronary artery disease: Coronary artery disease  Hyponatremia: Hyponatremia  Acute renal failure: Acute renal failure      , FAMILY HISTORY:  No pertinent family history in first degree relatives  PAST MEDICAL & SURGICAL HISTORY:  Prostatic cancer  Diabetes mellitus  HTN (hypertension)  No significant past surgical history    Patient is a 67y old  Male who presents with a chief complaint of CAD (30 Sep 2017 17:19)      14 system review was unremarkable  acute changes include acute respiratory failure  Vital signs, hemodynamic and respiratory parameters were reviewed from the bedside nursing flowsheet.  ICU Vital Signs Last 24 Hrs  T(C): 36.7 (12 Oct 2017 21:05), Max: 37.1 (12 Oct 2017 01:00)  T(F): 98 (12 Oct 2017 21:05), Max: 98.7 (12 Oct 2017 01:00)  HR: 70 (12 Oct 2017 23:00) (62 - 78)  BP: 112/59 (12 Oct 2017 23:00) (87/60 - 112/69)  BP(mean): 90 (12 Oct 2017 23:00) (68 - 90)  ABP: --  ABP(mean): --  RR: 21 (12 Oct 2017 20:00) (15 - 24)  SpO2: 95% (12 Oct 2017 20:00) (95% - 100%)    Adult Advanced Hemodynamics Last 24 Hrs  CVP(mm Hg): --  CVP(cm H2O): --  CO: --  CI: --  PA: --  PA(mean): --  PCWP: --  SVR: --  SVRI: --  PVR: --  PVRI: --,     Intake and output was reviewed and the fluid balance was calculated  Daily     Daily Weight in k.1 (12 Oct 2017 11:04)  I&O's Summary    11 Oct 2017 07:01  -  12 Oct 2017 07:00  --------------------------------------------------------  IN: 450 mL / OUT: 1051 mL / NET: -601 mL    12 Oct 2017 07:01  -  12 Oct 2017 23:44  --------------------------------------------------------  IN: 50 mL / OUT: 1562 mL / NET: -1512 mL        All lines and drain sites were assessed  Glycemic trend was reviewedCAPILLARY BLOOD GLUCOSE  123 (12 Oct 2017 05:27)      POCT Blood Glucose.: 134 mg/dL (12 Oct 2017 21:26)    No acute change in mental status  Auscultation of the chest reveals equal bs  Abdomen is soft  Extremities are warm and well perfused  Wounds appear clean and unremarkable  Antibiotics are periop    labs  CBC Full  -  ( 12 Oct 2017 18:52 )  WBC Count : 7.3 K/uL  Hemoglobin : 9.6 g/dL  Hematocrit : 29.6 %  Platelet Count - Automated : 186 K/uL  Mean Cell Volume : 92.2 fL  Mean Cell Hemoglobin : 29.9 pg  Mean Cell Hemoglobin Concentration : 32.4 g/dL  Auto Neutrophil # : x  Auto Lymphocyte # : x  Auto Monocyte # : x  Auto Eosinophil # : x  Auto Basophil # : x  Auto Neutrophil % : x  Auto Lymphocyte % : x  Auto Monocyte % : x  Auto Eosinophil % : x  Auto Basophil % : x    10-12    130<L>  |  92<L>  |  38<H>  ----------------------------<  233<H>  4.0   |  26  |  2.22<H>    Ca    8.4      12 Oct 2017 18:50  Phos  2.9     10-12  Mg     2.0     10-12    TPro  6.0  /  Alb  3.3  /  TBili  1.2  /  DBili  x   /  AST  28  /  ALT  18  /  AlkPhos  104  10-12    PT/INR - ( 12 Oct 2017 18:50 )   PT: 12.8 sec;   INR: 1.15          PTT - ( 12 Oct 2017 18:50 )  PTT:32.4 sec  The current medications were reviewed   MEDICATIONS  (STANDING):  aspirin enteric coated 81 milliGRAM(s) Oral daily  atorvastatin 40 milliGRAM(s) Oral at bedtime  clopidogrel Tablet 75 milliGRAM(s) Oral daily  dextrose 5%. 1000 milliLiter(s) (50 mL/Hr) IV Continuous <Continuous>  dextrose 50% Injectable 12.5 Gram(s) IV Push once  dextrose 50% Injectable 25 Gram(s) IV Push once  dextrose 50% Injectable 25 Gram(s) IV Push once  heparin  Injectable 5000 Unit(s) SubCutaneous every 12 hours  insulin lispro (HumaLOG) corrective regimen sliding scale   SubCutaneous Before meals and at bedtime  lidocaine   Patch 1 Patch Transdermal daily  midodrine 5 milliGRAM(s) Oral every 8 hours  pantoprazole    Tablet 40 milliGRAM(s) Oral before breakfast  sertraline 25 milliGRAM(s) Oral daily  sodium chloride 0.45%. 1000 milliLiter(s) (10 mL/Hr) IV Continuous <Continuous>  tamsulosin 0.4 milliGRAM(s) Oral at bedtime  testosterone patch 4 mG/24 Hr(s) 4 milliGRAM(s) Transdermal daily    MEDICATIONS  (PRN):  dextrose Gel 1 Dose(s) Oral once PRN Blood Glucose LESS THAN 70 milliGRAM(s)/deciliter  glucagon  Injectable 1 milliGRAM(s) IntraMuscular once PRN Glucose LESS THAN 70 milligrams/deciliter  loperamide 2 milliGRAM(s) Oral every 8 hours PRN Diarrhea  oxyCODONE    5 mG/acetaminophen 325 mG 1 Tablet(s) Oral every 4 hours PRN Moderate Pain (4 - 6)  oxyCODONE    5 mG/acetaminophen 325 mG 2 Tablet(s) Oral every 6 hours PRN Severe Pain (7 - 10)       PROBLEM LIST/ ASSESSMENT:  HEALTH ISSUES - PROBLEM Dx:    Dialysis  altered mental status  risk of hemodynamic instability      Anemia: Anemia  HTN (hypertension): HTN (hypertension)  Coronary artery disease: Coronary artery disease  Hyponatremia: Hyponatremia  Acute renal failure: Acute renal failure      ,   Patient is a 67y old  Male who presents with a chief complaint of CAD (30 Sep 2017 17:19)     s/p   acute changes include acute respiratory failure    My plan includes :  close hemodynamic, ventilatory and drain monitoring and management per post op routine    Monitor for arrhythmias and monitor parameters for organ perfusion  monitor neurologic status  Head of the bed should remain elevated to 45 deg .   chest PT and IS will be encouraged  monitor adequacy of oxygenation and ventilation and attempt to wean oxygen  Nutritional goals will be met using po eventually , ensure adequate caloric intake and montior the same  Stress ulcer and VTE prophylaxis will be achieved    Glycemic control is satisfactory  Electrolytes have been repleted as necessary and wound care has been carried out. Pain control has been achieved.   agressive physical therapy and early mobility and ambulation goals will be met   The family was updated about the course and plan  CRITICAL CARE TIME SPENT in evaluation and management, reassessments, review and interpretation of labs and x-rays, ventilator and hemodynamic management, formulating a plan and coordinating care: _45___ MIN.  Time does not include procedural time.  CTICU ATTENDING     					    Eric Tripathi MD

## 2017-10-12 NOTE — DISCHARGE NOTE ADULT - PATIENT PORTAL LINK FT
“You can access the [employer] Patient Portal, offered by [facility], by registering with the following website: [email or web address]”

## 2017-10-12 NOTE — DISCHARGE NOTE ADULT - HOSPITAL COURSE
65 y/o M w/ PMH of DM, HLD and prostatic cancer s/p radiation therapy in 2010 was brought in by ambulance after a syncopal episode. Pt was having multiple days of diarrhea after taking laxative for constipation. In the ED at [facility] patient's labs were significant for elevated troponins. Pt was also found to have both YOANDY and hyponatremia which was presumed to be due to gastric losses and both recovered with fluid resuscitation. On 9/29/17 patient underwent cardiac catherization for NSTEMI and was found to have EF of 25% and 3vCAD ( MidLAD 80%, distal LAD 80%, Diagonal 85%, Prox circ 95%, . left PDA 65%, Prox RCA 70, Mid RCA 85% and Distal PCA 90%). Echo demonstrated moderate Tricuspid regurgitation. In light of 3vCAD patient was transferred to [facility] for CABG work up and admitted at [facility] on 9/30/17. Upon admission, pt had low urine output with increased BUN/Cr and nephrology team was consult. 10/1/17, pt was transferred to CTICU for HD and right pigtail placement for pleural effusion. 10/4/17, pt underwent OPCABx3 post-op pt was given PRBC. POD#1, pt was extubated and underwent HD with PRBC. POD#2, permacath was placed by IR and pressors were d/c'ed. POD#5, left pleural pigtail placed -1L serosanguinous fluid. POD#6, left pleural pigtail removed. POD#7, urinary retention noted and pt was straight cathed. POD#8, continued urinary retention and miranda was placed. As per Dr. Sorenson pt stable and ready for discharge to nursing home.     Pt was discharged with miranda in place and will have outpatient follow-up with Dr. Rushing. Pt will also undergo HD and Dr. Foster will be managing outpatient HD regimen. 65 y/o M w/ PMH of DM, HLD and prostatic cancer s/p radiation therapy in 2010 was brought in by ambulance after a syncopal episode. Pt was having multiple days of diarrhea after taking laxative for constipation. In the ED at [facility] patient's labs were significant for elevated troponins. Pt was also found to have both YOANDY and hyponatremia which was presumed to be due to gastric losses and both recovered with fluid resuscitation. On 9/29/17 patient underwent cardiac catherization for NSTEMI and was found to have EF of 25% and 3vCAD ( MidLAD 80%, distal LAD 80%, Diagonal 85%, Prox circ 95%, . left PDA 65%, Prox RCA 70, Mid RCA 85% and Distal PCA 90%). Echo demonstrated moderate Tricuspid regurgitation. In light of 3vCAD patient was transferred to [facility] for CABG work up and admitted at [facility] on 9/30/17. Upon admission, pt had low urine output with increased BUN/Cr and nephrology team was consult. 10/1/17, pt was transferred to CTICU for HD and right pigtail placement for pleural effusion. 10/4/17, pt underwent OPCABx3 post-op pt was given PRBC. POD#1, pt was extubated and underwent HD with PRBC. POD#2, permacath was placed by IR and pressors were d/c'ed. POD#5, left pleural pigtail placed -1L serosanguinous fluid. POD#6, left pleural pigtail removed. POD#7, urinary retention noted and pt was straight cathed. POD#8, continued urinary retention and miranda was placed. As per Dr. Sorenson pt stable and ready for discharge to nursing home. Today midodrine was discontinued. At this time holding off on beta blocker. Patient was on coreg as outpatient. Consider restarting at a later date once blood pressure can tolerate.     Pt was discharged with miranda in place and will have outpatient follow-up with Dr. Rushing. Pt will also undergo HD once he arrives at the nursing home today and out patient dialysis and renal follow up will be set up from there.

## 2017-10-12 NOTE — DISCHARGE NOTE ADULT - CARE PLAN
Principal Discharge DX:	Coronary artery disease  Goal:	s/p OPCAB  Instructions for follow-up, activity and diet:	-Please follow up with Dr. Sorenson on ___.  The office is located at [facility], [facility], 4th floor. Call us with any questions #[phone].  - You will be undergoing dialysis as an outpatient. Dr. Foster will be managing you sessions. Please follow-up with her as an outpatient on. Her contact information is located in your discharge paperwork.  -You have also been discharge to your nursing home facility with a miranda catheter in place. Please follow-up with Dr. Rushing on. For any questions or concerns his contact information is located in your discharge paperwork.   -Walk daily as tolerated and use your incentive spirometer every hour.    -No driving or strenuous activity/exercise for 6 weeks, or until cleared by your surgeon. Please do not lift anything greater than ten pounds.  -Gently clean your incisions with anti-bacterial soap and water, pat dry.  You may leave them open to air.    -Call your doctor if you have shortness of breath, chest pain not relieved by pain medication, dizziness, fever >101.5, or increased redness or drainage from incisions.

## 2017-10-12 NOTE — PROGRESS NOTE ADULT - PROBLEM SELECTOR PLAN 1
Oliguric Teresa post cardiac surgery needing Acute HD treatment now with acute urinary retention. BUN/creatinine still remain elevated with 32/2.1 at present.  Patient also found to have significant post Void residual urine of >800 to 900 cc for now.  Welsh's catheter along with flomax for now due to recurrent urinary retention.  consider Urology evaluation due to recurrent urinary retention.  No need for urgent HD today as no clinical signs or symptoms of fluid overload, electrolytes acceptable, No metabolic acidosis at present.  Patient may still need ongoing dialysis treatment as urine output is still not significant yet.  Will reassess in AM for need for HD treatment tomorrow.

## 2017-10-12 NOTE — DISCHARGE NOTE ADULT - MEDICATION SUMMARY - MEDICATIONS TO TAKE
I will START or STAY ON the medications listed below when I get home from the hospital:    aspirin 81 mg oral delayed release tablet  -- 1 tab(s) by mouth once a day  -- Indication: For Heart medicatin     tamsulosin 0.4 mg oral capsule  -- 1 cap(s) by mouth once a day (at bedtime)  -- Indication: For urinary retention     sertraline 25 mg oral tablet  -- 1 tab(s) by mouth once a day  -- Indication: For Depression medication     glipiZIDE 5 mg oral tablet  -- 1 tab(s) by mouth once a day  -- Indication: For Diabetes medication     atorvastatin 40 mg oral tablet  -- 1 tab(s) by mouth once a day (at bedtime)  -- Indication: For CHolesterol medication    clopidogrel 75 mg oral tablet  -- 1 tab(s) by mouth once a day  -- Indication: For blood thinner    pantoprazole 40 mg oral delayed release tablet  -- 1 tab(s) by mouth once a day (before a meal)  -- Indication: For Acid reducer

## 2017-10-12 NOTE — CHART NOTE - NSCHARTNOTEFT_GEN_A_CORE
As per Dr. Sorenson, epicardial pacing wires removed at bedside prior to discharge. Ventricular epicardial wire cleansed with chlorhexidine and cut at skin level. Patient tolerated procedure well and remain hemodynamically stable.

## 2017-10-12 NOTE — PROGRESS NOTE ADULT - SUBJECTIVE AND OBJECTIVE BOX
Patient is a 67y Male seen and evaluated at bedside. Patient feels fine at present. Denies any chest pain, shortness of breath, palpitations, dizziness at present. Denies any difficulty passing urine at present although had significant post void urine yesterday. Overnight Urine output 250 cc for now.      aspirin enteric coated 81 daily  atorvastatin 40 at bedtime  clopidogrel Tablet 75 daily  dextrose 5%. 1000 <Continuous>  dextrose 50% Injectable 12.5 once  dextrose 50% Injectable 25 once  dextrose 50% Injectable 25 once  dextrose Gel 1 once PRN  glucagon  Injectable 1 once PRN  heparin  Injectable 5000 every 12 hours  insulin lispro (HumaLOG) corrective regimen sliding scale  Before meals and at bedtime  lidocaine   Patch 1 daily  loperamide 2 every 8 hours PRN  oxyCODONE    5 mG/acetaminophen 325 mG 1 every 4 hours PRN  oxyCODONE    5 mG/acetaminophen 325 mG 2 every 6 hours PRN  pantoprazole    Tablet 40 before breakfast  sertraline 25 daily  sodium chloride 0.45%. 1000 <Continuous>  tamsulosin 0.4 at bedtime  testosterone patch 4 mG/24 Hr(s) 4 daily      Allergies    No Known Allergies    Intolerances        T(C): , Max: 37.1 (10-12-17 @ 01:00)  T(F): , Max: 98.7 (10-12-17 @ 01:00)  HR: 64 (10-12-17 @ 08:29)  BP: 103/53 (10-12-17 @ 08:29)  BP(mean): 76 (10-12-17 @ 08:29)  RR: 24 (10-12-17 @ 08:29)  SpO2: 100% (10-12-17 @ 08:29)  Wt(kg): --    10-11 @ 07:01  -  10-12 @ 07:00  --------------------------------------------------------  IN: 450 mL / OUT: 1051 mL / NET: -601 mL    10-12 @ 07:01  -  10-12 @ 11:22  --------------------------------------------------------  IN: 0 mL / OUT: 1 mL / NET: -1 mL          Review of Systems:  CONSTITUTIONAL: No fever or chills, No fatigue or tiredness.  EYES: No blurred or double vision.  RESPIRATORY: No shortness of breath, cough, hemoptysis  CARDIOVASCULAR: No Chest pain or shortness of breath  GASTROINTESTINAL: NO abdominal or flank pain, No nausea or vomiting, No diarrhea  GENITOURINARY: No dysuria or urinary burning, No difficulty passing urine, No hematuria  NEUROLOGICAL: No headaches or blurred vision  SKIN: No skin rashes   MUSCULOSKELETAL: No arthralgia, Joint pain, leg edema, No muscle pains      PHYSICAL EXAM:  GENERAL: NAD, well-developed, well nourished, alert, awake, no acute distress at present  HEAD:  Atraumatic, Normocephalic,   EYES: Bilateral conjuctiva and scleral pallor+nt  Oral cavity: Oral mucosa dry and pale  NECK: Neck supple, No JVD  CHEST/LUNG: Clear to auscultation bilaterally; No wheeze, no rales, no crepitations, surgical scar+nt  HEART: Regular rate and rhythm. AGUEDA II/VI at LPSB, No gallop, no rub   ABDOMEN: Soft, Nontender, BS+nt, No flank tenderness. Mild suprapubic tendernes+nt  EXTREMITIES: No clubbing, cyanosis, or edema  Neurology: AAOx3, no focal neurological deficit  SKIN: No rashes or lesions          ACCESS: Right IJ permacath present.    LABS:                        9.0    6.6   )-----------( 155      ( 12 Oct 2017 02:26 )             27.0     10-12    133<L>  |  95<L>  |  32<H>  ----------------------------<  117<H>  3.8   |  25  |  2.12<H>    Ca    8.5      12 Oct 2017 02:26  Phos  2.7     10-12  Mg     2.1     10-12    TPro  5.5<L>  /  Alb  3.0<L>  /  TBili  1.2  /  DBili  x   /  AST  23  /  ALT  18  /  AlkPhos  89  10-12      PT/INR - ( 12 Oct 2017 02:26 )   PT: 12.7 sec;   INR: 1.14          PTT - ( 12 Oct 2017 02:26 )  PTT:36.9 sec          RADIOLOGY & ADDITIONAL STUDIES:    < from: Xray Chest 1 View AP -PORTABLE-Routine (10.11.17 @ 04:25) >    EXAM:  XR CHEST 1 VIEW PORT ROUTINE                          PROCEDURE DATE:  10/11/2017                     INTERPRETATION:  Portable chest    History: follow up abnormal exam    Possible tiny left apex pneumothorax.    Lower lung infiltrates/atelectasis improving compared to prior exam   10/10/17.    No other change.            "Thank you for the opportunity to participate in the care of this   patient."        KANIKA ARELLANO M.D., ATTENDING RADIOLOGIST  This document has been electronically signed. Oct 11 2017 12:57PM                  < end of copied text >

## 2017-10-12 NOTE — DISCHARGE NOTE ADULT - PLAN OF CARE
s/p OPCAB -Please follow up with Dr. Sorenson on ___.  The office is located at [facility], [facility], 4th floor. Call us with any questions #[phone].  - You will be undergoing dialysis as an outpatient. Dr. Foster will be managing you sessions. Please follow-up with her as an outpatient on. Her contact information is located in your discharge paperwork.  -You have also been discharge to your nursing home facility with a miranda catheter in place. Please follow-up with Dr. Rushing on. For any questions or concerns his contact information is located in your discharge paperwork.   -Walk daily as tolerated and use your incentive spirometer every hour.    -No driving or strenuous activity/exercise for 6 weeks, or until cleared by your surgeon. Please do not lift anything greater than ten pounds.  -Gently clean your incisions with anti-bacterial soap and water, pat dry.  You may leave them open to air.    -Call your doctor if you have shortness of breath, chest pain not relieved by pain medication, dizziness, fever >101.5, or increased redness or drainage from incisions.

## 2017-10-12 NOTE — DISCHARGE NOTE ADULT - CARE PROVIDERS DIRECT ADDRESSES
[email or web address]

## 2017-10-12 NOTE — PROGRESS NOTE ADULT - ASSESSMENT
This is 66 year old male with multiple comorbid condition underwent CABG on 10/05. Nephrology consult called for Oliguric YOANDY needing HD treatment. Patient last dialyzed on 10/07/2017 with UF of 0.5 L. Patient with significant Post Void urine yesterday of 800 cc after straight cath. Patient denies any chest pain, shortness of breath, palpitations, dizziness at present. Denies any other complaints at present. This is 66 year old male with multiple comorbid condition underwent CABG on 10/05. Nephrology consult called for Oliguric YOANDY needing HD treatment. Patient last dialyzed on 10/07/2017 with UF of 0.5 L. Patient with significant Post Void urine yesterday of 800 cc after straight cath. Patient denies any chest pain, shortness of breath, palpitations, dizziness at present. Denies any other complaints at present. Repeat Bladder scan with Urine>900 Cc for now.

## 2017-10-12 NOTE — CONSULT NOTE ADULT - ASSESSMENT
A/P  Mr. Roldan is a 67 year old gentleman s/p CABG with hematuria and blood clots in Welsh catheter and bag.  - Irrigated Welsh catheter with sterile water and evacuated blood clots.  - Urine clear s/p irrigation and evacuation of blood clots. A/P  Mr. Roldan is a 67 year old gentleman s/p CABG with hematuria and blood clots in Welsh catheter and bag.  - Hematuria and clots are likely secondary to mild trauma during catheterization on the previous night  - Irrigated Welsh catheter with sterile water and evacuated blood clots. Urine clear s/p irrigation and evacuation of blood clots.  - Will observe patient overnight. If urine is clear of gross hematuria and blood clots, patient is clear from a irological standpoint to be discharged.  - No further acute urological interventions at this time.  - Please contact Urology team for any further questions or concerns.

## 2017-10-13 VITALS — TEMPERATURE: 99 F

## 2017-10-13 LAB
ALBUMIN SERPL ELPH-MCNC: 2.7 G/DL — LOW (ref 3.3–5)
ALP SERPL-CCNC: 90 U/L — SIGNIFICANT CHANGE UP (ref 40–120)
ALT FLD-CCNC: 16 U/L — SIGNIFICANT CHANGE UP (ref 10–45)
ANION GAP SERPL CALC-SCNC: 13 MMOL/L — SIGNIFICANT CHANGE UP (ref 5–17)
APTT BLD: 33.8 SEC — SIGNIFICANT CHANGE UP (ref 27.5–37.4)
AST SERPL-CCNC: 29 U/L — SIGNIFICANT CHANGE UP (ref 10–40)
BILIRUB SERPL-MCNC: 1.4 MG/DL — HIGH (ref 0.2–1.2)
BUN SERPL-MCNC: 37 MG/DL — HIGH (ref 7–23)
CALCIUM SERPL-MCNC: 8.3 MG/DL — LOW (ref 8.4–10.5)
CHLORIDE SERPL-SCNC: 91 MMOL/L — LOW (ref 96–108)
CO2 SERPL-SCNC: 24 MMOL/L — SIGNIFICANT CHANGE UP (ref 22–31)
CREAT SERPL-MCNC: 2.11 MG/DL — HIGH (ref 0.5–1.3)
GLUCOSE BLDC GLUCOMTR-MCNC: 163 MG/DL — HIGH (ref 70–99)
GLUCOSE BLDC GLUCOMTR-MCNC: 190 MG/DL — HIGH (ref 70–99)
GLUCOSE SERPL-MCNC: 162 MG/DL — HIGH (ref 70–99)
HCT VFR BLD CALC: 29.2 % — LOW (ref 39–50)
HGB BLD-MCNC: 9.7 G/DL — LOW (ref 13–17)
INR BLD: 1.14 — SIGNIFICANT CHANGE UP (ref 0.88–1.16)
MAGNESIUM SERPL-MCNC: 2 MG/DL — SIGNIFICANT CHANGE UP (ref 1.6–2.6)
MCHC RBC-ENTMCNC: 30.1 PG — SIGNIFICANT CHANGE UP (ref 27–34)
MCHC RBC-ENTMCNC: 33.2 G/DL — SIGNIFICANT CHANGE UP (ref 32–36)
MCV RBC AUTO: 90.7 FL — SIGNIFICANT CHANGE UP (ref 80–100)
PHOSPHATE SERPL-MCNC: 3.2 MG/DL — SIGNIFICANT CHANGE UP (ref 2.5–4.5)
PLATELET # BLD AUTO: 172 K/UL — SIGNIFICANT CHANGE UP (ref 150–400)
POTASSIUM SERPL-MCNC: 4.5 MMOL/L — SIGNIFICANT CHANGE UP (ref 3.5–5.3)
POTASSIUM SERPL-SCNC: 4.5 MMOL/L — SIGNIFICANT CHANGE UP (ref 3.5–5.3)
PROT SERPL-MCNC: 5.7 G/DL — LOW (ref 6–8.3)
PROTHROM AB SERPL-ACNC: 12.7 SEC — SIGNIFICANT CHANGE UP (ref 9.8–12.7)
RBC # BLD: 3.22 M/UL — LOW (ref 4.2–5.8)
RBC # FLD: 13.4 % — SIGNIFICANT CHANGE UP (ref 10.3–16.9)
SODIUM SERPL-SCNC: 128 MMOL/L — LOW (ref 135–145)
WBC # BLD: 6 K/UL — SIGNIFICANT CHANGE UP (ref 3.8–10.5)
WBC # FLD AUTO: 6 K/UL — SIGNIFICANT CHANGE UP (ref 3.8–10.5)

## 2017-10-13 PROCEDURE — 99221 1ST HOSP IP/OBS SF/LOW 40: CPT

## 2017-10-13 PROCEDURE — 99232 SBSQ HOSP IP/OBS MODERATE 35: CPT | Mod: GC

## 2017-10-13 RX ORDER — TAMSULOSIN HYDROCHLORIDE 0.4 MG/1
1 CAPSULE ORAL
Qty: 0 | Refills: 0 | COMMUNITY
Start: 2017-10-13

## 2017-10-13 RX ORDER — ASPIRIN/CALCIUM CARB/MAGNESIUM 324 MG
1 TABLET ORAL
Qty: 0 | Refills: 0 | COMMUNITY
Start: 2017-10-13

## 2017-10-13 RX ORDER — ACETAMINOPHEN 500 MG
1000 TABLET ORAL ONCE
Qty: 0 | Refills: 0 | Status: COMPLETED | OUTPATIENT
Start: 2017-10-13 | End: 2017-10-13

## 2017-10-13 RX ORDER — SODIUM CHLORIDE 9 MG/ML
3 INJECTION INTRAMUSCULAR; INTRAVENOUS; SUBCUTANEOUS EVERY 8 HOURS
Qty: 0 | Refills: 0 | Status: DISCONTINUED | OUTPATIENT
Start: 2017-10-13 | End: 2017-10-13

## 2017-10-13 RX ORDER — ATORVASTATIN CALCIUM 80 MG/1
1 TABLET, FILM COATED ORAL
Qty: 0 | Refills: 0 | COMMUNITY
Start: 2017-10-13

## 2017-10-13 RX ORDER — PANTOPRAZOLE SODIUM 20 MG/1
1 TABLET, DELAYED RELEASE ORAL
Qty: 0 | Refills: 0 | COMMUNITY
Start: 2017-10-13

## 2017-10-13 RX ORDER — CLOPIDOGREL BISULFATE 75 MG/1
1 TABLET, FILM COATED ORAL
Qty: 0 | Refills: 0 | COMMUNITY
Start: 2017-10-13

## 2017-10-13 RX ORDER — SERTRALINE 25 MG/1
1 TABLET, FILM COATED ORAL
Qty: 0 | Refills: 0 | COMMUNITY
Start: 2017-10-13

## 2017-10-13 RX ADMIN — CLOPIDOGREL BISULFATE 75 MILLIGRAM(S): 75 TABLET, FILM COATED ORAL at 09:28

## 2017-10-13 RX ADMIN — MIDODRINE HYDROCHLORIDE 5 MILLIGRAM(S): 2.5 TABLET ORAL at 05:53

## 2017-10-13 RX ADMIN — Medication 4 MILLIGRAM(S): at 09:00

## 2017-10-13 RX ADMIN — Medication 4 MILLIGRAM(S): at 09:29

## 2017-10-13 RX ADMIN — PANTOPRAZOLE SODIUM 40 MILLIGRAM(S): 20 TABLET, DELAYED RELEASE ORAL at 06:01

## 2017-10-13 RX ADMIN — Medication 2: at 12:33

## 2017-10-13 RX ADMIN — Medication 81 MILLIGRAM(S): at 09:28

## 2017-10-13 RX ADMIN — HEPARIN SODIUM 5000 UNIT(S): 5000 INJECTION INTRAVENOUS; SUBCUTANEOUS at 05:52

## 2017-10-13 RX ADMIN — SERTRALINE 25 MILLIGRAM(S): 25 TABLET, FILM COATED ORAL at 09:28

## 2017-10-13 RX ADMIN — Medication 400 MILLIGRAM(S): at 08:37

## 2017-10-13 RX ADMIN — Medication 2: at 06:01

## 2017-10-13 NOTE — PROGRESS NOTE ADULT - ASSESSMENT
This is 66 year old male with multiple comorbid condition underwent CABG on 10/05. Nephrology consult called for Oliguric YOANDY needing intermittent HD treatment. Patient last dialyzed on 10/10/2017 with UF of 1 L net and tolerated procedure well. Patient with recurrent significant post void with urinary retention now with miranda's catheter. Patient with 1.7 Liter urine in past 24 hours duration after miranda's catheter placed. BUn/Creatinine still remain elevated at present at 37/2.1 at present.

## 2017-10-13 NOTE — PROGRESS NOTE ADULT - PROBLEM SELECTOR PROBLEM 1
Acute renal failure

## 2017-10-13 NOTE — PROGRESS NOTE ADULT - ATTENDING COMMENTS
seen and evaluated while on dialysis   tolerating the procedure well  continue full treatment as prescribed- 2KG UF 3 Hr Rx
seen and evaluated while on dialysis   tolerating the procedure well. BP low but stable  Continue full treatment as prescribed
seen and evaluated while on dialysis  tolerating the procedure well  continue full treatment as prescribed
seen and evaluated while on dialysis.  tolerating procedure well  Continue full treatment as prescribed
tolerating dialysis
breathing better with chest tube  for dialysis today
excellent urine output and  creatinine remains in acceptable range.  Defer HD today  leaving for [place]- called and just spoke with nephrologist there about patient's renal recovery thus far and possibility tht he may be able to come off HD soon.
making some urine.  tolerated dialysis well yesterday.  Volume status electrolytes stable and acceptable.  for tunneled HD catheter tomorrow by IR.  Next HD tomorrow or 10/10- to re-assess in AM  some urine retention in bladder sono- to continue to monitor- no need for straight cath yet
remains oliguric  For repeat dialysis today
tolerated dialysis well yesterday.  but poor urine output--- repeated bladder scan which revealed significant urine retention--  straight cath done.  Need bladder scan BID- and either c/w straight cath or replace miranda  BP, electrolytes and B/C in stable and acceptable ranges - defer HD today- to continue to monitor closely for need for repeat dialysis
urine retention again of 900 ml-  non-oliguric x 2 days- but retaining-  suggest- Welsh catheter  defer repeat dialysis  If patient to be transferred to SNF within next 2 days should be dc'd with HD catheter in place in case additional HD needed. If remains in patient until next week- will re-assess- may be able to remove HD soon if urine output  continues to improve
Events noted and discussed.  Remains oliguric.  For repeat dialysis today to improve volume status
awake  alert, extubated overnight  OOB in chair.  urine output still low  for repeat dialysis today
oliguria continues  low BP.  Respiratory status acceptable.  Defer dialysis today
post- op CTS  remains intubated, FiO2 @ 50%.  scant urine output.  Has needed volume expansion with IVF and PRBCs  defer dialysis tonight   reassess in AM- anticipate need for repeat hemodialysis tomorrow
tolerated dialysis well yesterday.  OOB in chair  Remains oliguric.  Anticipate that he will need additional dialysis tomorrow and again next week-   difficult to predict timing of recovery- and dialysis independence- therefore okay to proceed with placement of tunneled HD catheter today.  No plans for dialysis today
oliguric ATN- hourly urine output slight improvement.  tolerated dialysis well yesterday  Remains fluid overloaded  To proceed with anothr acute dialysis treatment today for UF.  Reviewed case with patient's wife as well

## 2017-10-13 NOTE — PROGRESS NOTE ADULT - NSHPATTENDINGPLANDISCUSS_GEN_ALL_CORE
CT surgery
9East team
patient's wife
9 [facility] team
CTS team
9 [facility] team
CT surgery and also patient's wife

## 2017-10-13 NOTE — PROGRESS NOTE ADULT - PROBLEM SELECTOR PLAN 2
H/o hypertension now with low BP likely due to recent cardiac surgery and heart failure .  No antihypertensive medication for now.  Avoid hypotension to prevent further renal insult.
H/o hypertension now with low BP likely due to recent cardiac surgery and heart failure and volume loss.  Will lower UF goal to 0.5 to 1L for today.
H/o hypertension now with low BP likely due to recent cardiac surgery and heart failure and volume loss.  No antihypertensive medication  Avoid hypotension to prevent further renal insult.
- resolved 134   - please restrict him free water   - we will do HD today again
- resolved 135
- resolved 138
H/o hypertension now with low BP likely due to recent cardiac surgery and heart failure   No antihypertensive medication.  Avoid hypotension to prevent further renal insult.
H/o hypertension now with low BP likely due to recent cardiac surgery and heart failure and volume loss.  No antihypertensive medication.  Avoid hypotension to prevent further renal insult.
- resolved 137
- 128   - most likely from free water retention   - please restrict him   - we will do HD today again

## 2017-10-13 NOTE — PROGRESS NOTE ADULT - PROBLEM SELECTOR PROBLEM 3
Anemia
HTN (hypertension)

## 2017-10-13 NOTE — PROGRESS NOTE ADULT - PROVIDER SPECIALTY LIST ADULT
CT Surgery
CT Surgery
Critical Care
Nephrology
Critical Care
Nephrology
CT Surgery
Critical Care
Nephrology

## 2017-10-13 NOTE — PROGRESS NOTE ADULT - SUBJECTIVE AND OBJECTIVE BOX
Patient discussed on morning rounds with Dr. Sorenson     Operation / Date:   10/4 OPCAB X3 EF 30% Mod MR     Surgeon: Dr. Sorenson    Hospital Course:  65 y/o M w/ PMH of DM, HLD and prostatic cancer s/p radiation therapy in 2010 was brought in by ambulance after a syncopal episode. Pt was having multiple days of diarrhea after taking laxative for constipation. In the ED at [place] patient's labs were significant for elevated troponins. Pt was also found to have both YOANDY and hyponatremia which was presumed to be due to gastric losses and both recovered with fluid resuscitation. On 9/29/17 patient underwent cardiac catherization for NSTEMI and was found to have EF of 25% and 3vCAD ( MidLAD 80%, distal LAD 80%, Diagonal 85%, Prox circ 95%, . left PDA 65%, Prox RCA 70, Mid RCA 85% and Distal PCA 90%). Echo demonstrated moderate Tricuspid regurgitation. In light of 3vCAD patient was transferred to [facility] for CABG work up and admitted at [facility] on 9/30/17. Upon admission, pt had low urine output with increased BUN/Cr and nephrology team was consult. 10/1/17, pt was transferred to CTICU for HD and right pigtail placement for pleural effusion. 10/4/17, pt underwent OPCABx3 post-op pt was given PRBC. POD#1, pt was extubated and underwent HD with PRBC. POD#2, permacath was placed by IR and pressors were d/c'ed. POD#5, left pleural pigtail placed -1L serosanguinous fluid. POD#6, left pleural pigtail removed. POD#7, urinary retention noted and pt was straight cathed. POD#8, continued urinary retention and miranda was placed. As per Dr. Sorenson pt stable and ready for discharge to nursing home. Today midodrine was discontinued. At this time holding off on beta blocker. Patient was on coreg as outpatient. Consider restarting at a later date once blood pressure can tolerate.     Pt was discharged with miranda in place and will have outpatient follow-up with Dr. Rushing. Pt will also undergo HD once he arrives at the nursing home today and out patient dialysis and renal follow up will be set up from there.     Vital Signs Last 24 Hrs  T(C): 36.5 (13 Oct 2017 05:00), Max: 36.8 (13 Oct 2017 01:00)  T(F): 97.7 (13 Oct 2017 05:00), Max: 98.2 (13 Oct 2017 01:00)  HR: 66 (13 Oct 2017 08:48) (62 - 80)  BP: 106/63 (13 Oct 2017 08:48) (87/60 - 129/64)  BP(mean): 75 (13 Oct 2017 08:48) (68 - 93)  RR: 16 (13 Oct 2017 08:48) (15 - 30)  SpO2: 97% (13 Oct 2017 08:48) (93% - 99%)  I&O's Detail    12 Oct 2017 07:01  -  13 Oct 2017 07:00  --------------------------------------------------------  IN:    IV PiggyBack: 50 mL    Oral Fluid: 50 mL    Packed Red Blood Cells: 250 mL    sodium chloride 0.45%.: 50 mL  Total IN: 400 mL    OUT:    Indwelling Catheter - Urethral: 1770 mL    Stool: 2 mL  Total OUT: 1772 mL    Total NET: -1372 mL      13 Oct 2017 07:01  -  13 Oct 2017 12:56  --------------------------------------------------------  IN:    IV PiggyBack: 100 mL  Total IN: 100 mL    OUT:    Indwelling Catheter - Urethral: 50 mL    Stool: 1 mL  Total OUT: 51 mL    Total NET: 49 mL          EPICARDIAL WIRES REMOVED: Yes  TIE DOWNS REMOVED: Yes    PHYSICAL EXAM:    General: Aox3 in no     Neurological:    Cardiovascular:    Respiratory:    Gastrointestinal:    Extremities:    Vascular:    Incision Sites:    LABS:                        9.7    6.0   )-----------( 172      ( 13 Oct 2017 03:45 )             29.2       COUMADIN:  Yes/No.        DOSE:                  INDICATION:                GOAL INR:    PT/INR - ( 13 Oct 2017 03:29 )   PT: 12.7 sec;   INR: 1.14          PTT - ( 13 Oct 2017 03:29 )  PTT:33.8 sec    10-13    128<L>  |  91<L>  |  37<H>  ----------------------------<  162<H>  4.5   |  24  |  2.11<H>    Ca    8.3<L>      13 Oct 2017 03:29  Phos  3.2     10-13  Mg     2.0     10-13    TPro  5.7<L>  /  Alb  2.7<L>  /  TBili  1.4<H>  /  DBili  x   /  AST  29  /  ALT  16  /  AlkPhos  90  10-13          MEDICATIONS  (STANDING):  aspirin enteric coated 81 milliGRAM(s) Oral daily  atorvastatin 40 milliGRAM(s) Oral at bedtime  clopidogrel Tablet 75 milliGRAM(s) Oral daily  dextrose 5%. 1000 milliLiter(s) (50 mL/Hr) IV Continuous <Continuous>  dextrose 50% Injectable 12.5 Gram(s) IV Push once  dextrose 50% Injectable 25 Gram(s) IV Push once  dextrose 50% Injectable 25 Gram(s) IV Push once  heparin  Injectable 5000 Unit(s) SubCutaneous every 12 hours  insulin lispro (HumaLOG) corrective regimen sliding scale   SubCutaneous Before meals and at bedtime  lidocaine   Patch 1 Patch Transdermal daily  pantoprazole    Tablet 40 milliGRAM(s) Oral before breakfast  sertraline 25 milliGRAM(s) Oral daily  sodium chloride 0.45%. 1000 milliLiter(s) (10 mL/Hr) IV Continuous <Continuous>  sodium chloride 0.9% lock flush 3 milliLiter(s) IV Push every 8 hours  tamsulosin 0.4 milliGRAM(s) Oral at bedtime  testosterone patch 4 mG/24 Hr(s) 4 milliGRAM(s) Transdermal daily      Discharge CXR:    Discharge ECHO: Patient discussed on morning rounds with Dr. Sorenson     Operation / Date:   10/4 OPCAB X3 EF 30% Mod MR     Surgeon: Dr. Sorenson    Hospital Course:  67 y/o M w/ PMH of DM, HLD and prostatic cancer s/p radiation therapy in 2010 was brought in by ambulance after a syncopal episode. Pt was having multiple days of diarrhea after taking laxative for constipation. In the ED at [place] patient's labs were significant for elevated troponins. Pt was also found to have both YOANDY and hyponatremia which was presumed to be due to gastric losses and both recovered with fluid resuscitation. On 9/29/17 patient underwent cardiac catherization for NSTEMI and was found to have EF of 25% and 3vCAD ( MidLAD 80%, distal LAD 80%, Diagonal 85%, Prox circ 95%, . left PDA 65%, Prox RCA 70, Mid RCA 85% and Distal PCA 90%). Echo demonstrated moderate Tricuspid regurgitation. In light of 3vCAD patient was transferred to [facility] for CABG work up and admitted at [facility] on 9/30/17. Upon admission, pt had low urine output with increased BUN/Cr and nephrology team was consult. 10/1/17, pt was transferred to CTICU for HD and right pigtail placement for pleural effusion. 10/4/17, pt underwent OPCABx3 post-op pt was given PRBC. POD#1, pt was extubated and underwent HD with PRBC. POD#2, permacath was placed by IR and pressors were d/c'ed. POD#5, left pleural pigtail placed -1L serosanguinous fluid. POD#6, left pleural pigtail removed. POD#7, urinary retention noted and pt was straight cathed. POD#8, continued urinary retention and miranda was placed. As per Dr. Sorenson pt stable and ready for discharge to nursing home. Today midodrine was discontinued. At this time holding off on beta blocker. Patient was on coreg as outpatient. Consider restarting at a later date once blood pressure can tolerate.     Pt was discharged with miranda in place and will have outpatient follow-up with Dr. Rushing. Pt will also undergo HD once he arrives at the nursing home today and out patient dialysis and renal follow up will be set up from there.     Vital Signs Last 24 Hrs  T(C): 36.5 (13 Oct 2017 05:00), Max: 36.8 (13 Oct 2017 01:00)  T(F): 97.7 (13 Oct 2017 05:00), Max: 98.2 (13 Oct 2017 01:00)  HR: 66 (13 Oct 2017 08:48) (62 - 80)  BP: 106/63 (13 Oct 2017 08:48) (87/60 - 129/64)  BP(mean): 75 (13 Oct 2017 08:48) (68 - 93)  RR: 16 (13 Oct 2017 08:48) (15 - 30)  SpO2: 97% (13 Oct 2017 08:48) (93% - 99%)  I&O's Detail    12 Oct 2017 07:01  -  13 Oct 2017 07:00  --------------------------------------------------------  IN:    IV PiggyBack: 50 mL    Oral Fluid: 50 mL    Packed Red Blood Cells: 250 mL    sodium chloride 0.45%.: 50 mL  Total IN: 400 mL    OUT:    Indwelling Catheter - Urethral: 1770 mL    Stool: 2 mL  Total OUT: 1772 mL    Total NET: -1372 mL      13 Oct 2017 07:01  -  13 Oct 2017 12:56  --------------------------------------------------------  IN:    IV PiggyBack: 100 mL  Total IN: 100 mL    OUT:    Indwelling Catheter - Urethral: 50 mL    Stool: 1 mL  Total OUT: 51 mL    Total NET: 49 mL          EPICARDIAL WIRES REMOVED: Yes  TIE DOWNS REMOVED: Yes    PHYSICAL EXAM:    General: Aox3 in no acute distress.     Neurological: no focal neuro deficit.     Cardiovascular: RRR no murmurs rubs or gallops.     Respiratory: bibasilar crackles bilaterally. Right sided permacath placed    Gastrointestinal: soft non tender + distended Normal bowel sounds    : + Miranda in place.     Extremities: WWP no lower extremity edema bilaterally     Vascular: 2+ distal pulses bilaterally     Incision Sites: MSI Clean dry intact. no sternal click. erythema or increased warmth. no drainage.     LABS:                        9.7    6.0   )-----------( 172      ( 13 Oct 2017 03:45 )             29.2       COUMADIN:  No.       PT/INR - ( 13 Oct 2017 03:29 )   PT: 12.7 sec;   INR: 1.14          PTT - ( 13 Oct 2017 03:29 )  PTT:33.8 sec    10-13    128<L>  |  91<L>  |  37<H>  ----------------------------<  162<H>  4.5   |  24  |  2.11<H>    Ca    8.3<L>      13 Oct 2017 03:29  Phos  3.2     10-13  Mg     2.0     10-13    TPro  5.7<L>  /  Alb  2.7<L>  /  TBili  1.4<H>  /  DBili  x   /  AST  29  /  ALT  16  /  AlkPhos  90  10-13          MEDICATIONS  (STANDING):  aspirin enteric coated 81 milliGRAM(s) Oral daily  atorvastatin 40 milliGRAM(s) Oral at bedtime  clopidogrel Tablet 75 milliGRAM(s) Oral daily  dextrose 5%. 1000 milliLiter(s) (50 mL/Hr) IV Continuous <Continuous>  dextrose 50% Injectable 12.5 Gram(s) IV Push once  dextrose 50% Injectable 25 Gram(s) IV Push once  dextrose 50% Injectable 25 Gram(s) IV Push once  heparin  Injectable 5000 Unit(s) SubCutaneous every 12 hours  insulin lispro (HumaLOG) corrective regimen sliding scale   SubCutaneous Before meals and at bedtime  lidocaine   Patch 1 Patch Transdermal daily  pantoprazole    Tablet 40 milliGRAM(s) Oral before breakfast  sertraline 25 milliGRAM(s) Oral daily  sodium chloride 0.45%. 1000 milliLiter(s) (10 mL/Hr) IV Continuous <Continuous>  sodium chloride 0.9% lock flush 3 milliLiter(s) IV Push every 8 hours  tamsulosin 0.4 milliGRAM(s) Oral at bedtime  testosterone patch 4 mG/24 Hr(s) 4 milliGRAM(s) Transdermal daily      Discharge CXR: < from: Xray Chest 1 View AP -PORTABLE-Routine (10.11.17 @ 04:25) >    INTERPRETATION:  Portable chest    History: follow up abnormal exam    Possible tiny left apex pneumothorax.    Lower lung infiltrates/atelectasis improving compared to prior exam   10/10/17.    No other change.    < end of copied text >      Discharge ECHO: < from: Echocardiogram (10.06.17 @ 12:14) >    INTERPRETATION:  Patient Height: 183.0 cm  Patient Weight: 82.0 kg  Systolic Pressure: 135 mmHg  Diastolic Pressure: 54 mmHg  BSA: 2.0 m^2  Interpretation Summary  Normal left ventricular size and wall thickness.The entire septum is   akinetic.   The rest of the myocardial segments are mildly hypokinetic.  The left   ventricular ejection fraction is severely reduced.The left ventricular  ejection fraction is 30%.The right ventricle is normal in size and   function.The left atrial size is normal.Right atrial size is   normal.Structurally normal aortic valve.No aortic regurgitation   noted.Structurally normal mitral valve.There is mildmitral   regurgitation.Structurally normal tricuspid valve.There is trace   tricuspid   regurgitation.The pulmonary artery systolic pressure is estimated to be   29   mmHg.Structurally normal pulmonic valve.No pulmonic regurgitation   noted.There   is no pericardial effusion.Left pleural effusion noted.There is a 7.9 cm   x 5.1   cm likely cyst seen in the liver. Consider a dedicated RUQ U/S if   clincially   indicated.    < end of copied text >

## 2017-10-13 NOTE — PROGRESS NOTE ADULT - PROBLEM SELECTOR PLAN 3
- now on the lower side   - no need for BP medications
Anemia of chronic disease likely due to blood loss.  Transfuse to keep Hct>25-30%  Check iron studies.
- now on the lower side   - no need for BP medications
- now on the lower side   - no need for BP medications
- now on the lower side   - no need for BP medications  - still on small dose of levophed
Anemia of chronic disease likely due to blood loss.  Transfuse to keep Hct>25-30%  Check iron studies.
- now on the lower side   - no need for BP medications

## 2017-10-13 NOTE — PROGRESS NOTE ADULT - PROBLEM SELECTOR PROBLEM 2
HTN (hypertension)
Hyponatremia
HTN (hypertension)
HTN (hypertension)
Hyponatremia

## 2017-10-13 NOTE — PROGRESS NOTE ADULT - SUBJECTIVE AND OBJECTIVE BOX
Patient is a 67y Male seen and evaluated at bedside. Patient alert and awake at present. Denies any chest pain, shortness of breath, palpitations, dizziness at present. Denies any other complaints at present sitting in chair having his lunch at present.      aspirin enteric coated 81 daily  atorvastatin 40 at bedtime  clopidogrel Tablet 75 daily  dextrose 5%. 1000 <Continuous>  dextrose 50% Injectable 12.5 once  dextrose 50% Injectable 25 once  dextrose 50% Injectable 25 once  dextrose Gel 1 once PRN  glucagon  Injectable 1 once PRN  heparin  Injectable 5000 every 12 hours  insulin lispro (HumaLOG) corrective regimen sliding scale  Before meals and at bedtime  lidocaine   Patch 1 daily  pantoprazole    Tablet 40 before breakfast  sertraline 25 daily  sodium chloride 0.45%. 1000 <Continuous>  sodium chloride 0.9% lock flush 3 every 8 hours  tamsulosin 0.4 at bedtime  testosterone patch 4 mG/24 Hr(s) 4 daily      Allergies    No Known Allergies    Intolerances        T(C): , Max: 36.8 (10-13-17 @ 01:00)  T(F): , Max: 98.2 (10-13-17 @ 01:00)  HR: 66 (10-13-17 @ 08:48)  BP: 106/63 (10-13-17 @ 08:48)  BP(mean): 75 (10-13-17 @ 08:48)  RR: 16 (10-13-17 @ 08:48)  SpO2: 97% (10-13-17 @ 08:48)  Wt(kg): --    10-12 @ 07:01  -  10-13 @ 07:00  --------------------------------------------------------  IN: 400 mL / OUT: 1772 mL / NET: -1372 mL    10-13 @ 07:01  -  10-13 @ 12:54  --------------------------------------------------------  IN: 100 mL / OUT: 51 mL / NET: 49 mL          Review of Systems:  CONSTITUTIONAL: No fever or chills, No fatigue or tiredness.  EYES: No blurred or double vision.  RESPIRATORY: No shortness of breath, cough, hemoptysis  CARDIOVASCULAR: No Chest pain or shortness of breath  GASTROINTESTINAL: NO abdominal or flank pain, No nausea or vomiting, No diarrhea  GENITOURINARY: No dysuria or urinary burning, No difficulty passing urine, No hematuria  NEUROLOGICAL: No headaches or blurred vision  SKIN: No skin rashes   MUSCULOSKELETAL: No arthralgia, Joint pain, leg edema, No muscle pains      PHYSICAL EXAM:  GENERAL: NAD, well-developed, well nourished, alert, awake, no acute distress at present  HEAD:  Atraumatic, Normocephalic,   EYES: Bilateral conjuctiva and sclera normal   Oral cavity: Oral mucosa dry and pink  NECK: Neck supple, No JVD, Right sided permacath present.  CHEST/LUNG: Clear to auscultation bilaterally; No wheeze, no rales, no crepitations  HEART: Regular rate and rhythm. AGUEDA II/VI at LPSB, No gallop, no rub   ABDOMEN: Soft, Nontender, BS+nt, No flank tenderness.   EXTREMITIES: No clubbing, cyanosis, or edema  Neurology: AAOx3, no focal neurological deficit  SKIN: No rashes or lesions          ACCESS: Right sided permacath present. NO bleeding or inflammation.    LABS:                        9.7    6.0   )-----------( 172      ( 13 Oct 2017 03:45 )             29.2     10-13    128<L>  |  91<L>  |  37<H>  ----------------------------<  162<H>  4.5   |  24  |  2.11<H>    Ca    8.3<L>      13 Oct 2017 03:29  Phos  3.2     10-13  Mg     2.0     10-13    TPro  5.7<L>  /  Alb  2.7<L>  /  TBili  1.4<H>  /  DBili  x   /  AST  29  /  ALT  16  /  AlkPhos  90  10-13      PT/INR - ( 13 Oct 2017 03:29 )   PT: 12.7 sec;   INR: 1.14          PTT - ( 13 Oct 2017 03:29 )  PTT:33.8 sec          RADIOLOGY & ADDITIONAL STUDIES:  < from: CT Head No Cont (10.12.17 @ 11:02) >    EXAM:  CT BRAIN                          PROCEDURE DATE:  10/12/2017                     INTERPRETATION:  PROCEDURE: CT head without intravenous contrast.    INDICATION: Postoperative study    TECHNIQUE: Multiple axial sections were obtained at 5 mm intervals. The   images were reviewed in brain and bone windows.     COMPARISON: None    FINDINGS:     There is mild diffuse parenchymal volume loss with proportionate   enlargement of the ventricular system and sulcation. There is no acute   intracranial hemorrhage, mass effect or midline shift. There is no   extra-axial fluid collection.    The gray white differentiation appears preserved without evidence of an   acute transcortical infarction.    There are patchy areas of hypodensity within the periventricular white   matter which may represent the sequela of small vessel ischemic disease.     The bony windows demonstrates no fractures. The visualized paranasal   sinuses and mastoid air cells are predominantly clear.    IMPRESSION:     Noacute intracranial abnormality. Specifically, no acute intracranial   hemorrhage, mass effect or recent transcortical or territorial   infarction.     Parenchymal volume loss and mild small vessel ischemic change.              "Thank you for the opportunity to participate in the care of this   patient."        ANJUM CORTEZ M.D. ATTENDING RADIOLOGIST  This document has been electronically signed. Oct 12 2017 11:41AM                  < end of copied text >

## 2017-10-16 DIAGNOSIS — I13.0 HYPERTENSIVE HEART AND CHRONIC KIDNEY DISEASE WITH HEART FAILURE AND STAGE 1 THROUGH STAGE 4 CHRONIC KIDNEY DISEASE, OR UNSPECIFIED CHRONIC KIDNEY DISEASE: ICD-10-CM

## 2017-10-16 DIAGNOSIS — E11.22 TYPE 2 DIABETES MELLITUS WITH DIABETIC CHRONIC KIDNEY DISEASE: ICD-10-CM

## 2017-10-16 DIAGNOSIS — I10 ESSENTIAL (PRIMARY) HYPERTENSION: ICD-10-CM

## 2017-10-16 DIAGNOSIS — I50.23 ACUTE ON CHRONIC SYSTOLIC (CONGESTIVE) HEART FAILURE: ICD-10-CM

## 2017-10-16 DIAGNOSIS — R33.9 RETENTION OF URINE, UNSPECIFIED: ICD-10-CM

## 2017-10-16 DIAGNOSIS — J90 PLEURAL EFFUSION, NOT ELSEWHERE CLASSIFIED: ICD-10-CM

## 2017-10-16 DIAGNOSIS — I36.1 NONRHEUMATIC TRICUSPID (VALVE) INSUFFICIENCY: ICD-10-CM

## 2017-10-16 DIAGNOSIS — I21.4 NON-ST ELEVATION (NSTEMI) MYOCARDIAL INFARCTION: ICD-10-CM

## 2017-10-16 DIAGNOSIS — E87.1 HYPO-OSMOLALITY AND HYPONATREMIA: ICD-10-CM

## 2017-10-16 DIAGNOSIS — N17.0 ACUTE KIDNEY FAILURE WITH TUBULAR NECROSIS: ICD-10-CM

## 2017-10-16 DIAGNOSIS — I50.9 HEART FAILURE, UNSPECIFIED: ICD-10-CM

## 2017-10-16 DIAGNOSIS — D63.8 ANEMIA IN OTHER CHRONIC DISEASES CLASSIFIED ELSEWHERE: ICD-10-CM

## 2017-10-16 DIAGNOSIS — N18.9 CHRONIC KIDNEY DISEASE, UNSPECIFIED: ICD-10-CM

## 2017-10-16 DIAGNOSIS — I27.20 PULMONARY HYPERTENSION, UNSPECIFIED: ICD-10-CM

## 2017-10-16 DIAGNOSIS — I34.0 NONRHEUMATIC MITRAL (VALVE) INSUFFICIENCY: ICD-10-CM

## 2017-10-16 DIAGNOSIS — I25.10 ATHEROSCLEROTIC HEART DISEASE OF NATIVE CORONARY ARTERY WITHOUT ANGINA PECTORIS: ICD-10-CM

## 2017-10-16 DIAGNOSIS — F32.9 MAJOR DEPRESSIVE DISORDER, SINGLE EPISODE, UNSPECIFIED: ICD-10-CM

## 2017-10-16 DIAGNOSIS — D64.9 ANEMIA, UNSPECIFIED: ICD-10-CM

## 2017-10-18 PROBLEM — Z00.00 ENCOUNTER FOR PREVENTIVE HEALTH EXAMINATION: Noted: 2017-10-18

## 2017-10-19 PROBLEM — I25.10 CAD (CORONARY ARTERY DISEASE): Status: ACTIVE | Noted: 2017-10-19

## 2017-10-19 PROBLEM — Z85.46 HISTORY OF MALIGNANT NEOPLASM OF PROSTATE: Status: RESOLVED | Noted: 2017-10-19 | Resolved: 2017-10-19

## 2017-10-19 RX ORDER — GLIPIZIDE 5 MG/1
5 TABLET ORAL DAILY
Refills: 0 | Status: ACTIVE | COMMUNITY

## 2017-10-19 RX ORDER — ATORVASTATIN CALCIUM 40 MG/1
40 TABLET, FILM COATED ORAL
Qty: 30 | Refills: 1 | Status: ACTIVE | COMMUNITY

## 2017-10-20 VITALS — BODY MASS INDEX: 24.49 KG/M2 | HEIGHT: 71.97 IN | WEIGHT: 180.78 LBS

## 2017-10-24 ENCOUNTER — APPOINTMENT (OUTPATIENT)
Dept: CARDIOTHORACIC SURGERY | Facility: CLINIC | Age: 67
End: 2017-10-24
Payer: COMMERCIAL

## 2017-10-24 DIAGNOSIS — I25.10 ATHEROSCLEROTIC HEART DISEASE OF NATIVE CORONARY ARTERY W/OUT ANGINA PECTORIS: ICD-10-CM

## 2017-10-24 DIAGNOSIS — Z92.3 PERSONAL HISTORY OF IRRADIATION: ICD-10-CM

## 2017-10-24 DIAGNOSIS — Z85.46 PERSONAL HISTORY OF MALIGNANT NEOPLASM OF PROSTATE: ICD-10-CM

## 2017-10-30 ENCOUNTER — FORM ENCOUNTER (OUTPATIENT)
Age: 67
End: 2017-10-30

## 2017-10-31 ENCOUNTER — APPOINTMENT (OUTPATIENT)
Dept: CARDIOTHORACIC SURGERY | Facility: CLINIC | Age: 67
End: 2017-10-31
Payer: COMMERCIAL

## 2017-10-31 ENCOUNTER — OUTPATIENT (OUTPATIENT)
Dept: OUTPATIENT SERVICES | Facility: HOSPITAL | Age: 67
LOS: 1 days | End: 2017-10-31
Payer: COMMERCIAL

## 2017-10-31 VITALS
BODY MASS INDEX: 25.11 KG/M2 | DIASTOLIC BLOOD PRESSURE: 69 MMHG | TEMPERATURE: 98.6 F | SYSTOLIC BLOOD PRESSURE: 126 MMHG | RESPIRATION RATE: 18 BRPM | OXYGEN SATURATION: 98 % | HEART RATE: 86 BPM | WEIGHT: 185 LBS

## 2017-10-31 PROCEDURE — 71020: CPT | Mod: 26

## 2017-10-31 PROCEDURE — 99024 POSTOP FOLLOW-UP VISIT: CPT

## 2017-10-31 PROCEDURE — 71046 X-RAY EXAM CHEST 2 VIEWS: CPT

## 2017-11-08 ENCOUNTER — APPOINTMENT (OUTPATIENT)
Dept: UROLOGY | Facility: CLINIC | Age: 67
End: 2017-11-08

## 2017-11-20 ENCOUNTER — APPOINTMENT (OUTPATIENT)
Dept: CARDIOTHORACIC SURGERY | Facility: CLINIC | Age: 67
End: 2017-11-20
Payer: COMMERCIAL

## 2017-11-20 ENCOUNTER — NON-APPOINTMENT (OUTPATIENT)
Age: 67
End: 2017-11-20

## 2017-11-20 VITALS
OXYGEN SATURATION: 97 % | DIASTOLIC BLOOD PRESSURE: 68 MMHG | HEART RATE: 87 BPM | SYSTOLIC BLOOD PRESSURE: 112 MMHG | BODY MASS INDEX: 23.48 KG/M2 | RESPIRATION RATE: 19 BRPM | TEMPERATURE: 97.7 F | WEIGHT: 173 LBS

## 2017-11-20 DIAGNOSIS — Z95.1 PRESENCE OF AORTOCORONARY BYPASS GRAFT: ICD-10-CM

## 2017-11-20 DIAGNOSIS — Z09 ENCOUNTER FOR FOLLOW-UP EXAMINATION AFTER COMPLETED TREATMENT FOR CONDITIONS OTHER THAN MALIGNANT NEOPLASM: ICD-10-CM

## 2017-11-20 PROCEDURE — 99024 POSTOP FOLLOW-UP VISIT: CPT

## 2017-12-19 PROCEDURE — 93880 EXTRACRANIAL BILAT STUDY: CPT

## 2017-12-19 PROCEDURE — 93306 TTE W/DOPPLER COMPLETE: CPT

## 2017-12-19 PROCEDURE — 97116 GAIT TRAINING THERAPY: CPT

## 2017-12-19 PROCEDURE — 70450 CT HEAD/BRAIN W/O DYE: CPT

## 2017-12-19 PROCEDURE — 81001 URINALYSIS AUTO W/SCOPE: CPT

## 2017-12-19 PROCEDURE — 97162 PT EVAL MOD COMPLEX 30 MIN: CPT

## 2017-12-19 PROCEDURE — 82550 ASSAY OF CK (CPK): CPT

## 2017-12-19 PROCEDURE — 93925 LOWER EXTREMITY STUDY: CPT

## 2017-12-19 PROCEDURE — 84480 ASSAY TRIIODOTHYRONINE (T3): CPT

## 2017-12-19 PROCEDURE — 84295 ASSAY OF SERUM SODIUM: CPT

## 2017-12-19 PROCEDURE — 71045 X-RAY EXAM CHEST 1 VIEW: CPT

## 2017-12-19 PROCEDURE — 87046 STOOL CULTR AEROBIC BACT EA: CPT

## 2017-12-19 PROCEDURE — 84439 ASSAY OF FREE THYROXINE: CPT

## 2017-12-19 PROCEDURE — 86704 HEP B CORE ANTIBODY TOTAL: CPT

## 2017-12-19 PROCEDURE — 83605 ASSAY OF LACTIC ACID: CPT

## 2017-12-19 PROCEDURE — 84484 ASSAY OF TROPONIN QUANT: CPT

## 2017-12-19 PROCEDURE — 83880 ASSAY OF NATRIURETIC PEPTIDE: CPT

## 2017-12-19 PROCEDURE — 87340 HEPATITIS B SURFACE AG IA: CPT

## 2017-12-19 PROCEDURE — C1894: CPT

## 2017-12-19 PROCEDURE — 84156 ASSAY OF PROTEIN URINE: CPT

## 2017-12-19 PROCEDURE — 82330 ASSAY OF CALCIUM: CPT

## 2017-12-19 PROCEDURE — C1889: CPT

## 2017-12-19 PROCEDURE — 86900 BLOOD TYPING SEROLOGIC ABO: CPT

## 2017-12-19 PROCEDURE — 82436 ASSAY OF URINE CHLORIDE: CPT

## 2017-12-19 PROCEDURE — 82962 GLUCOSE BLOOD TEST: CPT

## 2017-12-19 PROCEDURE — 86850 RBC ANTIBODY SCREEN: CPT

## 2017-12-19 PROCEDURE — 82553 CREATINE MB FRACTION: CPT

## 2017-12-19 PROCEDURE — 94150 VITAL CAPACITY TEST: CPT

## 2017-12-19 PROCEDURE — 94002 VENT MGMT INPAT INIT DAY: CPT

## 2017-12-19 PROCEDURE — 77001 FLUOROGUIDE FOR VEIN DEVICE: CPT

## 2017-12-19 PROCEDURE — 85025 COMPLETE CBC W/AUTO DIFF WBC: CPT

## 2017-12-19 PROCEDURE — 89055 LEUKOCYTE ASSESSMENT FECAL: CPT

## 2017-12-19 PROCEDURE — 84132 ASSAY OF SERUM POTASSIUM: CPT

## 2017-12-19 PROCEDURE — 85730 THROMBOPLASTIN TIME PARTIAL: CPT

## 2017-12-19 PROCEDURE — 80053 COMPREHEN METABOLIC PANEL: CPT

## 2017-12-19 PROCEDURE — P9047: CPT

## 2017-12-19 PROCEDURE — 80076 HEPATIC FUNCTION PANEL: CPT

## 2017-12-19 PROCEDURE — 76770 US EXAM ABDO BACK WALL COMP: CPT

## 2017-12-19 PROCEDURE — 84443 ASSAY THYROID STIM HORMONE: CPT

## 2017-12-19 PROCEDURE — 80048 BASIC METABOLIC PNL TOTAL CA: CPT

## 2017-12-19 PROCEDURE — 86803 HEPATITIS C AB TEST: CPT

## 2017-12-19 PROCEDURE — 85027 COMPLETE CBC AUTOMATED: CPT

## 2017-12-19 PROCEDURE — 36430 TRANSFUSION BLD/BLD COMPNT: CPT

## 2017-12-19 PROCEDURE — 86901 BLOOD TYPING SEROLOGIC RH(D): CPT

## 2017-12-19 PROCEDURE — 87045 FECES CULTURE AEROBIC BACT: CPT

## 2017-12-19 PROCEDURE — 36558 INSERT TUNNELED CV CATH: CPT

## 2017-12-19 PROCEDURE — 93005 ELECTROCARDIOGRAM TRACING: CPT

## 2017-12-19 PROCEDURE — 84133 ASSAY OF URINE POTASSIUM: CPT

## 2017-12-19 PROCEDURE — 80061 LIPID PANEL: CPT

## 2017-12-19 PROCEDURE — 83930 ASSAY OF BLOOD OSMOLALITY: CPT

## 2017-12-19 PROCEDURE — P9045: CPT

## 2017-12-19 PROCEDURE — 84100 ASSAY OF PHOSPHORUS: CPT

## 2017-12-19 PROCEDURE — 84436 ASSAY OF TOTAL THYROXINE: CPT

## 2017-12-19 PROCEDURE — 76937 US GUIDE VASCULAR ACCESS: CPT

## 2017-12-19 PROCEDURE — 90935 HEMODIALYSIS ONE EVALUATION: CPT

## 2017-12-19 PROCEDURE — 83735 ASSAY OF MAGNESIUM: CPT

## 2017-12-19 PROCEDURE — 85610 PROTHROMBIN TIME: CPT

## 2017-12-19 PROCEDURE — 36415 COLL VENOUS BLD VENIPUNCTURE: CPT

## 2017-12-19 PROCEDURE — 82803 BLOOD GASES ANY COMBINATION: CPT

## 2017-12-19 PROCEDURE — 84540 ASSAY OF URINE/UREA-N: CPT

## 2017-12-19 PROCEDURE — 85018 HEMOGLOBIN: CPT

## 2017-12-19 PROCEDURE — 94640 AIRWAY INHALATION TREATMENT: CPT

## 2017-12-19 PROCEDURE — 86706 HEP B SURFACE ANTIBODY: CPT

## 2017-12-19 PROCEDURE — 83935 ASSAY OF URINE OSMOLALITY: CPT

## 2017-12-19 PROCEDURE — C1750: CPT

## 2017-12-19 PROCEDURE — 83036 HEMOGLOBIN GLYCOSYLATED A1C: CPT

## 2017-12-19 PROCEDURE — C1769: CPT

## 2017-12-19 PROCEDURE — 86923 COMPATIBILITY TEST ELECTRIC: CPT

## 2017-12-19 PROCEDURE — 84300 ASSAY OF URINE SODIUM: CPT

## 2017-12-19 PROCEDURE — P9016: CPT

## 2017-12-19 PROCEDURE — 84481 FREE ASSAY (FT-3): CPT

## 2018-06-18 NOTE — BRIEF OPERATIVE NOTE - PRE-OP DX
Outpatient Behavioral Health Progress Note    Date: 6/18/18  Time Session Began:0915 am  Ended:10:00 am    Session Type: 45 Minute Therapy (88940)    Others Present: NA    Intervention: Behavioral, Cognitive, Insight and Supportive    Patient Level of Functioning: No Change    Suicide/Homicide/Violence Ideation: No    Current Global Assessment of Functioning (1-100):64     Change in Medication(s) Reported: No    Patient/Family Education Provided: Yes   Patient/Family Displays Understanding: Yes   Need for Community Resources Assessed: Yes  Resources Needed: No      Primary Diagnosis: Alcohol Use Disorder Code: F10.2    Treatment Plan: Unchanged    Discharge Plan: Weekly support groups recommended, Verbalized a Relapse Prevention Plan, Identified support network    Next Appointment: 7/16/18    Chief Complaint: AODA (Alcohol and Other Drug Abuse)    Progress Note:   Pt contacted provider prior to her appointment this morning to say she was coming but might be a little bit late. Pt arrived only a few minutes late for her outpatient individual session with provider for substance abuse treatment to focus on relapse prevention. Patient was alert and oriented x3, presented with a balanced, pleasant mood and affect was appropriate to content. Pt provided a brief update since she was last seen on June 4th. Pt stated she was 3 months sober on the 13th, still hasn't had any cravings to drink, but did have some using dreams that were very odd. Pt eagerly shared that the best news is that the lump in her breast was benign! However, the results from her abnormal pap lead to additional tests which turned out to show she has a polup that must be surgically removed. Pt reports she's been experiencing a lot of bad cramping. Pt stated as long as she stays on top of her ibuprofen and tylenol regime the pain is manageable. Pt has been trying to get a hold of the doctor to schedule the procedure as quickly as possible. Pt disclosed that  she hasn't been to a meeting nor talked with her sponsor in a while because of all her doctor's apts. Firmly encouraged pt to commitment to at least min.of 1 meeting per week and to stay connected to her sponsor. Pt plans to get back on track with outside recovery program. Pt will continue to follow up in outpatient individual AODA.   3-vessel CAD  10/04/2017    Active  Humberto Loving

## 2018-07-03 NOTE — CONSULT NOTE ADULT - NEUROLOGICAL DETAILS
Please have her increase chlorthalidone to 25 mg daily with a BMP in one week. She will need a research BP check in 30 days. Goal BP < 130/80 per CREST II trial.   alert and oriented x 3/responds to verbal commands

## 2019-03-05 NOTE — PROGRESS NOTE ADULT - SUBJECTIVE AND OBJECTIVE BOX
Patient was seen and evaluated on dialysis.   Patient is tolerating the procedure well.   HR: 66 (10-10-17 @ 13:00)  BP: 116/75 (10-10-17 @ 13:00)  Continue dialysis:   Dialyzer:  180 Optiflux        QB: 300       QD: 500  3K+  Goal UF 1 Kg over 3 Hours abdomen

## 2019-05-07 PROBLEM — I10 ESSENTIAL (PRIMARY) HYPERTENSION: Chronic | Status: ACTIVE | Noted: 2017-09-30

## 2019-05-07 PROBLEM — C61 MALIGNANT NEOPLASM OF PROSTATE: Chronic | Status: ACTIVE | Noted: 2017-09-30

## 2019-05-07 PROBLEM — E11.9 TYPE 2 DIABETES MELLITUS WITHOUT COMPLICATIONS: Chronic | Status: ACTIVE | Noted: 2017-09-30

## 2019-08-20 ENCOUNTER — FORM ENCOUNTER (OUTPATIENT)
Age: 69
End: 2019-08-20

## 2019-08-21 ENCOUNTER — OUTPATIENT (OUTPATIENT)
Dept: OUTPATIENT SERVICES | Facility: HOSPITAL | Age: 69
LOS: 1 days | End: 2019-08-21
Payer: MEDICARE

## 2019-08-21 ENCOUNTER — OTHER (OUTPATIENT)
Age: 69
End: 2019-08-21

## 2019-08-21 ENCOUNTER — APPOINTMENT (OUTPATIENT)
Dept: CT IMAGING | Facility: IMAGING CENTER | Age: 69
End: 2019-08-21
Payer: MEDICARE

## 2019-08-21 ENCOUNTER — APPOINTMENT (OUTPATIENT)
Dept: UROLOGY | Facility: CLINIC | Age: 69
End: 2019-08-21
Payer: MEDICARE

## 2019-08-21 DIAGNOSIS — N40.1 BENIGN PROSTATIC HYPERPLASIA WITH LOWER URINARY TRACT SYMPMS: ICD-10-CM

## 2019-08-21 DIAGNOSIS — N13.8 BENIGN PROSTATIC HYPERPLASIA WITH LOWER URINARY TRACT SYMPMS: ICD-10-CM

## 2019-08-21 DIAGNOSIS — R31.0 GROSS HEMATURIA: ICD-10-CM

## 2019-08-21 PROCEDURE — 99214 OFFICE O/P EST MOD 30 MIN: CPT | Mod: 25

## 2019-08-21 PROCEDURE — 51798 US URINE CAPACITY MEASURE: CPT

## 2019-08-21 PROCEDURE — 74176 CT ABD & PELVIS W/O CONTRAST: CPT

## 2019-08-21 PROCEDURE — 74176 CT ABD & PELVIS W/O CONTRAST: CPT | Mod: 26

## 2019-08-21 RX ORDER — SERTRALINE 25 MG/1
25 TABLET, FILM COATED ORAL DAILY
Refills: 0 | Status: DISCONTINUED | COMMUNITY
End: 2019-08-21

## 2019-08-21 RX ORDER — CARVEDILOL 3.12 MG/1
3.12 TABLET, FILM COATED ORAL
Qty: 180 | Refills: 0 | Status: ACTIVE | COMMUNITY
Start: 2018-11-29

## 2019-08-21 RX ORDER — TAMSULOSIN HYDROCHLORIDE 0.4 MG/1
0.4 CAPSULE ORAL
Refills: 0 | Status: DISCONTINUED | COMMUNITY
End: 2019-08-21

## 2019-08-21 RX ORDER — ALLOPURINOL 300 MG/1
300 TABLET ORAL DAILY
Refills: 0 | Status: DISCONTINUED | COMMUNITY
Start: 2017-11-20 | End: 2019-08-21

## 2019-08-21 RX ORDER — PANTOPRAZOLE SODIUM 40 MG/1
40 TABLET, DELAYED RELEASE ORAL DAILY
Qty: 1 | Refills: 1 | Status: DISCONTINUED | COMMUNITY
End: 2019-08-21

## 2019-08-21 RX ORDER — SODIUM BICARBONATE 650 MG/1
650 TABLET ORAL
Qty: 100 | Refills: 0 | Status: ACTIVE | COMMUNITY
Start: 2019-01-10

## 2019-08-21 RX ORDER — LOSARTAN POTASSIUM 25 MG/1
25 TABLET, FILM COATED ORAL
Qty: 90 | Refills: 0 | Status: ACTIVE | COMMUNITY
Start: 2019-05-02

## 2019-08-21 RX ORDER — CLOPIDOGREL BISULFATE 75 MG/1
75 TABLET, FILM COATED ORAL
Qty: 30 | Refills: 0 | Status: COMPLETED | COMMUNITY
End: 2019-08-21

## 2019-08-21 RX ORDER — ASPIRIN ENTERIC COATED TABLETS 81 MG 81 MG/1
81 TABLET, DELAYED RELEASE ORAL DAILY
Qty: 60 | Refills: 0 | Status: ACTIVE | COMMUNITY

## 2019-08-21 NOTE — ASSESSMENT
[FreeTextEntry1] : \par Impression/Plan:69 yo gentleman - pt of Dr Olsen , last seen in 2017 , failed to follow up last year, presents with gross hematuria, unaccompanied with any irritative urinary ss, no fevers, no N/V. He has hx of PCA , sp RT in ?  2011 with Dr Negrete . He does not recall his last PSA , but our records show PSA 0.29  in 2017 , will repeat today.He denies any obstructive urinary ss and is happy with his current happy with his voiding pattern. Voids q 3 h day time and 2-3  times at night. Fluids : 8-10  glasses of water , 2 cups of coffee, 1 cup of tea.\par 1.UA microscopic analysis, culture and cytology will be send today.call for results.\par 2.PSA drawn today - call for results \par 3.CT Urogram \par 4. RTO for cystoscopy with Dr Olsen. He has an upcoming appt with Dr Olsen on  9/17/19 - will email  to get an earlier appt. for cystoscopy. \par PVR 0 ml.

## 2019-08-21 NOTE — PHYSICAL EXAM
[General Appearance - Well Developed] : well developed [General Appearance - Well Nourished] : well nourished [Normal Appearance] : normal appearance [Well Groomed] : well groomed [General Appearance - In No Acute Distress] : no acute distress [Abdomen Soft] : soft [Abdomen Tenderness] : non-tender [Costovertebral Angle Tenderness] : no ~M costovertebral angle tenderness [FreeTextEntry1] : deferred -  [Edema] : no peripheral edema [] : no respiratory distress [Respiration, Rhythm And Depth] : normal respiratory rhythm and effort [Exaggerated Use Of Accessory Muscles For Inspiration] : no accessory muscle use [Affect] : the affect was normal [Oriented To Time, Place, And Person] : oriented to person, place, and time [Not Anxious] : not anxious [Mood] : the mood was normal [Normal Station and Gait] : the gait and station were normal for the patient's age [No Focal Deficits] : no focal deficits [No Palpable Adenopathy] : no palpable adenopathy

## 2019-08-23 LAB
PSA FREE FLD-MCNC: 12 %
PSA FREE SERPL-MCNC: 0.03 NG/ML
PSA SERPL-MCNC: 0.25 NG/ML

## 2019-09-17 ENCOUNTER — APPOINTMENT (OUTPATIENT)
Dept: UROLOGY | Facility: CLINIC | Age: 69
End: 2019-09-17
Payer: MEDICARE

## 2019-09-17 VITALS
OXYGEN SATURATION: 100 % | TEMPERATURE: 98.6 F | HEART RATE: 69 BPM | DIASTOLIC BLOOD PRESSURE: 84 MMHG | RESPIRATION RATE: 18 BRPM | SYSTOLIC BLOOD PRESSURE: 151 MMHG

## 2019-09-17 DIAGNOSIS — N20.0 CALCULUS OF KIDNEY: ICD-10-CM

## 2019-09-17 PROCEDURE — 52000 CYSTOURETHROSCOPY: CPT

## 2019-09-17 PROCEDURE — 99214 OFFICE O/P EST MOD 30 MIN: CPT | Mod: 25

## 2019-09-17 NOTE — PHYSICAL EXAM
[General Appearance - Well Developed] : well developed [General Appearance - Well Nourished] : well nourished [Normal Appearance] : normal appearance [General Appearance - In No Acute Distress] : no acute distress [Well Groomed] : well groomed [Abdomen Soft] : soft [Abdomen Tenderness] : non-tender [Costovertebral Angle Tenderness] : no ~M costovertebral angle tenderness [Urethral Meatus] : meatus normal [Urinary Bladder Findings] : the bladder was normal on palpation [Scrotum] : the scrotum was normal [No Prostate Nodules] : no prostate nodules [Testes Mass (___cm)] : there were no testicular masses [Edema] : no peripheral edema [] : no respiratory distress [Respiration, Rhythm And Depth] : normal respiratory rhythm and effort [Exaggerated Use Of Accessory Muscles For Inspiration] : no accessory muscle use [Oriented To Time, Place, And Person] : oriented to person, place, and time [Affect] : the affect was normal [Mood] : the mood was normal [Not Anxious] : not anxious [Normal Station and Gait] : the gait and station were normal for the patient's age [No Focal Deficits] : no focal deficits [No Palpable Adenopathy] : no palpable adenopathy

## 2019-09-18 LAB
APPEARANCE: CLEAR
BACTERIA: NEGATIVE
BILIRUBIN URINE: NEGATIVE
BLOOD URINE: NEGATIVE
COLOR: YELLOW
GLUCOSE QUALITATIVE U: NEGATIVE
HYALINE CASTS: 1 /LPF
KETONES URINE: NEGATIVE
LEUKOCYTE ESTERASE URINE: ABNORMAL
MICROSCOPIC-UA: NORMAL
NITRITE URINE: NEGATIVE
PH URINE: 6.5
PROTEIN URINE: NORMAL
RED BLOOD CELLS URINE: 3 /HPF
SPECIFIC GRAVITY URINE: 1.02
SQUAMOUS EPITHELIAL CELLS: 2 /HPF
URINE CYTOLOGY: NORMAL
UROBILINOGEN URINE: ABNORMAL
WHITE BLOOD CELLS URINE: 14 /HPF

## 2019-12-19 NOTE — HISTORY OF PRESENT ILLNESS
Note:



Basic Screen: 

Yes

High Risk DC Screen 

Yes

 Name: 

RNAD Roach Tel: 

[phone]

Relationship: 

BROTHER

Pre-Admission Living Arrangements: 

SNF

Healthcare Decision Maker: 

Patient

Advance Directive 

No

Discipline: 

Case Mgt/Social Svcs

Tentative Discharge Plan/Destination: 

SNF/ECF

Will require assistance post discharge: 

No

 Referred to : 

No

Tentative Discharge Plan Summary: 

Patient is a 53-year-old male admitted for sepsis. Patient has PMHX of 

ESRD on HD, DM, alcoholic cirrhosis, and hypothyroidism. Patient was 

admitted from [facility]. SW contacted Chadalistair from admissions 

at [facility] [phone]. Per Jaxon, patient is a assisted 

patient, currently on a bed hold, has no advanced directive, and is 

self-responsible for medical decisions. Patient's tentative discharge plan 

is to return to [facility]. No further needs identified.

 Signature: 

DARIEN Hsu

Date: 

Dec 19, 2019

Time: 

10:53 [FreeTextEntry1] : 67 yo gentleman - pt of Dr Olsen , last seen in 2017 , failed to follow up last year, presents with gross hematuria, unaccompanied with any irritative urinary ss, no fevers, no N/V. He has hx of PCA , sp RT in ?  2011 with Dr Negrete . He does not recall his last PSA , but our records show PSA 0.29  in 2017 , will repeat today.He denies any obstructive urinary ss and is happy with his current happy with his voiding pattern. Voids q 3 h day time and 2-3  times at night.Fluids : 8-10  glasses of water , 2 cups of coffee, 1 cup of tea.\par  UA microscopic analysis, culture and cytology will be send today.CT Urogram and RTO for cystoscopy with Dr Olsen. He has an upcoming appt with Dr Olsen on  9/17/19 - will email  to get an earlier appt. for cystoscopy. \par PVR 0 ml.

## 2020-09-15 ENCOUNTER — APPOINTMENT (OUTPATIENT)
Dept: UROLOGY | Facility: CLINIC | Age: 70
End: 2020-09-15

## 2020-10-28 NOTE — PATIENT PROFILE ADULT. - SOURCE OF INFORMATION, PROFILE
Medication:   Requested Prescriptions     Pending Prescriptions Disp Refills    fluticasone-salmeterol (ADVAIR) 500-50 MCG/DOSE diskus inhaler [Pharmacy Med Name: FLUTICASONE-SALMETEROL 500-50]  0     Sig: Inhale 1 puff into the lungs every 12 hours        Last Filled:      Patient Phone Number: [phone] (home) [phone] (work)    Last appt: 4/30/2020   Next appt: Visit date not found    Last OARRS: No flowsheet data found.
patient

## 2021-01-07 ENCOUNTER — APPOINTMENT (OUTPATIENT)
Dept: UROLOGY | Facility: CLINIC | Age: 71
End: 2021-01-07
Payer: MEDICARE

## 2021-01-07 PROCEDURE — 99214 OFFICE O/P EST MOD 30 MIN: CPT

## 2021-01-08 LAB
APPEARANCE: CLEAR
BACTERIA: NEGATIVE
BILIRUBIN URINE: ABNORMAL
BLOOD URINE: NEGATIVE
COLOR: ABNORMAL
GLUCOSE QUALITATIVE U: NEGATIVE
GRANULAR CASTS: 0 /LPF
HYALINE CASTS: 0 /LPF
KETONES URINE: NORMAL
LEUKOCYTE ESTERASE URINE: NEGATIVE
MICROSCOPIC-UA: NORMAL
NITRITE URINE: NEGATIVE
PH URINE: 5
PROTEIN URINE: ABNORMAL
PSA FREE FLD-MCNC: 18 %
PSA FREE SERPL-MCNC: 0.04 NG/ML
PSA SERPL-MCNC: 0.23 NG/ML
RED BLOOD CELLS URINE: 4 /HPF
SPECIFIC GRAVITY URINE: >=1.03
SQUAMOUS EPITHELIAL CELLS: 5 /HPF
UROBILINOGEN URINE: NORMAL
WHITE BLOOD CELLS URINE: 4 /HPF

## 2021-01-09 LAB — URINE CYTOLOGY: NORMAL

## 2021-01-18 ENCOUNTER — TRANSCRIPTION ENCOUNTER (OUTPATIENT)
Age: 71
End: 2021-01-18

## 2021-05-27 ENCOUNTER — APPOINTMENT (OUTPATIENT)
Dept: ORTHOPEDIC SURGERY | Facility: CLINIC | Age: 71
End: 2021-05-27
Payer: MEDICARE

## 2021-05-27 VITALS
WEIGHT: 173 LBS | DIASTOLIC BLOOD PRESSURE: 66 MMHG | SYSTOLIC BLOOD PRESSURE: 115 MMHG | HEART RATE: 85 BPM | BODY MASS INDEX: 23.43 KG/M2 | HEIGHT: 71.97 IN

## 2021-05-27 PROCEDURE — 72100 X-RAY EXAM L-S SPINE 2/3 VWS: CPT

## 2021-06-03 ENCOUNTER — APPOINTMENT (OUTPATIENT)
Dept: SPINE | Facility: CLINIC | Age: 71
End: 2021-06-03

## 2021-08-20 ENCOUNTER — APPOINTMENT (OUTPATIENT)
Dept: ORTHOPEDIC SURGERY | Facility: CLINIC | Age: 71
End: 2021-08-20
Payer: MEDICARE

## 2021-08-20 VITALS
HEART RATE: 79 BPM | HEIGHT: 73 IN | SYSTOLIC BLOOD PRESSURE: 107 MMHG | WEIGHT: 170 LBS | BODY MASS INDEX: 22.53 KG/M2 | DIASTOLIC BLOOD PRESSURE: 65 MMHG

## 2021-08-20 DIAGNOSIS — M54.16 RADICULOPATHY, LUMBAR REGION: ICD-10-CM

## 2021-08-20 PROCEDURE — 99214 OFFICE O/P EST MOD 30 MIN: CPT

## 2022-02-03 ENCOUNTER — APPOINTMENT (OUTPATIENT)
Dept: UROLOGY | Facility: CLINIC | Age: 72
End: 2022-02-03
Payer: MEDICARE

## 2022-02-03 DIAGNOSIS — C61 MALIGNANT NEOPLASM OF PROSTATE: ICD-10-CM

## 2022-02-03 DIAGNOSIS — R35.0 FREQUENCY OF MICTURITION: ICD-10-CM

## 2022-02-03 PROCEDURE — 99213 OFFICE O/P EST LOW 20 MIN: CPT

## 2022-02-04 LAB
APPEARANCE: CLEAR
BACTERIA: NEGATIVE
BILIRUBIN URINE: ABNORMAL
BLOOD URINE: NEGATIVE
COLOR: YELLOW
GLUCOSE QUALITATIVE U: NEGATIVE
HYALINE CASTS: 6 /LPF
KETONES URINE: NEGATIVE
LEUKOCYTE ESTERASE URINE: ABNORMAL
MICROSCOPIC-UA: NORMAL
NITRITE URINE: NEGATIVE
PH URINE: 6
PROTEIN URINE: ABNORMAL
PSA FREE FLD-MCNC: 14 %
PSA FREE SERPL-MCNC: 0.03 NG/ML
PSA SERPL-MCNC: 0.22 NG/ML
RED BLOOD CELLS URINE: 3 /HPF
SPECIFIC GRAVITY URINE: 1.04
SQUAMOUS EPITHELIAL CELLS: 2 /HPF
URINE COMMENTS: NORMAL
URINE CYTOLOGY: NORMAL
UROBILINOGEN URINE: ABNORMAL
WHITE BLOOD CELLS URINE: 8 /HPF

## 2022-03-21 ENCOUNTER — APPOINTMENT (OUTPATIENT)
Dept: ORTHOPEDIC SURGERY | Facility: CLINIC | Age: 72
End: 2022-03-21
Payer: COMMERCIAL

## 2022-03-21 VITALS
WEIGHT: 175 LBS | DIASTOLIC BLOOD PRESSURE: 64 MMHG | HEART RATE: 80 BPM | HEIGHT: 72 IN | BODY MASS INDEX: 23.7 KG/M2 | SYSTOLIC BLOOD PRESSURE: 100 MMHG

## 2022-03-21 DIAGNOSIS — M47.812 SPONDYLOSIS W/OUT MYELOPATHY OR RADICULOPATHY, CERVICAL REGION: ICD-10-CM

## 2022-03-21 DIAGNOSIS — M51.36 OTHER INTERVERTEBRAL DISC DEGENERATION, LUMBAR REGION: ICD-10-CM

## 2022-03-21 PROCEDURE — 99072 ADDL SUPL MATRL&STAF TM PHE: CPT

## 2022-03-21 PROCEDURE — 99214 OFFICE O/P EST MOD 30 MIN: CPT

## 2023-01-27 ENCOUNTER — APPOINTMENT (OUTPATIENT)
Dept: NEUROLOGY | Facility: CLINIC | Age: 73
End: 2023-01-27

## 2023-02-02 ENCOUNTER — APPOINTMENT (OUTPATIENT)
Dept: UROLOGY | Facility: CLINIC | Age: 73
End: 2023-02-02

## 2023-05-27 NOTE — CONSULT NOTE ADULT - RESPIRATORY AND THORAX
To get better and follow your care plan as instructed.
To get better and follow your care plan as instructed.
details…

## 2023-10-01 PROBLEM — Z92.3 HISTORY OF RADIATION THERAPY: Status: RESOLVED | Noted: 2017-10-19 | Resolved: 2023-10-01

## 2024-04-17 NOTE — PROGRESS NOTE ADULT - SUBJECTIVE AND OBJECTIVE BOX
Patient was seen and evaluated on dialysis.   Patient is tolerating the procedure well.   HR: 72 (10-02-17 @ 00:00)  BP: 112/75 (10-02-17 @ 00:00)  Continue dialysis:   Dialyzer: optiflux 160 QB: 200 QD: 500 2k bath    Goal UF 1.5kg over 2.5 Hours Pt with intermittent headaches X2 weeks.  Per pt she mostly has the headaches at night and "it happens and I feel my body get hot".  No denies any pain at this time.  No fevers.  Denies PMHx, SHx, NKDA. IUTD. Pt is awake and alert with easy wob.
